# Patient Record
Sex: MALE | Race: BLACK OR AFRICAN AMERICAN | NOT HISPANIC OR LATINO | URBAN - METROPOLITAN AREA
[De-identification: names, ages, dates, MRNs, and addresses within clinical notes are randomized per-mention and may not be internally consistent; named-entity substitution may affect disease eponyms.]

---

## 2017-05-14 ENCOUNTER — EMERGENCY (EMERGENCY)
Facility: HOSPITAL | Age: 62
LOS: 1 days | Discharge: HOME | End: 2017-05-14
Attending: EMERGENCY MEDICINE | Admitting: EMERGENCY MEDICINE
Payer: MEDICARE

## 2017-05-14 VITALS
DIASTOLIC BLOOD PRESSURE: 76 MMHG | HEART RATE: 93 BPM | TEMPERATURE: 98 F | RESPIRATION RATE: 20 BRPM | OXYGEN SATURATION: 98 % | SYSTOLIC BLOOD PRESSURE: 147 MMHG | WEIGHT: 160.06 LBS

## 2017-05-14 VITALS
HEART RATE: 77 BPM | RESPIRATION RATE: 18 BRPM | SYSTOLIC BLOOD PRESSURE: 129 MMHG | DIASTOLIC BLOOD PRESSURE: 83 MMHG | TEMPERATURE: 98 F | OXYGEN SATURATION: 95 %

## 2017-05-14 DIAGNOSIS — F17.210 NICOTINE DEPENDENCE, CIGARETTES, UNCOMPLICATED: ICD-10-CM

## 2017-05-14 DIAGNOSIS — R53.1 WEAKNESS: ICD-10-CM

## 2017-05-14 DIAGNOSIS — J18.9 PNEUMONIA, UNSPECIFIED ORGANISM: ICD-10-CM

## 2017-05-14 DIAGNOSIS — Z87.898 PERSONAL HISTORY OF OTHER SPECIFIED CONDITIONS: Chronic | ICD-10-CM

## 2017-05-14 DIAGNOSIS — Z79.1 LONG TERM (CURRENT) USE OF NON-STEROIDAL ANTI-INFLAMMATORIES (NSAID): ICD-10-CM

## 2017-05-14 DIAGNOSIS — Z79.2 LONG TERM (CURRENT) USE OF ANTIBIOTICS: ICD-10-CM

## 2017-05-14 DIAGNOSIS — Z79.899 OTHER LONG TERM (CURRENT) DRUG THERAPY: ICD-10-CM

## 2017-05-14 LAB
ALBUMIN SERPL ELPH-MCNC: 3.1 G/DL — LOW (ref 3.4–5)
ALP SERPL-CCNC: 88 U/L — SIGNIFICANT CHANGE UP (ref 40–120)
ALT FLD-CCNC: 22 U/L — SIGNIFICANT CHANGE UP (ref 12–42)
ANION GAP SERPL CALC-SCNC: 5 MMOL/L — LOW (ref 9–16)
AST SERPL-CCNC: 16 U/L — SIGNIFICANT CHANGE UP (ref 15–37)
BASOPHILS NFR BLD AUTO: 0.2 % — SIGNIFICANT CHANGE UP (ref 0–2)
BILIRUB SERPL-MCNC: 0.5 MG/DL — SIGNIFICANT CHANGE UP (ref 0.2–1.2)
BUN SERPL-MCNC: 9 MG/DL — SIGNIFICANT CHANGE UP (ref 7–23)
CALCIUM SERPL-MCNC: 9.2 MG/DL — SIGNIFICANT CHANGE UP (ref 8.5–10.5)
CHLORIDE SERPL-SCNC: 104 MMOL/L — SIGNIFICANT CHANGE UP (ref 96–108)
CO2 SERPL-SCNC: 30 MMOL/L — SIGNIFICANT CHANGE UP (ref 22–31)
CREAT SERPL-MCNC: 0.84 MG/DL — SIGNIFICANT CHANGE UP (ref 0.5–1.3)
EOSINOPHIL NFR BLD AUTO: 1.3 % — SIGNIFICANT CHANGE UP (ref 0–6)
GLUCOSE SERPL-MCNC: 115 MG/DL — HIGH (ref 70–99)
HCT VFR BLD CALC: 44.3 % — SIGNIFICANT CHANGE UP (ref 39–50)
HGB BLD-MCNC: 14.9 G/DL — SIGNIFICANT CHANGE UP (ref 13–17)
LYMPHOCYTES # BLD AUTO: 29.6 % — SIGNIFICANT CHANGE UP (ref 13–44)
MCHC RBC-ENTMCNC: 29.7 PG — SIGNIFICANT CHANGE UP (ref 27–34)
MCHC RBC-ENTMCNC: 33.6 G/DL — SIGNIFICANT CHANGE UP (ref 32–36)
MCV RBC AUTO: 88.4 FL — SIGNIFICANT CHANGE UP (ref 80–100)
MONOCYTES NFR BLD AUTO: 7 % — SIGNIFICANT CHANGE UP (ref 2–14)
NEUTROPHILS NFR BLD AUTO: 61.9 % — SIGNIFICANT CHANGE UP (ref 43–77)
PLATELET # BLD AUTO: 185 K/UL — SIGNIFICANT CHANGE UP (ref 150–400)
POTASSIUM SERPL-MCNC: 4.6 MMOL/L — SIGNIFICANT CHANGE UP (ref 3.5–5.3)
POTASSIUM SERPL-SCNC: 4.6 MMOL/L — SIGNIFICANT CHANGE UP (ref 3.5–5.3)
PROT SERPL-MCNC: 7.7 G/DL — SIGNIFICANT CHANGE UP (ref 6.4–8.2)
RBC # BLD: 5.01 M/UL — SIGNIFICANT CHANGE UP (ref 4.2–5.8)
RBC # FLD: 13.6 % — SIGNIFICANT CHANGE UP (ref 10.3–16.9)
SODIUM SERPL-SCNC: 139 MMOL/L — SIGNIFICANT CHANGE UP (ref 135–145)
WBC # BLD: 8.3 K/UL — SIGNIFICANT CHANGE UP (ref 3.8–10.5)
WBC # FLD AUTO: 8.3 K/UL — SIGNIFICANT CHANGE UP (ref 3.8–10.5)

## 2017-05-14 PROCEDURE — 85025 COMPLETE CBC W/AUTO DIFF WBC: CPT

## 2017-05-14 PROCEDURE — 96374 THER/PROPH/DIAG INJ IV PUSH: CPT

## 2017-05-14 PROCEDURE — 99284 EMERGENCY DEPT VISIT MOD MDM: CPT | Mod: 25

## 2017-05-14 PROCEDURE — 93010 ELECTROCARDIOGRAM REPORT: CPT

## 2017-05-14 PROCEDURE — 36415 COLL VENOUS BLD VENIPUNCTURE: CPT

## 2017-05-14 PROCEDURE — 71020: CPT | Mod: 26

## 2017-05-14 PROCEDURE — 71046 X-RAY EXAM CHEST 2 VIEWS: CPT

## 2017-05-14 PROCEDURE — 80053 COMPREHEN METABOLIC PANEL: CPT

## 2017-05-14 PROCEDURE — 93005 ELECTROCARDIOGRAM TRACING: CPT

## 2017-05-14 RX ORDER — SODIUM CHLORIDE 9 MG/ML
1000 INJECTION INTRAMUSCULAR; INTRAVENOUS; SUBCUTANEOUS ONCE
Qty: 0 | Refills: 0 | Status: COMPLETED | OUTPATIENT
Start: 2017-05-14 | End: 2017-05-14

## 2017-05-14 RX ADMIN — SODIUM CHLORIDE 2000 MILLILITER(S): 9 INJECTION INTRAMUSCULAR; INTRAVENOUS; SUBCUTANEOUS at 08:19

## 2017-05-14 NOTE — ED PROVIDER NOTE - ATTENDING CONTRIBUTION TO CARE
62 yo healthy M with cough x 1 week.  No cp, sob.  VSS.  Pt well on exam, nonlabored, faint rare crackle, no LE edema.  SY noted by PA ? interstitial infiltrates.  Possible CAP.  Plan abx and outpt fu.

## 2017-05-14 NOTE — ED PROVIDER NOTE - NS ED ROS FT
CONSTITUTIONAL: chills, fatigue per HPI  NEURO: No headache, no dizziness, no syncope; No weakness/tingling/numbness  EYES: No visual changes  ENT: No  sore throat, mild rhinorrhea  PULM: No dyspnea or hemoptysis  CV: No chest pain or palpitations  GI: No abdominal pain, vomiting, or diarrhea  : No dysuria, hematuria, frequency  MSK: No neck pain or back pain, no joint pain; no LE swelling or calf pain.  SKIN: no rash

## 2017-05-14 NOTE — ED PROVIDER NOTE - PHYSICAL EXAMINATION
CONSTITUTIONAL: WD,WN. NAD.    SKIN: Normal color and turgor. No rash.    EYES: Conjunctiva clear. EOMI. PERRL.    ENT: Airway patent, OP clear. Nasal mucosa clear, no rhinorrhea.   RESPIRATORY:  Breathing non-labored. No retractions, accessory muscle use.  Lungs with coarse breath sounds.  CARDIOVASCULAR:  RRR, S1S2. No M/R/G.      GI:   Bowel sounds present. Abdomen soft, nontender.    MSK: No joint swelling.  No neck or back tenderness. Painless FROM.  No lower extremity edema or calf tenderness.    NEURO: Alert and oriented; PRAKASH without gross abnormalities.  Normal speech and gait. CONSTITUTIONAL: WD,WN. NAD.    SKIN: Normal color and turgor. No rash.    EYES: Conjunctiva clear. EOMI. PERRL.    ENT: Airway patent, OP clear. Nasal mucosa clear, no rhinorrhea.   RESPIRATORY:  Breathing non-labored. No retractions, accessory muscle use.  Lungs with few scattered crackles.  CARDIOVASCULAR:  RRR, S1S2. No M/R/G.      GI:   Bowel sounds present. Abdomen soft, nontender.    MSK: No joint swelling.  No neck or back tenderness. Painless FROM.  No lower extremity edema or calf tenderness.    NEURO: Alert and oriented; PRAKASH without gross abnormalities.  Normal speech and gait.

## 2017-05-14 NOTE — ED PROVIDER NOTE - OBJECTIVE STATEMENT
pt with hx of substance abuse on MMTP c/o cough x 1 week.  producing large amount yellowish sputum. does not feel short of breath, but feels slightly fatigued. feels cold & some chills.  no syncope.  no chest pain. vomited once 2 days ago.  no hematemasis or bile.  no abd pain, diarrhea, BRBPR or melena.

## 2017-05-14 NOTE — ED PROVIDER NOTE - RADIOLOGY FINDINGS
suspect interstitial infiltrates, air bronchograms left side.  no ptx.  no effusion.  normal heart size.

## 2017-05-14 NOTE — ED PROVIDER NOTE - MEDICAL DECISION MAKING DETAILS
61m with cough and fatigue.  Cough fairly severe, but otherwise nontoxic appearing.  Check labs and CXR. 61m with cough.  Cough fairly severe with yellow sputum, but otherwise nontoxic appearing.  AVSS, good oxygen saturation.  No signs of CHF.  Check labs and CXR.

## 2017-06-14 ENCOUNTER — EMERGENCY (EMERGENCY)
Facility: HOSPITAL | Age: 62
LOS: 1 days | Discharge: LEFT BEFORE DISCHARGE | End: 2017-06-14
Attending: EMERGENCY MEDICINE | Admitting: EMERGENCY MEDICINE

## 2017-06-14 VITALS
SYSTOLIC BLOOD PRESSURE: 95 MMHG | WEIGHT: 160.06 LBS | HEART RATE: 81 BPM | DIASTOLIC BLOOD PRESSURE: 70 MMHG | TEMPERATURE: 97 F

## 2017-06-14 DIAGNOSIS — M79.672 PAIN IN LEFT FOOT: ICD-10-CM

## 2017-06-14 DIAGNOSIS — Z87.898 PERSONAL HISTORY OF OTHER SPECIFIED CONDITIONS: Chronic | ICD-10-CM

## 2017-06-14 DIAGNOSIS — M79.671 PAIN IN RIGHT FOOT: ICD-10-CM

## 2017-06-14 NOTE — ED ADULT TRIAGE NOTE - CHIEF COMPLAINT QUOTE
bilateral foot pain x 4 years.  "I have bad bunions and calluses."  Denies recent falls / injury.  No deformity / wounds noted.  Pulses palpable.

## 2017-06-14 NOTE — ED ADULT NURSE NOTE - OBJECTIVE STATEMENT
61 yr old male patient with c/o of bilateral foot pain [id number].  A+Ox3, ambulatory w steady gait, denies recent trauma.

## 2017-06-15 NOTE — ED PROVIDER NOTE - OBJECTIVE STATEMENT
I was not able to locate pt in the treatment area and had not seen him personally. pt was not seen by midlevel provider , chaim ferrara

## 2018-01-25 ENCOUNTER — EMERGENCY (EMERGENCY)
Facility: HOSPITAL | Age: 63
LOS: 1 days | Discharge: HOME | End: 2018-01-25
Attending: EMERGENCY MEDICINE | Admitting: EMERGENCY MEDICINE
Payer: MEDICARE

## 2018-01-25 VITALS
SYSTOLIC BLOOD PRESSURE: 145 MMHG | HEART RATE: 55 BPM | WEIGHT: 163.58 LBS | TEMPERATURE: 97 F | DIASTOLIC BLOOD PRESSURE: 78 MMHG | OXYGEN SATURATION: 96 % | RESPIRATION RATE: 18 BRPM | HEIGHT: 71 IN

## 2018-01-25 DIAGNOSIS — Z79.1 LONG TERM (CURRENT) USE OF NON-STEROIDAL ANTI-INFLAMMATORIES (NSAID): ICD-10-CM

## 2018-01-25 DIAGNOSIS — Z79.891 LONG TERM (CURRENT) USE OF OPIATE ANALGESIC: ICD-10-CM

## 2018-01-25 DIAGNOSIS — B34.9 VIRAL INFECTION, UNSPECIFIED: ICD-10-CM

## 2018-01-25 DIAGNOSIS — Z72.0 TOBACCO USE: ICD-10-CM

## 2018-01-25 DIAGNOSIS — Z87.898 PERSONAL HISTORY OF OTHER SPECIFIED CONDITIONS: Chronic | ICD-10-CM

## 2018-01-25 DIAGNOSIS — Z79.899 OTHER LONG TERM (CURRENT) DRUG THERAPY: ICD-10-CM

## 2018-01-25 LAB — RAPID RVP RESULT: SIGNIFICANT CHANGE UP

## 2018-01-25 PROCEDURE — 71046 X-RAY EXAM CHEST 2 VIEWS: CPT

## 2018-01-25 PROCEDURE — 87581 M.PNEUMON DNA AMP PROBE: CPT

## 2018-01-25 PROCEDURE — 87486 CHLMYD PNEUM DNA AMP PROBE: CPT

## 2018-01-25 PROCEDURE — 99284 EMERGENCY DEPT VISIT MOD MDM: CPT

## 2018-01-25 PROCEDURE — 87798 DETECT AGENT NOS DNA AMP: CPT

## 2018-01-25 PROCEDURE — 71046 X-RAY EXAM CHEST 2 VIEWS: CPT | Mod: 26

## 2018-01-25 PROCEDURE — 99283 EMERGENCY DEPT VISIT LOW MDM: CPT | Mod: 25

## 2018-01-25 PROCEDURE — 87633 RESP VIRUS 12-25 TARGETS: CPT

## 2018-01-25 RX ORDER — CETIRIZINE HYDROCHLORIDE 10 MG/1
1 TABLET ORAL
Qty: 7 | Refills: 0 | OUTPATIENT
Start: 2018-01-25 | End: 2018-01-31

## 2018-01-25 NOTE — ED PROVIDER NOTE - MEDICAL DECISION MAKING DETAILS
Patient with viral symptoms, afebrile with not significant PE. CXr appear neg for infiltrate. Recommend supportive care and f/u with PMD.

## 2018-01-25 NOTE — ED ADULT NURSE NOTE - OBJECTIVE STATEMENT
pt presents to ED c/o cough. Pt states. " I have not been feeling well for the past couple of days and now since I missed my methadone program I feel worse" Upon assessment, pt has dishevel appearance and overall poor hygiene. Pt AAO x 3 speech is clear and coherent breathing even and unlabored.

## 2018-01-25 NOTE — ED PROVIDER NOTE - OBJECTIVE STATEMENT
63 y/o m with c/o one week dry cough, nasal congestion,sore throat, fatigue. Denies fever, sob, chest pain, n, v, abd pain, ear pain. No flu vaccine. He denies h/o HTN, DM ,asthma, + smoker.

## 2018-05-14 ENCOUNTER — EMERGENCY (EMERGENCY)
Facility: HOSPITAL | Age: 63
LOS: 1 days | Discharge: HOME | End: 2018-05-14
Attending: EMERGENCY MEDICINE | Admitting: EMERGENCY MEDICINE
Payer: MEDICARE

## 2018-05-14 VITALS
HEART RATE: 93 BPM | OXYGEN SATURATION: 100 % | RESPIRATION RATE: 18 BRPM | TEMPERATURE: 98 F | DIASTOLIC BLOOD PRESSURE: 71 MMHG | SYSTOLIC BLOOD PRESSURE: 122 MMHG

## 2018-05-14 VITALS
RESPIRATION RATE: 16 BRPM | DIASTOLIC BLOOD PRESSURE: 72 MMHG | TEMPERATURE: 98 F | HEART RATE: 64 BPM | OXYGEN SATURATION: 99 % | SYSTOLIC BLOOD PRESSURE: 134 MMHG

## 2018-05-14 DIAGNOSIS — Z79.891 LONG TERM (CURRENT) USE OF OPIATE ANALGESIC: ICD-10-CM

## 2018-05-14 DIAGNOSIS — Z79.899 OTHER LONG TERM (CURRENT) DRUG THERAPY: ICD-10-CM

## 2018-05-14 DIAGNOSIS — Z87.898 PERSONAL HISTORY OF OTHER SPECIFIED CONDITIONS: Chronic | ICD-10-CM

## 2018-05-14 DIAGNOSIS — Z79.1 LONG TERM (CURRENT) USE OF NON-STEROIDAL ANTI-INFLAMMATORIES (NSAID): ICD-10-CM

## 2018-05-14 DIAGNOSIS — F11.23 OPIOID DEPENDENCE WITH WITHDRAWAL: ICD-10-CM

## 2018-05-14 DIAGNOSIS — R53.1 WEAKNESS: ICD-10-CM

## 2018-05-14 DIAGNOSIS — Z79.2 LONG TERM (CURRENT) USE OF ANTIBIOTICS: ICD-10-CM

## 2018-05-14 PROCEDURE — 99284 EMERGENCY DEPT VISIT MOD MDM: CPT | Mod: 25

## 2018-05-14 PROCEDURE — 96375 TX/PRO/DX INJ NEW DRUG ADDON: CPT

## 2018-05-14 PROCEDURE — 82962 GLUCOSE BLOOD TEST: CPT

## 2018-05-14 PROCEDURE — 96374 THER/PROPH/DIAG INJ IV PUSH: CPT

## 2018-05-14 PROCEDURE — 93010 ELECTROCARDIOGRAM REPORT: CPT

## 2018-05-14 PROCEDURE — 93005 ELECTROCARDIOGRAM TRACING: CPT

## 2018-05-14 RX ORDER — SODIUM CHLORIDE 9 MG/ML
1000 INJECTION INTRAMUSCULAR; INTRAVENOUS; SUBCUTANEOUS ONCE
Qty: 0 | Refills: 0 | Status: COMPLETED | OUTPATIENT
Start: 2018-05-14 | End: 2018-05-14

## 2018-05-14 RX ORDER — ONDANSETRON 8 MG/1
4 TABLET, FILM COATED ORAL ONCE
Qty: 0 | Refills: 0 | Status: COMPLETED | OUTPATIENT
Start: 2018-05-14 | End: 2018-05-14

## 2018-05-14 RX ORDER — METOCLOPRAMIDE HCL 10 MG
10 TABLET ORAL ONCE
Qty: 0 | Refills: 0 | Status: COMPLETED | OUTPATIENT
Start: 2018-05-14 | End: 2018-05-14

## 2018-05-14 RX ADMIN — ONDANSETRON 4 MILLIGRAM(S): 8 TABLET, FILM COATED ORAL at 11:16

## 2018-05-14 RX ADMIN — Medication 10 MILLIGRAM(S): at 12:37

## 2018-05-14 RX ADMIN — SODIUM CHLORIDE 2000 MILLILITER(S): 9 INJECTION INTRAMUSCULAR; INTRAVENOUS; SUBCUTANEOUS at 11:16

## 2018-05-14 RX ADMIN — Medication 0.2 MILLIGRAM(S): at 10:21

## 2018-05-14 RX ADMIN — SODIUM CHLORIDE 2000 MILLILITER(S): 9 INJECTION INTRAMUSCULAR; INTRAVENOUS; SUBCUTANEOUS at 13:37

## 2018-05-14 RX ADMIN — ONDANSETRON 4 MILLIGRAM(S): 8 TABLET, FILM COATED ORAL at 10:21

## 2018-05-14 NOTE — ED ADULT NURSE NOTE - CHPI ED SYMPTOMS NEG
no chills/no disorientation/no pain/no fever/no abdominal distension/no vomiting/no abdominal pain/no confusion

## 2018-05-14 NOTE — ED PROVIDER NOTE - OBJECTIVE STATEMENT
63yo M hx of polysubstance abuse, here w/ complaint of nausea and feeling tremulous in setting of recently going off methadone and using heroin for a week until 2 days ago. +vomiting. No abdominal pain. Feels shaky. Denies etoh abuse or other drugs, states just heroin.

## 2018-05-14 NOTE — ED PROVIDER NOTE - MEDICAL DECISION MAKING DETAILS
here w/ acute opiate withdrawal and n/v and dehdyration. felt much better after fluids and anti emetics. DC home in NAD with strict return precautions given. to follow up with his methadone program and stop doing heroin

## 2018-05-14 NOTE — ED ADULT NURSE NOTE - OBJECTIVE STATEMENT
63 y/o male w/ hx of IV drug use currently in methadone program c/o weakness, diarrhea and constipation since yesterday, headache after missing methadone program x1 week and taking heroine 3 days ago. Patient A+Ox3, ambulating with steady gait.

## 2018-09-19 ENCOUNTER — EMERGENCY (EMERGENCY)
Facility: HOSPITAL | Age: 63
LOS: 1 days | Discharge: HOME | End: 2018-09-19
Admitting: EMERGENCY MEDICINE
Payer: MEDICARE

## 2018-09-19 VITALS
DIASTOLIC BLOOD PRESSURE: 77 MMHG | TEMPERATURE: 98 F | HEIGHT: 71 IN | SYSTOLIC BLOOD PRESSURE: 111 MMHG | WEIGHT: 158.07 LBS | OXYGEN SATURATION: 97 % | HEART RATE: 62 BPM | RESPIRATION RATE: 18 BRPM

## 2018-09-19 DIAGNOSIS — M79.671 PAIN IN RIGHT FOOT: ICD-10-CM

## 2018-09-19 DIAGNOSIS — Z87.898 PERSONAL HISTORY OF OTHER SPECIFIED CONDITIONS: Chronic | ICD-10-CM

## 2018-09-19 DIAGNOSIS — L84 CORNS AND CALLOSITIES: ICD-10-CM

## 2018-09-19 DIAGNOSIS — Z79.899 OTHER LONG TERM (CURRENT) DRUG THERAPY: ICD-10-CM

## 2018-09-19 PROCEDURE — 99282 EMERGENCY DEPT VISIT SF MDM: CPT

## 2018-09-19 NOTE — ED PROVIDER NOTE - OBJECTIVE STATEMENT
63 y/o male with right foot pain x1 week. Pt reports he has dead skin on the bottom of his foot and it keeps flaking. Pt reports pain is located under his toe and he feels a rough dead skin that feels like he is walking on a rock. He denies the following: fever, chills, injury, numbness/tingling, swelling, redness. Pt denies seeing podiatry.

## 2018-09-19 NOTE — ED ADULT NURSE NOTE - CHPI ED NUR SYMPTOMS NEG
no tingling/no decreased eating/drinking/no nausea/no weakness/no chills/no dizziness/no vomiting/no fever

## 2018-09-19 NOTE — ED ADULT NURSE NOTE - OBJECTIVE STATEMENT
62 year old male patient with c/o of R foot pain x1week, c/o of "the skin is peeling on the bottom of my foot, it's all flaky".  No distress noted.  Denies n/v/d/f, denies trauma to area.

## 2018-09-19 NOTE — ED PROVIDER NOTE - MEDICAL DECISION MAKING DETAILS
61 y/o male with right toe pain due to callus located on bottom of foot. Pt asked for blade to cut it off. Pt was advised risk of infection may occur and can use a scrubbing pad to slowly remove the callus or fu with podiatry.

## 2018-09-19 NOTE — ED ADULT NURSE NOTE - NSIMPLEMENTINTERV_GEN_ALL_ED
Implemented All Universal Safety Interventions:  [place] to call system. Call bell, personal items and telephone within reach. Instruct patient to call for assistance. Room bathroom lighting operational. Non-slip footwear when patient is off stretcher. Physically safe environment: no spills, clutter or unnecessary equipment. Stretcher in lowest position, wheels locked, appropriate side rails in place.

## 2018-11-27 NOTE — ED ADULT NURSE NOTE - CAS TRG GENERAL NORM CIRC DET
5D: Impaired Motor Function and Sensory Integrity Associated with Nonprogressive Disorders of the Central Nervous System—Acquired in Adolescence or Adulthood
Strong peripheral pulses

## 2018-12-11 ENCOUNTER — EMERGENCY (EMERGENCY)
Facility: HOSPITAL | Age: 63
LOS: 1 days | Discharge: HOME | End: 2018-12-11
Attending: EMERGENCY MEDICINE | Admitting: EMERGENCY MEDICINE
Payer: MEDICARE

## 2018-12-11 VITALS
HEART RATE: 84 BPM | OXYGEN SATURATION: 98 % | SYSTOLIC BLOOD PRESSURE: 138 MMHG | TEMPERATURE: 98 F | RESPIRATION RATE: 18 BRPM | WEIGHT: 172.18 LBS | DIASTOLIC BLOOD PRESSURE: 81 MMHG

## 2018-12-11 VITALS
RESPIRATION RATE: 16 BRPM | HEART RATE: 72 BPM | TEMPERATURE: 98 F | SYSTOLIC BLOOD PRESSURE: 132 MMHG | DIASTOLIC BLOOD PRESSURE: 69 MMHG | OXYGEN SATURATION: 99 %

## 2018-12-11 DIAGNOSIS — Z87.898 PERSONAL HISTORY OF OTHER SPECIFIED CONDITIONS: Chronic | ICD-10-CM

## 2018-12-11 LAB
ALBUMIN SERPL ELPH-MCNC: 3.8 G/DL — SIGNIFICANT CHANGE UP (ref 3.3–5)
ALP SERPL-CCNC: 91 U/L — SIGNIFICANT CHANGE UP (ref 40–120)
ALT FLD-CCNC: 17 U/L — SIGNIFICANT CHANGE UP (ref 10–45)
ANION GAP SERPL CALC-SCNC: 8 MMOL/L — SIGNIFICANT CHANGE UP (ref 5–17)
APPEARANCE UR: CLEAR — SIGNIFICANT CHANGE UP
APTT BLD: 31.6 SEC — SIGNIFICANT CHANGE UP (ref 27.5–36.3)
AST SERPL-CCNC: 21 U/L — SIGNIFICANT CHANGE UP (ref 10–40)
BASOPHILS NFR BLD AUTO: 0.2 % — SIGNIFICANT CHANGE UP (ref 0–2)
BILIRUB SERPL-MCNC: 0.5 MG/DL — SIGNIFICANT CHANGE UP (ref 0.2–1.2)
BILIRUB UR-MCNC: NEGATIVE — SIGNIFICANT CHANGE UP
BUN SERPL-MCNC: 16 MG/DL — SIGNIFICANT CHANGE UP (ref 7–23)
CALCIUM SERPL-MCNC: 9.8 MG/DL — SIGNIFICANT CHANGE UP (ref 8.4–10.5)
CHLORIDE SERPL-SCNC: 106 MMOL/L — SIGNIFICANT CHANGE UP (ref 96–108)
CO2 SERPL-SCNC: 27 MMOL/L — SIGNIFICANT CHANGE UP (ref 22–31)
COLOR SPEC: YELLOW — SIGNIFICANT CHANGE UP
CREAT SERPL-MCNC: 0.97 MG/DL — SIGNIFICANT CHANGE UP (ref 0.5–1.3)
DIFF PNL FLD: NEGATIVE — SIGNIFICANT CHANGE UP
EOSINOPHIL NFR BLD AUTO: 1.1 % — SIGNIFICANT CHANGE UP (ref 0–6)
GLUCOSE SERPL-MCNC: 116 MG/DL — HIGH (ref 70–99)
GLUCOSE UR QL: NEGATIVE — SIGNIFICANT CHANGE UP
HCT VFR BLD CALC: 46.3 % — SIGNIFICANT CHANGE UP (ref 39–50)
HGB BLD-MCNC: 15.2 G/DL — SIGNIFICANT CHANGE UP (ref 13–17)
INR BLD: 1.06 — SIGNIFICANT CHANGE UP (ref 0.88–1.16)
KETONES UR-MCNC: NEGATIVE — SIGNIFICANT CHANGE UP
LACTATE SERPL-SCNC: 1.3 MMOL/L — SIGNIFICANT CHANGE UP (ref 0.5–2)
LEUKOCYTE ESTERASE UR-ACNC: NEGATIVE — SIGNIFICANT CHANGE UP
LIDOCAIN IGE QN: 26 U/L — SIGNIFICANT CHANGE UP (ref 7–60)
LYMPHOCYTES # BLD AUTO: 29.9 % — SIGNIFICANT CHANGE UP (ref 13–44)
MAGNESIUM SERPL-MCNC: 2 MG/DL — SIGNIFICANT CHANGE UP (ref 1.6–2.6)
MCHC RBC-ENTMCNC: 30.2 PG — SIGNIFICANT CHANGE UP (ref 27–34)
MCHC RBC-ENTMCNC: 32.8 G/DL — SIGNIFICANT CHANGE UP (ref 32–36)
MCV RBC AUTO: 92 FL — SIGNIFICANT CHANGE UP (ref 80–100)
MONOCYTES NFR BLD AUTO: 9.5 % — SIGNIFICANT CHANGE UP (ref 2–14)
NEUTROPHILS NFR BLD AUTO: 59.3 % — SIGNIFICANT CHANGE UP (ref 43–77)
NITRITE UR-MCNC: NEGATIVE — SIGNIFICANT CHANGE UP
PH UR: 6.5 — SIGNIFICANT CHANGE UP (ref 5–8)
PLATELET # BLD AUTO: 156 K/UL — SIGNIFICANT CHANGE UP (ref 150–400)
POTASSIUM SERPL-MCNC: 4.4 MMOL/L — SIGNIFICANT CHANGE UP (ref 3.5–5.3)
POTASSIUM SERPL-SCNC: 4.4 MMOL/L — SIGNIFICANT CHANGE UP (ref 3.5–5.3)
PROT SERPL-MCNC: 6.7 G/DL — SIGNIFICANT CHANGE UP (ref 6–8.3)
PROT UR-MCNC: NEGATIVE MG/DL — SIGNIFICANT CHANGE UP
PROTHROM AB SERPL-ACNC: 12 SEC — SIGNIFICANT CHANGE UP (ref 10–12.9)
RBC # BLD: 5.03 M/UL — SIGNIFICANT CHANGE UP (ref 4.2–5.8)
RBC # FLD: 14.5 % — SIGNIFICANT CHANGE UP (ref 10.3–16.9)
SODIUM SERPL-SCNC: 141 MMOL/L — SIGNIFICANT CHANGE UP (ref 135–145)
SP GR SPEC: 1.02 — SIGNIFICANT CHANGE UP (ref 1–1.03)
UROBILINOGEN FLD QL: 2 E.U./DL
WBC # BLD: 6.2 K/UL — SIGNIFICANT CHANGE UP (ref 3.8–10.5)
WBC # FLD AUTO: 6.2 K/UL — SIGNIFICANT CHANGE UP (ref 3.8–10.5)

## 2018-12-11 PROCEDURE — 85610 PROTHROMBIN TIME: CPT

## 2018-12-11 PROCEDURE — 83605 ASSAY OF LACTIC ACID: CPT

## 2018-12-11 PROCEDURE — 80053 COMPREHEN METABOLIC PANEL: CPT

## 2018-12-11 PROCEDURE — 83735 ASSAY OF MAGNESIUM: CPT

## 2018-12-11 PROCEDURE — 85025 COMPLETE CBC W/AUTO DIFF WBC: CPT

## 2018-12-11 PROCEDURE — 99285 EMERGENCY DEPT VISIT HI MDM: CPT | Mod: 25

## 2018-12-11 PROCEDURE — 36415 COLL VENOUS BLD VENIPUNCTURE: CPT

## 2018-12-11 PROCEDURE — 83690 ASSAY OF LIPASE: CPT

## 2018-12-11 PROCEDURE — 74177 CT ABD & PELVIS W/CONTRAST: CPT | Mod: 26

## 2018-12-11 PROCEDURE — 85730 THROMBOPLASTIN TIME PARTIAL: CPT

## 2018-12-11 PROCEDURE — 96375 TX/PRO/DX INJ NEW DRUG ADDON: CPT

## 2018-12-11 PROCEDURE — 74177 CT ABD & PELVIS W/CONTRAST: CPT

## 2018-12-11 PROCEDURE — 96374 THER/PROPH/DIAG INJ IV PUSH: CPT | Mod: XU

## 2018-12-11 PROCEDURE — 81003 URINALYSIS AUTO W/O SCOPE: CPT

## 2018-12-11 PROCEDURE — 96361 HYDRATE IV INFUSION ADD-ON: CPT

## 2018-12-11 PROCEDURE — 99284 EMERGENCY DEPT VISIT MOD MDM: CPT | Mod: 25

## 2018-12-11 RX ORDER — SODIUM CHLORIDE 9 MG/ML
1000 INJECTION INTRAMUSCULAR; INTRAVENOUS; SUBCUTANEOUS ONCE
Qty: 0 | Refills: 0 | Status: COMPLETED | OUTPATIENT
Start: 2018-12-11 | End: 2018-12-11

## 2018-12-11 RX ORDER — MORPHINE SULFATE 50 MG/1
2 CAPSULE, EXTENDED RELEASE ORAL ONCE
Qty: 0 | Refills: 0 | Status: DISCONTINUED | OUTPATIENT
Start: 2018-12-11 | End: 2018-12-11

## 2018-12-11 RX ORDER — IOHEXOL 300 MG/ML
30 INJECTION, SOLUTION INTRAVENOUS ONCE
Qty: 0 | Refills: 0 | Status: COMPLETED | OUTPATIENT
Start: 2018-12-11 | End: 2018-12-11

## 2018-12-11 RX ORDER — ONDANSETRON 8 MG/1
4 TABLET, FILM COATED ORAL ONCE
Qty: 0 | Refills: 0 | Status: COMPLETED | OUTPATIENT
Start: 2018-12-11 | End: 2018-12-11

## 2018-12-11 RX ORDER — KETOROLAC TROMETHAMINE 30 MG/ML
15 SYRINGE (ML) INJECTION ONCE
Qty: 0 | Refills: 0 | Status: DISCONTINUED | OUTPATIENT
Start: 2018-12-11 | End: 2018-12-11

## 2018-12-11 RX ADMIN — SODIUM CHLORIDE 1000 MILLILITER(S): 9 INJECTION INTRAMUSCULAR; INTRAVENOUS; SUBCUTANEOUS at 02:26

## 2018-12-11 RX ADMIN — Medication 15 MILLIGRAM(S): at 02:31

## 2018-12-11 RX ADMIN — MORPHINE SULFATE 2 MILLIGRAM(S): 50 CAPSULE, EXTENDED RELEASE ORAL at 02:31

## 2018-12-11 RX ADMIN — SODIUM CHLORIDE 1000 MILLILITER(S): 9 INJECTION INTRAMUSCULAR; INTRAVENOUS; SUBCUTANEOUS at 03:21

## 2018-12-11 RX ADMIN — ONDANSETRON 4 MILLIGRAM(S): 8 TABLET, FILM COATED ORAL at 02:31

## 2018-12-11 RX ADMIN — MORPHINE SULFATE 2 MILLIGRAM(S): 50 CAPSULE, EXTENDED RELEASE ORAL at 03:21

## 2018-12-11 RX ADMIN — IOHEXOL 30 MILLILITER(S): 300 INJECTION, SOLUTION INTRAVENOUS at 02:35

## 2018-12-11 RX ADMIN — Medication 15 MILLIGRAM(S): at 03:21

## 2018-12-11 NOTE — ED ADULT NURSE REASSESSMENT NOTE - NS ED NURSE REASSESS COMMENT FT1
Patient brought back from CT scan, patient reports that he is hungry. Instructed patient to wait for CT results before oral consumption. Patient verbalized understanding.

## 2018-12-11 NOTE — ED PROVIDER NOTE - PHYSICAL EXAMINATION
VITAL SIGNS: I have reviewed nursing notes and confirm.  CONSTITUTIONAL: Well-developed, disheveled man lying in stretcher speaking clearly in complete sentences, following commands appropriately; in no acute distress.  SKIN: Agree with RN documentation regarding decubitus evaluation. Remainder of skin exam is warm and dry, no acute rash.  HEAD: Normocephalic; atraumatic.  EYES: PERRL, EOM intact; conjunctiva and sclera clear.  ENT: No nasal discharge; airway clear.  NECK: Supple; non tender.  CARD: S1, S2 normal; no murmurs, gallops, or rubs. RRR  RESP: No wheezes, rales or rhonchi. CTA w/good excursion, no inc wob  ABD: Normal bowel sounds; soft; ND, + large L inguinal hernia TTP and hard, non-reducible  : No testicular TTP, on penile discharge or lesions  EXT: Normal ROM. No clubbing, cyanosis or edema.  LYMPH: No acute cervical adenopathy.  NEURO: Alert, oriented. Grossly unremarkable.  PSYCH: Cooperative, appropriate.

## 2018-12-11 NOTE — ED ADULT NURSE REASSESSMENT NOTE - NS ED NURSE REASSESS COMMENT FT1
Patient reports that he is hungry. Patient evaluated by NELLIE Mazariegos at bedside. Patient cleared by PA for oral consumption. Patient given a sandwich.

## 2018-12-11 NOTE — ED ADULT NURSE NOTE - NSIMPLEMENTINTERV_GEN_ALL_ED
Implemented All Universal Safety Interventions:  [place] to call system. Call bell, personal items and telephone within reach. Instruct patient to call for assistance. Room bathroom lighting operational. Non-slip footwear when patient is off stretcher. Physically safe environment: no spills, clutter or unnecessary equipment. Stretcher in lowest position, wheels locked, appropriate side rails in place.

## 2018-12-11 NOTE — ED PROVIDER NOTE - PROGRESS NOTE DETAILS
Slightly confusing wording in CT report, I called radiology and they confirmed there is no bowel involvement in the hernia.  Pt examined at bedside says he feels better now, "the hernia went down."  Eating a sandwich.  Abdomen soft, no palpable left inguinal mass at this time.

## 2018-12-11 NOTE — ED PROVIDER NOTE - MEDICAL DECISION MAKING DETAILS
+ tender L inguinal hernia, possible incarceration/obstruction, pending labs/imaging studies. HDS and comfortable in stretcher.

## 2018-12-11 NOTE — ED PROVIDER NOTE - OBJECTIVE STATEMENT
64 y/o M, poor historian, maral PMhx, stating that his only history is a brain tumor removal when he was a child, doesn't follow with physicians, states that for an unknown amount of time has had a L sided inguinal hernia, but that it normally comes and goes. For the past 3 days it has been present, hard, and painful. He also reports constipation, stating that he hasn't had a BM in 2 days. "might be" passing gas. Denies n/v, reporting a normal appetite. No fever/chills. Denies urinary sx.

## 2018-12-11 NOTE — ED ADULT NURSE NOTE - OBJECTIVE STATEMENT
Patient reports abdominal pain onset last night. No guarding or tenderness noted. Patient denies fevers, chills, dysuria. Patient reports abdominal pain onset last night. No guarding or  general tenderness noted. Patient reports tenderness to the left lower quadrant upon palpation. Patient denies fevers, chills, dysuria.

## 2018-12-11 NOTE — ED PROVIDER NOTE - CARE PROVIDER_API CALL
Shanelle Stoner), Surgery  [address], [place] [address]  Phone: [phone]  Fax: [phone]

## 2018-12-15 DIAGNOSIS — Z79.2 LONG TERM (CURRENT) USE OF ANTIBIOTICS: ICD-10-CM

## 2018-12-15 DIAGNOSIS — R10.9 UNSPECIFIED ABDOMINAL PAIN: ICD-10-CM

## 2018-12-15 DIAGNOSIS — Z79.1 LONG TERM (CURRENT) USE OF NON-STEROIDAL ANTI-INFLAMMATORIES (NSAID): ICD-10-CM

## 2018-12-15 DIAGNOSIS — K40.90 UNILATERAL INGUINAL HERNIA, WITHOUT OBSTRUCTION OR GANGRENE, NOT SPECIFIED AS RECURRENT: ICD-10-CM

## 2018-12-15 DIAGNOSIS — Z79.899 OTHER LONG TERM (CURRENT) DRUG THERAPY: ICD-10-CM

## 2019-01-27 ENCOUNTER — EMERGENCY (EMERGENCY)
Facility: HOSPITAL | Age: 64
LOS: 1 days | Discharge: HOME | End: 2019-01-27
Attending: EMERGENCY MEDICINE | Admitting: EMERGENCY MEDICINE
Payer: COMMERCIAL

## 2019-01-27 VITALS
WEIGHT: 173.94 LBS | TEMPERATURE: 98 F | OXYGEN SATURATION: 96 % | DIASTOLIC BLOOD PRESSURE: 53 MMHG | SYSTOLIC BLOOD PRESSURE: 126 MMHG | RESPIRATION RATE: 16 BRPM | HEART RATE: 105 BPM

## 2019-01-27 VITALS
OXYGEN SATURATION: 96 % | TEMPERATURE: 98 F | HEART RATE: 86 BPM | RESPIRATION RATE: 16 BRPM | DIASTOLIC BLOOD PRESSURE: 82 MMHG | SYSTOLIC BLOOD PRESSURE: 134 MMHG

## 2019-01-27 DIAGNOSIS — Z87.898 PERSONAL HISTORY OF OTHER SPECIFIED CONDITIONS: Chronic | ICD-10-CM

## 2019-01-27 LAB
ANION GAP SERPL CALC-SCNC: 12 MMOL/L — SIGNIFICANT CHANGE UP (ref 5–17)
BASOPHILS NFR BLD AUTO: 0.5 % — SIGNIFICANT CHANGE UP (ref 0–2)
BUN SERPL-MCNC: 15 MG/DL — SIGNIFICANT CHANGE UP (ref 7–23)
CALCIUM SERPL-MCNC: 9 MG/DL — SIGNIFICANT CHANGE UP (ref 8.4–10.5)
CHLORIDE SERPL-SCNC: 104 MMOL/L — SIGNIFICANT CHANGE UP (ref 96–108)
CO2 SERPL-SCNC: 23 MMOL/L — SIGNIFICANT CHANGE UP (ref 22–31)
CREAT SERPL-MCNC: 0.84 MG/DL — SIGNIFICANT CHANGE UP (ref 0.5–1.3)
EOSINOPHIL NFR BLD AUTO: 1.6 % — SIGNIFICANT CHANGE UP (ref 0–6)
GLUCOSE SERPL-MCNC: 107 MG/DL — HIGH (ref 70–99)
HCT VFR BLD CALC: 46.4 % — SIGNIFICANT CHANGE UP (ref 39–50)
HGB BLD-MCNC: 15.4 G/DL — SIGNIFICANT CHANGE UP (ref 13–17)
LYMPHOCYTES # BLD AUTO: 34.1 % — SIGNIFICANT CHANGE UP (ref 13–44)
MCHC RBC-ENTMCNC: 29.8 PG — SIGNIFICANT CHANGE UP (ref 27–34)
MCHC RBC-ENTMCNC: 33.2 G/DL — SIGNIFICANT CHANGE UP (ref 32–36)
MCV RBC AUTO: 89.9 FL — SIGNIFICANT CHANGE UP (ref 80–100)
MONOCYTES NFR BLD AUTO: 7.5 % — SIGNIFICANT CHANGE UP (ref 2–14)
NEUTROPHILS NFR BLD AUTO: 56.3 % — SIGNIFICANT CHANGE UP (ref 43–77)
PLATELET # BLD AUTO: 216 K/UL — SIGNIFICANT CHANGE UP (ref 150–400)
POTASSIUM SERPL-MCNC: 4.3 MMOL/L — SIGNIFICANT CHANGE UP (ref 3.5–5.3)
POTASSIUM SERPL-SCNC: 4.3 MMOL/L — SIGNIFICANT CHANGE UP (ref 3.5–5.3)
RBC # BLD: 5.16 M/UL — SIGNIFICANT CHANGE UP (ref 4.2–5.8)
RBC # FLD: 13.1 % — SIGNIFICANT CHANGE UP (ref 10.3–16.9)
SODIUM SERPL-SCNC: 139 MMOL/L — SIGNIFICANT CHANGE UP (ref 135–145)
WBC # BLD: 6.4 K/UL — SIGNIFICANT CHANGE UP (ref 3.8–10.5)
WBC # FLD AUTO: 6.4 K/UL — SIGNIFICANT CHANGE UP (ref 3.8–10.5)

## 2019-01-27 PROCEDURE — 85025 COMPLETE CBC W/AUTO DIFF WBC: CPT

## 2019-01-27 PROCEDURE — 80048 BASIC METABOLIC PNL TOTAL CA: CPT

## 2019-01-27 PROCEDURE — 99284 EMERGENCY DEPT VISIT MOD MDM: CPT

## 2019-01-27 PROCEDURE — 99283 EMERGENCY DEPT VISIT LOW MDM: CPT

## 2019-01-27 PROCEDURE — 36415 COLL VENOUS BLD VENIPUNCTURE: CPT

## 2019-01-27 NOTE — ED PROVIDER NOTE - MEDICAL DECISION MAKING DETAILS
Hernia reduced w/out evidence of incarceration or obstruction. Normal BM today, passing gas. Reassuring exam. Hernia reduced w/out evidence of incarceration or obstruction. Normal BM today, passing gas. Reassuring exam. Labs wnl. Tolerating PO. will d/c home with surgical f/u. Given return precautions.

## 2019-01-27 NOTE — ED PROVIDER NOTE - NSFOLLOWUPINSTRUCTIONS_ED_ALL_ED_FT
INGUINAL HERNIA - AfterCare(R) Instructions(ER/ED)     Inguinal Hernia    WHAT YOU NEED TO KNOW:    An inguinal hernia happens when organs or abdominal tissue push through a weak spot in the abdominal wall. The abdominal wall is made of fat and muscle. It holds the intestines in place. The hernia may contain fluid, tissue from the abdomen, or part of an organ (such as an intestine).Inguinal Hernia         DISCHARGE INSTRUCTIONS:    Return to the emergency department if:     You have severe abdominal pain with nausea and vomiting.       Your abdomen is larger than usual.       Your hernia gets bigger or is purple or blue.       You see blood in your bowel movements.      You feel weak, dizzy, or faint.     Contact your healthcare provider if:     You have a fever.      You have questions or concerns about your condition or care.    Medicine: You may need the following:     NSAIDs, such as ibuprofen, help decrease swelling, pain, and fever. NSAIDs can cause stomach bleeding or kidney problems in certain people. If you take blood thinner medicine, always ask your healthcare provider if NSAIDs are safe for you. Always read the medicine label and follow directions.      Take your medicine as directed. Contact your healthcare provider if you think your medicine is not helping or if you have side effects. Tell him or her if you are allergic to any medicine. Keep a list of the medicines, vitamins, and herbs you take. Include the amounts, and when and why you take them. Bring the list or the pill bottles to follow-up visits. Carry your medicine list with you in case of an emergency.    Follow up with your healthcare provider as directed: Write down your questions so you remember to ask them during your visits.    Manage your symptoms and prevent another hernia:     Do not lift anything heavy. Heavy lifting can make your hernia worse or cause another hernia. Ask your healthcare provider how much is safe for you to lift.       Drink liquids as directed. Liquids may prevent constipation and straining during a bowel movement. Ask how much liquid to drink each day and which liquids are best for you.       Eat foods high in fiber. Fiber may prevent constipation and straining during a bowel movement. Foods that contain fiber include fruits, vegetables, beans, lentils, and whole grains.       Maintain a healthy weight. If you are overweight, weight loss may prevent your hernia from getting worse. It may also prevent another hernia. Talk to your healthcare provider about exercise and how to lose weight safely if you are overweight.       Do not smoke. Nicotine and other chemicals in cigarettes and cigars can weaken the abdominal wall. This may increase your risk for another hernia. Ask your healthcare provider for information if you currently smoke and need help to quit. E-cigarettes or smokeless tobacco still contain nicotine. Talk to your healthcare provider before you use these products.

## 2019-01-27 NOTE — ED PROVIDER NOTE - PHYSICAL EXAMINATION
VITAL SIGNS: I have reviewed nursing notes and confirm.  CONSTITUTIONAL: Well-developed; well-nourished; in no acute distress.  SKIN: Agree with RN documentation regarding decubitus evaluation. Remainder of skin exam is warm and dry, no acute rash.  HEAD: Normocephalic; atraumatic.  EYES: PERRL, EOM intact; conjunctiva and sclera clear.  ENT: No nasal discharge; airway clear, + poor dentition/dental caries  NECK: Supple; non tender.  CARD: S1, S2 normal; no murmurs, gallops, or rubs. RRR  RESP: No wheezes, rales or rhonchi. CTA w/good excursion, no inc wob  ABD: Normal bowel sounds; soft; NTND, no evidence of hernia  : circumcised, no lesions, testicles normal lie, non-ttp, no erythema or scrotal edema  EXT: Normal ROM. No clubbing, cyanosis or edema.  LYMPH: No acute cervical adenopathy.  NEURO: Alert, oriented. Grossly unremarkable.  PSYCH: Cooperative, appropriate.

## 2019-01-27 NOTE — ED PROVIDER NOTE - OBJECTIVE STATEMENT
64 y/o M w/no known medical hx (poor historian), undomiciled living in shelter system, p/w L inguinal groin pain, stating that he has had a L sided inguinal hernia for several years that is present on standing and reduces when he lies down that wouldn't reduce today after straining very hard on the toilet to have a BM. At time of interview, pt's hernia was reduced, and he states that after arriving in the ED awaiting evaluation he was able to reduce it. No urinary sx, testicular pain, n/v/d. Denies pain of any kind at time of interview.

## 2019-01-31 DIAGNOSIS — R10.32 LEFT LOWER QUADRANT PAIN: ICD-10-CM

## 2019-01-31 DIAGNOSIS — K02.9 DENTAL CARIES, UNSPECIFIED: ICD-10-CM

## 2019-01-31 DIAGNOSIS — K40.90 UNILATERAL INGUINAL HERNIA, WITHOUT OBSTRUCTION OR GANGRENE, NOT SPECIFIED AS RECURRENT: ICD-10-CM

## 2019-02-12 ENCOUNTER — EMERGENCY (EMERGENCY)
Facility: HOSPITAL | Age: 64
LOS: 1 days | Discharge: HOME | End: 2019-02-12
Admitting: EMERGENCY MEDICINE
Payer: COMMERCIAL

## 2019-02-12 VITALS
HEART RATE: 68 BPM | TEMPERATURE: 98 F | DIASTOLIC BLOOD PRESSURE: 79 MMHG | RESPIRATION RATE: 17 BRPM | SYSTOLIC BLOOD PRESSURE: 136 MMHG | OXYGEN SATURATION: 99 %

## 2019-02-12 DIAGNOSIS — Z87.898 PERSONAL HISTORY OF OTHER SPECIFIED CONDITIONS: Chronic | ICD-10-CM

## 2019-02-12 PROCEDURE — 99282 EMERGENCY DEPT VISIT SF MDM: CPT

## 2019-02-12 PROCEDURE — 99283 EMERGENCY DEPT VISIT LOW MDM: CPT | Mod: 25

## 2019-02-12 RX ORDER — ACETAMINOPHEN 500 MG
650 TABLET ORAL ONCE
Qty: 0 | Refills: 0 | Status: COMPLETED | OUTPATIENT
Start: 2019-02-12 | End: 2019-02-12

## 2019-02-12 RX ADMIN — Medication 650 MILLIGRAM(S): at 04:48

## 2019-02-12 NOTE — ED PROVIDER NOTE - NSFOLLOWUPCLINICS_GEN_ALL_ED_FT
[facility] [address] [place] [address]  Family Medicine  [address] [place] [address]  Phone: [phone]  Fax: [phone]  Follow Up Time:

## 2019-02-12 NOTE — ED PROVIDER NOTE - MEDICAL DECISION MAKING DETAILS
62 yo M undomicilied, lives in shelter c/o rhinorrhea and HA since yesterday. Denies fever, chills, cough, sore throat, cp, sob, dizziness, n/v. VSS. Well appearing. Pharynx clear. Lungs cta b/l. +URI. Supportive give.

## 2019-02-12 NOTE — ED PROVIDER NOTE - OBJECTIVE STATEMENT
62 yo M undomicilied, lives in shelter c/o rhinorrhea and HA since yesterday. Denies fever, chills, cough, sore throat, cp, sob, dizziness, n/v. Pt requesting tylenol and a sandwich.

## 2019-02-12 NOTE — ED ADULT NURSE NOTE - OBJECTIVE STATEMENT
Patient presents with a chief complaint of generalized pain. No acute cardio-pulmonary distress noted.

## 2019-02-12 NOTE — ED ADULT NURSE NOTE - NSIMPLEMENTINTERV_GEN_ALL_ED
Implemented All Universal Safety Interventions:  [place] to call system. Call bell, personal items and telephone within reach. Instruct patient to call for assistance. Room bathroom lighting operational. Non-slip footwear when patient is off stretcher. Physically safe environment: no spills, clutter or unnecessary equipment. Stretcher in lowest position, wheels locked, appropriate side rails in place.

## 2019-02-16 ENCOUNTER — EMERGENCY (EMERGENCY)
Facility: HOSPITAL | Age: 64
LOS: 1 days | Discharge: HOME | End: 2019-02-16
Attending: EMERGENCY MEDICINE | Admitting: EMERGENCY MEDICINE
Payer: COMMERCIAL

## 2019-02-16 VITALS
HEART RATE: 66 BPM | RESPIRATION RATE: 18 BRPM | DIASTOLIC BLOOD PRESSURE: 79 MMHG | OXYGEN SATURATION: 98 % | SYSTOLIC BLOOD PRESSURE: 131 MMHG

## 2019-02-16 VITALS
OXYGEN SATURATION: 98 % | HEART RATE: 65 BPM | RESPIRATION RATE: 19 BRPM | DIASTOLIC BLOOD PRESSURE: 75 MMHG | TEMPERATURE: 98 F | WEIGHT: 175.93 LBS | SYSTOLIC BLOOD PRESSURE: 134 MMHG

## 2019-02-16 DIAGNOSIS — R51 HEADACHE: ICD-10-CM

## 2019-02-16 DIAGNOSIS — Z87.898 PERSONAL HISTORY OF OTHER SPECIFIED CONDITIONS: Chronic | ICD-10-CM

## 2019-02-16 DIAGNOSIS — J06.9 ACUTE UPPER RESPIRATORY INFECTION, UNSPECIFIED: ICD-10-CM

## 2019-02-16 PROCEDURE — 99283 EMERGENCY DEPT VISIT LOW MDM: CPT | Mod: 25

## 2019-02-16 PROCEDURE — 99283 EMERGENCY DEPT VISIT LOW MDM: CPT

## 2019-02-16 RX ORDER — MULTIVIT WITH MIN/MFOLATE/K2 340-15/3 G
1 POWDER (GRAM) ORAL ONCE
Qty: 0 | Refills: 0 | Status: COMPLETED | OUTPATIENT
Start: 2019-02-16 | End: 2019-02-16

## 2019-02-16 RX ADMIN — Medication 1 BOTTLE: at 07:59

## 2019-02-16 NOTE — ED PROVIDER NOTE - ATTENDING CONTRIBUTION TO CARE
63M with chronic constipation, heroin abuse, concern for constipation. last BM 1.5 days ago No nausea, no vomiting, no diarrhea, no fever/chills. Abd bloating, receives magnesium citrate. Nontender abd no nausea, no vomiting, bowel sounds present. Plan for magnesium citrate, and dc home. + flatus.

## 2019-02-16 NOTE — ED ADULT NURSE NOTE - CHPI ED NUR SYMPTOMS NEG
no burning urination/no chills/no diarrhea/no fever/no dysuria/no hematuria/no nausea/no abdominal distension/no blood in stool/no vomiting

## 2019-02-16 NOTE — ED PROVIDER NOTE - CLINICAL SUMMARY MEDICAL DECISION MAKING FREE TEXT BOX
63 year old male, pmhx chronic constipation and heroin abuse, and reducible left  inguinal hernia, presents to the ed for constipation. states last bm 1.5 days ago- no associated nausea, vomitting diarrhea, fevers or chills. continues to pass flatus and tolerate PO. adomen is soft, no presence of inguinal hernia. patient is given mag citrate. I have a low suspicion for SBO given history of constipation, HPI, physical exam. At this time, the evidence for any other entities in the differential is insufficient to justify any further testing. This was discussed and explained to the patient. It was advised that persistent or worsening symptoms would require further evaluation. This was discussed with the patient and family using shared decision making. ED evaluation and management discussed with the patient and family (if available) in detail.  Close PMD follow up encouraged.  Strict ED return instructions discussed in detail and patient given the opportunity to ask any questions about their discharge diagnosis and instructions. Patient verbalized understanding. Patient is agreeable to plan. 63 year old male, pmhx chronic constipation and heroin abuse, and reducible left  inguinal hernia, presents to the ed for constipation. states last bm 1.5 days ago- no associated nausea, vomitting diarrhea, fevers or chills. continues to pass flatus and tolerate PO. abdomen is soft, no presence of inguinal hernia. patient is given mag citrate. I have a low suspicion for SBO given history of constipation, HPI, physical exam. At this time, the evidence for any other entities in the differential is insufficient to justify any further testing. This was discussed and explained to the patient. It was advised that persistent or worsening symptoms would require further evaluation. This was discussed with the patient and family using shared decision making. ED evaluation and management discussed with the patient and family (if available) in detail.  Close PMD follow up encouraged.  Strict ED return instructions discussed in detail and patient given the opportunity to ask any questions about their discharge diagnosis and instructions. Patient verbalized understanding. Patient is agreeable to plan.

## 2019-02-16 NOTE — ED PROVIDER NOTE - CHIEF COMPLAINT
The patient is a 63y Male complaining of abdominal pain. The patient is a 63y Male complaining pmhx constipation and heroin abuse presenting to ed with concern for constipation.

## 2019-02-16 NOTE — ED PROVIDER NOTE - PHYSICAL EXAMINATION
General: Patient is well developed and well nourised. Patient is alert and oriented to person, place and date. Patient is laying comfortably in stretcher and appears in no acute distress.  HEENT: Head is normocephalic and atraumatic. Pupils are equal, round and reactive. Extraocular movements intact. No evidence of nystagmus, conjunctival injection, or scleral icterus. External ears symmetric without evidence of discharge.  Nose is symmetric, non-tender, patent without evidence of discharge. Teeth in good repair. Uvula midline.   Neck: Supple with no evidence of lymphadenopathy.  Full range of motion.  Heart: Regular rate and rhythm. No murmurs, rubs or gallops.   Lungs: Clear to auscultation bilaterally with equal chest expansion. No note of wheezes, rhonchi, rales. Equal chest expansion. No note of retractions.  Abdomen: protuberant abdomen. Bowel sounds present in all four quadrants. Soft, non-tender, non-distended without signs of masses, rebound or guarding. No note of hepatosplenomegaly. No CVA tenderness bilaterally. Negative Martínez sign. No pain present over McBurney's point.  Skin: Warm, dry and intact without evidence of rashes, bruising, pallor, jaundice or cyanosis.   Psych: Mood and affect appropriate.

## 2019-02-16 NOTE — ED ADULT TRIAGE NOTE - OTHER COMPLAINTS
CC abd pain, hypochondriac area  radiates to the L side of the abdomen x 2 days denies nausea/vomiting/diarrhea. last bowel movement today AM but admits he is constipated

## 2019-02-16 NOTE — ED ADULT NURSE NOTE - NSIMPLEMENTINTERV_GEN_ALL_ED
Implemented All Universal Safety Interventions:  [place] to call system. Call bell, personal items and telephone within reach. Instruct patient to call for assistance. Room bathroom lighting operational. Non-slip footwear when patient is off stretcher. Physically safe environment: no spills, clutter or unnecessary equipment. Stretcher in lowest position, wheels locked, appropriate side rails in place.

## 2019-02-16 NOTE — ED PROVIDER NOTE - OBJECTIVE STATEMENT
states that he has a history of constipation and this feels similar. states that his belly is "larger then usual". states that he is not experiencing nausea, vomitting diarrhea, fevers or chills. states "I usually come here and get the drink, that helps". states that he has a left inguinal hernia which is reducible and not causing pain at this time. states he continues to pass flatus and eating well.

## 2019-02-16 NOTE — ED PROVIDER NOTE - NSFOLLOWUPINSTRUCTIONS_ED_ALL_ED_FT
please drink plenty of fluids and eat foods with fiber    please avoid using heroin- this will make you more constipated!     Constipation, Adult  Constipation is when a person has fewer bowel movements in a week than normal, has difficulty having a bowel movement, or has stools that are dry, hard, or larger than normal. Constipation may be caused by an underlying condition. It may become worse with age if a person takes certain medicines and does not take in enough fluids.    Follow these instructions at home:  Eating and drinking     Image   Eat foods that have a lot of fiber, such as fresh fruits and vegetables, whole grains, and beans.  Limit foods that are high in fat, low in fiber, or overly processed, such as french fries, hamburgers, cookies, candies, and soda.  Drink enough fluid to keep your urine clear or pale yellow.  General instructions     Exercise regularly or as told by your health care provider.  Go to the restroom when you have the urge to go. Do not hold it in.  Take over-the-counter and prescription medicines only as told by your health care provider. These include any fiber supplements.  Practice pelvic floor retraining exercises, such as deep breathing while relaxing the lower abdomen and pelvic floor relaxation during bowel movements.  Watch your condition for any changes.  Keep all follow-up visits as told by your health care provider. This is important.  Contact a health care provider if:  You have pain that gets worse.  You have a fever.  You do not have a bowel movement after 4 days.  You vomit.  You are not hungry.  You lose weight.  You are bleeding from the anus.  You have thin, pencil-like stools.  Get help right away if:  You have a fever and your symptoms suddenly get worse.  You leak stool or have blood in your stool.  Your abdomen is bloated.  You have severe pain in your abdomen.  You feel dizzy or you faint.  This information is not intended to replace advice given to you by your health care provider. Make sure you discuss any questions you have with your health care provider.    Document Released: 09/15/2005 Document Revised: 07/07/2017 Document Reviewed: 06/07/2017  [employer] Interactive Patient Education © 2019 [employer]

## 2019-02-20 DIAGNOSIS — R19.4 CHANGE IN BOWEL HABIT: ICD-10-CM

## 2019-02-20 DIAGNOSIS — K59.00 CONSTIPATION, UNSPECIFIED: ICD-10-CM

## 2019-04-05 ENCOUNTER — EMERGENCY (EMERGENCY)
Facility: HOSPITAL | Age: 64
LOS: 1 days | Discharge: HOME | End: 2019-04-05
Attending: EMERGENCY MEDICINE | Admitting: EMERGENCY MEDICINE
Payer: COMMERCIAL

## 2019-04-05 VITALS
TEMPERATURE: 97 F | RESPIRATION RATE: 18 BRPM | DIASTOLIC BLOOD PRESSURE: 73 MMHG | HEART RATE: 65 BPM | OXYGEN SATURATION: 96 % | SYSTOLIC BLOOD PRESSURE: 150 MMHG

## 2019-04-05 VITALS
OXYGEN SATURATION: 97 % | TEMPERATURE: 98 F | DIASTOLIC BLOOD PRESSURE: 86 MMHG | HEIGHT: 71 IN | HEART RATE: 89 BPM | WEIGHT: 182.76 LBS | SYSTOLIC BLOOD PRESSURE: 144 MMHG | RESPIRATION RATE: 97 BRPM

## 2019-04-05 DIAGNOSIS — Z87.898 PERSONAL HISTORY OF OTHER SPECIFIED CONDITIONS: Chronic | ICD-10-CM

## 2019-04-05 DIAGNOSIS — K40.91 UNILATERAL INGUINAL HERNIA, WITHOUT OBSTRUCTION OR GANGRENE, RECURRENT: ICD-10-CM

## 2019-04-05 DIAGNOSIS — F17.200 NICOTINE DEPENDENCE, UNSPECIFIED, UNCOMPLICATED: ICD-10-CM

## 2019-04-05 LAB
ALBUMIN SERPL ELPH-MCNC: 4 G/DL — SIGNIFICANT CHANGE UP (ref 3.3–5)
ALP SERPL-CCNC: 101 U/L — SIGNIFICANT CHANGE UP (ref 40–120)
ALT FLD-CCNC: 18 U/L — SIGNIFICANT CHANGE UP (ref 10–45)
ANION GAP SERPL CALC-SCNC: 12 MMOL/L — SIGNIFICANT CHANGE UP (ref 5–17)
AST SERPL-CCNC: 22 U/L — SIGNIFICANT CHANGE UP (ref 10–40)
BASOPHILS # BLD AUTO: 0.03 K/UL — SIGNIFICANT CHANGE UP (ref 0–0.2)
BASOPHILS NFR BLD AUTO: 0.4 % — SIGNIFICANT CHANGE UP (ref 0–2)
BILIRUB SERPL-MCNC: 0.9 MG/DL — SIGNIFICANT CHANGE UP (ref 0.2–1.2)
BUN SERPL-MCNC: 15 MG/DL — SIGNIFICANT CHANGE UP (ref 7–23)
CALCIUM SERPL-MCNC: 9 MG/DL — SIGNIFICANT CHANGE UP (ref 8.4–10.5)
CHLORIDE SERPL-SCNC: 102 MMOL/L — SIGNIFICANT CHANGE UP (ref 96–108)
CO2 SERPL-SCNC: 24 MMOL/L — SIGNIFICANT CHANGE UP (ref 22–31)
CREAT SERPL-MCNC: 1.16 MG/DL — SIGNIFICANT CHANGE UP (ref 0.5–1.3)
EOSINOPHIL # BLD AUTO: 0.11 K/UL — SIGNIFICANT CHANGE UP (ref 0–0.5)
EOSINOPHIL NFR BLD AUTO: 1.3 % — SIGNIFICANT CHANGE UP (ref 0–6)
GLUCOSE SERPL-MCNC: 100 MG/DL — HIGH (ref 70–99)
HCT VFR BLD CALC: 47.9 % — SIGNIFICANT CHANGE UP (ref 39–50)
HGB BLD-MCNC: 15.1 G/DL — SIGNIFICANT CHANGE UP (ref 13–17)
IMM GRANULOCYTES NFR BLD AUTO: 0.7 % — SIGNIFICANT CHANGE UP (ref 0–1.5)
LIDOCAIN IGE QN: 16 U/L — SIGNIFICANT CHANGE UP (ref 7–60)
LYMPHOCYTES # BLD AUTO: 2.45 K/UL — SIGNIFICANT CHANGE UP (ref 1–3.3)
LYMPHOCYTES # BLD AUTO: 28.7 % — SIGNIFICANT CHANGE UP (ref 13–44)
MCHC RBC-ENTMCNC: 29.2 PG — SIGNIFICANT CHANGE UP (ref 27–34)
MCHC RBC-ENTMCNC: 31.5 GM/DL — LOW (ref 32–36)
MCV RBC AUTO: 92.6 FL — SIGNIFICANT CHANGE UP (ref 80–100)
MONOCYTES # BLD AUTO: 0.75 K/UL — SIGNIFICANT CHANGE UP (ref 0–0.9)
MONOCYTES NFR BLD AUTO: 8.8 % — SIGNIFICANT CHANGE UP (ref 2–14)
NEUTROPHILS # BLD AUTO: 5.15 K/UL — SIGNIFICANT CHANGE UP (ref 1.8–7.4)
NEUTROPHILS NFR BLD AUTO: 60.1 % — SIGNIFICANT CHANGE UP (ref 43–77)
NRBC # BLD: 0 /100 WBCS — SIGNIFICANT CHANGE UP (ref 0–0)
PLATELET # BLD AUTO: 132 K/UL — LOW (ref 150–400)
POTASSIUM SERPL-MCNC: 4.5 MMOL/L — SIGNIFICANT CHANGE UP (ref 3.5–5.3)
POTASSIUM SERPL-SCNC: 4.5 MMOL/L — SIGNIFICANT CHANGE UP (ref 3.5–5.3)
PROT SERPL-MCNC: 7.3 G/DL — SIGNIFICANT CHANGE UP (ref 6–8.3)
RBC # BLD: 5.17 M/UL — SIGNIFICANT CHANGE UP (ref 4.2–5.8)
RBC # FLD: 13.7 % — SIGNIFICANT CHANGE UP (ref 10.3–14.5)
SODIUM SERPL-SCNC: 138 MMOL/L — SIGNIFICANT CHANGE UP (ref 135–145)
WBC # BLD: 8.55 K/UL — SIGNIFICANT CHANGE UP (ref 3.8–10.5)
WBC # FLD AUTO: 8.55 K/UL — SIGNIFICANT CHANGE UP (ref 3.8–10.5)

## 2019-04-05 PROCEDURE — 83690 ASSAY OF LIPASE: CPT

## 2019-04-05 PROCEDURE — 74177 CT ABD & PELVIS W/CONTRAST: CPT | Mod: 26

## 2019-04-05 PROCEDURE — 80053 COMPREHEN METABOLIC PANEL: CPT

## 2019-04-05 PROCEDURE — 74177 CT ABD & PELVIS W/CONTRAST: CPT

## 2019-04-05 PROCEDURE — 85025 COMPLETE CBC W/AUTO DIFF WBC: CPT

## 2019-04-05 PROCEDURE — 99284 EMERGENCY DEPT VISIT MOD MDM: CPT | Mod: 25

## 2019-04-05 PROCEDURE — 36415 COLL VENOUS BLD VENIPUNCTURE: CPT

## 2019-04-05 RX ORDER — IOHEXOL 300 MG/ML
30 INJECTION, SOLUTION INTRAVENOUS ONCE
Qty: 0 | Refills: 0 | Status: COMPLETED | OUTPATIENT
Start: 2019-04-05 | End: 2019-04-05

## 2019-04-05 RX ADMIN — IOHEXOL 30 MILLILITER(S): 300 INJECTION, SOLUTION INTRAVENOUS at 04:41

## 2019-04-05 NOTE — ED PROVIDER NOTE - PROVIDER TOKENS
PROVIDER:[TOKEN:[[id number]]],PROVIDER:[TOKEN:[[id number]]],PROVIDER:[TOKEN:[[id number]]]

## 2019-04-05 NOTE — ED PROVIDER NOTE - CROS ED GI ALL NEG
______________________________________________________________________________



Name: NIKOLE URRUTIA                                Exam:Adult Echocardiogram

MRN: [id number]         Study Date: 2019 11:44 AM

Age: 81 yrs

______________________________________________________________________________



Reason For Study: trop leak

Height: 75 in        Weight: 308 lb        BSA: 2.6 m2



______________________________________________________________________________



MMode/2D Measurements & Calculations

IVSd: 1.1 cm                                       Ao root diam: 3.7 cm

LVIDd: 5.2 cm                                      LA dimension: 4.9 cm

LVIDs: 3.5 cm                                      ACS: 1.4 cm

LVPWd: 1.1 cm

IVSs: 1.8 cm



____________________________________________________

LVPWs: 1.7 cm                                      EDV(Teich): 128.1 ml

                                                   ESV(Teich): 50.2 ml



____________________________________________________

LVOT diam: 2.0 cm



Doppler Measurements & Calculations

Ao V2 max: 248.9 cm/sec                                   LV V1 max P.4 mmHg

Ao max P.8 mmHg                                      LV V1 mean P.7 mmHg

Ao V2 mean: 184.3 cm/sec                                  LV V1 max: 104.9 cm/sec

Ao mean PG: 15.0 mmHg                                     LV V1 mean: 76.0 cm/sec

Ao V2 VTI: 48.9 cm                                        LV V1 VTI: 19.2 cm



SHAWNA(I,D): 1.2 cm2

SHAWNA(V,D): 1.3 cm2

___________________________________________________________



SV(LVOT): 60.3 ml                                         TR max christopher: 270.7 cm/sec

                                                          TR max P.4 mmHg

___________________________________________________________



Med Peak E' Christopher: 9.0 cm/sec

Lat Peak E' Christopher: 7.1 cm/sec



______________________________________________________________________________



Procedure

A two-dimensional transthoracic echocardiogram with color flow and Doppler was performed. The study w
as

technically difficult with many images being suboptimal in quality.

Left Ventricle

The left ventricular size, thickness and function are normal. The left ventricle is not well visualiz
ed. The

left ventricular ejection fraction is normal. Regional wall motion abnormalities cannot be excluded d
ue to

limited visualization.

Right Ventricle

The right ventricle is not well visualized.

Atria

The left atrium is moderately dilated. The right atrium is moderately dilated.

Mitral Valve

There is mild mitral valve thickening. There is no mitral valve stenosis. There is trace to mild mitr
al

regurgitation.

Tricuspid Valve

The tricuspid valve is not well visualized. There is no tricuspid stenosis. There is mild tricuspid

regurgitation. Right ventricular systolic pressure is elevated at 40-50mmHg.

Aortic Valve

The aortic valve is not well visualized. Hemodynamically significant valvular aortic stenosis cannot 
be

excluded. No aortic regurgitation is present.

Pulmonic Valve

The pulmonic valve is not well visualized.

Great Vessels

The aortic root is normal size.

Pericardium/Pleura

There is no pericardial effusion.

______________________________________________________________________________





Interpretation Summary

The study was technically difficult with many images being suboptimal in quality.

The left ventricular size, thickness and function are normal

The left ventricular ejection fraction is normal.

Regional wall motion abnormalities cannot be excluded due to limited visualization.

The left ventricle is not well visualized.

The left atrium is moderately dilated.

The right atrium is moderately dilated.

There is trace to mild mitral regurgitation.

Hemodynamically significant valvular aortic stenosis cannot be excluded.

There is mild tricuspid regurgitation.

Right ventricular systolic pressure is elevated at 40-50mmHg.

The right ventricle is not well visualized.





MD Mj Huddleston 2019 01:18 PM negative...

## 2019-04-05 NOTE — ED ADULT NURSE NOTE - OTHER COMPLAINTS
CC of suprabic intermittent pain L x 2 days, no urinary sx. denies fevers nor chills. not painful at the moment

## 2019-04-05 NOTE — ED PROVIDER NOTE - PROGRESS NOTE DETAILS
reducible hernia, given jock strap, recommend surgery f/u for repair  I have discussed the discharge plan with the patient. The patient agrees with the plan, as discussed.  The patient understands Emergency Department diagnosis is a preliminary diagnosis often based on limited information and that the patient must adhere to the follow-up plan as discussed.  The patient understands that if the symptoms worsen the patient may return to the Emergency Department at any time for further evaluation and treatment.

## 2019-04-05 NOTE — ED PROVIDER NOTE - OBJECTIVE STATEMENT
63M no PMH c/o hernia to L scrotum.  pt states has had hernia on and off over past week. states seems to reduce when he relaxes. no vomiting, no diarrhea. no fevers.

## 2019-04-05 NOTE — ED ADULT NURSE NOTE - CHPI ED NUR SYMPTOMS NEG
no tingling/no chills/no decreased eating/drinking/no fever/no nausea/no vomiting/no weakness/no dizziness

## 2019-04-05 NOTE — ED PROVIDER NOTE - NSFOLLOWUPINSTRUCTIONS_ED_ALL_ED_FT
Inguinal Hernia, Adult  An inguinal hernia develops when fat or the intestines push through a weak spot in a muscle where your leg meets your lower abdomen (groin). This creates a bulge. This kind of hernia could also be:    In your scrotum, if you are male.  In folds of skin around your vagina, if you are female.    There are three types of inguinal hernias:    Hernias that can be pushed back into the abdomen (are reducible). This type rarely causes pain.  Hernias that are not reducible (are incarcerated).  Hernias that are not reducible and lose their blood supply (are strangulated). This type of hernia requires emergency surgery.    What are the causes?  This condition is caused by having a weak spot in the muscles or tissues in the groin. This weak spot develops over time. The hernia may poke through the weak spot when you suddenly strain your lower abdominal muscles, such as when you:    Lift a heavy object.  Strain to have a bowel movement. Constipation can lead to straining.  Cough.    What increases the risk?  This condition is more likely to develop in:    Men.  Pregnant women.  People who:    Are overweight.  Work in jobs that require long periods of standing or heavy lifting.  Have had an inguinal hernia before.  Smoke or have lung disease. These factors can lead to long-lasting (chronic) coughing.      What are the signs or symptoms?  Symptoms may depend on the size of the hernia. Often, a small inguinal hernia has no symptoms. Symptoms of a larger hernia may include:    A lump in the groin area. This is easier to see when standing. It might not be visible when lying down.  Pain or burning in the groin. This may get worse when lifting, straining, or coughing.  A dull ache or a feeling of pressure in the groin.  In men, an unusual lump in the scrotum.    Symptoms of a strangulated inguinal hernia may include:    A bulge in your groin that is very painful and tender to the touch.  A bulge that turns red or purple.  Fever, nausea, and vomiting.  Inability to have a bowel movement or to pass gas.    How is this diagnosed?  This condition is diagnosed based on your symptoms, your medical history, and a physical exam. Your health care provider may feel your groin area and ask you to cough.    How is this treated?  Treatment depends on the size of your hernia and whether you have symptoms. If you do not have symptoms, your health care provider may have you watch your hernia carefully and have you come in for follow-up visits. If your hernia is large or if you have symptoms, you may need surgery to repair the hernia.    Follow these instructions at home:  Lifestyle     Avoid lifting heavy objects.  Avoid standing for long periods of time.  Do not use any products that contain nicotine or tobacco, such as cigarettes and e-cigarettes. If you need help quitting, ask your health care provider.  Maintain a healthy weight.  Preventing constipation     Take actions to prevent constipation. Constipation leads to straining with bowel movements, which can make a hernia worse or cause a hernia repair to break down. Your health care provider may recommend that you:    Drink enough fluid to keep your urine pale yellow.  Eat foods that are high in fiber, such as fresh fruits and vegetables, whole grains, and beans.  Limit foods that are high in fat and processed sugars, such as fried or sweet foods.  Take an over-the-counter or prescription medicine for constipation.    General instructions     You may try to push the hernia back in place by very gently pressing on it while lying down. Do not try to force the bulge back in if it will not push in easily.  Watch your hernia for any changes in shape, size, or color. Get help right away if you notice any changes.  Take over-the-counter and prescription medicines only as told by your health care provider.  Keep all follow-up visits as told by your health care provider. This is important.  Contact a health care provider if:  You have a fever.  You develop new symptoms.  Your symptoms get worse.  Get help right away if:  You have pain in your groin that suddenly gets worse.  You have a bulge in your groin that:    Suddenly gets bigger and does not get smaller.  Becomes red or purple or painful to the touch.    You are a man and you have a sudden pain in your scrotum, or the size of your scrotum suddenly changes.  You cannot push the hernia back in place by very gently pressing on it when you are lying down. Do not try to force the bulge back in if it will not push in easily.  You have nausea or vomiting that does not go away.  You have a fast heartbeat.  You cannot have a bowel movement or pass gas.  These symptoms may represent a serious problem that is an emergency. Do not wait to see if the symptoms will go away. Get medical help right away. Call your local emergency services (911 in the [place]     Summary  An inguinal hernia develops when fat or the intestines push through a weak spot in a muscle where your leg meets your lower abdomen (groin).  This condition is caused by having a weak spot in muscles or tissue in your groin.  Symptoms may depend on the size of the hernia, and they may include pain or swelling in your groin. A small inguinal hernia often has no symptoms.  Treatment may not be needed if you do not have symptoms. If you have symptoms or a large hernia, you may need surgery to repair the hernia.  Avoid lifting heavy objects. Also avoid standing for long amounts of time.  This information is not intended to replace advice given to you by your health care provider. Make sure you discuss any questions you have with your health care provider.

## 2019-04-05 NOTE — ED PROVIDER NOTE - CARE PROVIDERS DIRECT ADDRESSES
,DirectAddress_Unknown,[email or web address],DirectAddress_Unknown

## 2019-04-21 ENCOUNTER — EMERGENCY (EMERGENCY)
Facility: HOSPITAL | Age: 64
LOS: 1 days | Discharge: HOME | End: 2019-04-21
Attending: EMERGENCY MEDICINE | Admitting: EMERGENCY MEDICINE
Payer: COMMERCIAL

## 2019-04-21 VITALS
WEIGHT: 183.2 LBS | OXYGEN SATURATION: 100 % | TEMPERATURE: 98 F | HEART RATE: 79 BPM | SYSTOLIC BLOOD PRESSURE: 138 MMHG | RESPIRATION RATE: 16 BRPM | DIASTOLIC BLOOD PRESSURE: 86 MMHG

## 2019-04-21 DIAGNOSIS — K59.00 CONSTIPATION, UNSPECIFIED: ICD-10-CM

## 2019-04-21 DIAGNOSIS — Z87.898 PERSONAL HISTORY OF OTHER SPECIFIED CONDITIONS: Chronic | ICD-10-CM

## 2019-04-21 DIAGNOSIS — R11.2 NAUSEA WITH VOMITING, UNSPECIFIED: ICD-10-CM

## 2019-04-21 DIAGNOSIS — R10.9 UNSPECIFIED ABDOMINAL PAIN: ICD-10-CM

## 2019-04-21 DIAGNOSIS — R10.84 GENERALIZED ABDOMINAL PAIN: ICD-10-CM

## 2019-04-21 LAB
ALBUMIN SERPL ELPH-MCNC: 4.3 G/DL — SIGNIFICANT CHANGE UP (ref 3.3–5)
ALP SERPL-CCNC: 103 U/L — SIGNIFICANT CHANGE UP (ref 40–120)
ALT FLD-CCNC: 18 U/L — SIGNIFICANT CHANGE UP (ref 10–45)
ANION GAP SERPL CALC-SCNC: 9 MMOL/L — SIGNIFICANT CHANGE UP (ref 5–17)
APPEARANCE UR: CLEAR — SIGNIFICANT CHANGE UP
AST SERPL-CCNC: 25 U/L — SIGNIFICANT CHANGE UP (ref 10–40)
BASOPHILS # BLD AUTO: 0.03 K/UL — SIGNIFICANT CHANGE UP (ref 0–0.2)
BASOPHILS NFR BLD AUTO: 0.4 % — SIGNIFICANT CHANGE UP (ref 0–2)
BILIRUB SERPL-MCNC: 0.5 MG/DL — SIGNIFICANT CHANGE UP (ref 0.2–1.2)
BILIRUB UR-MCNC: NEGATIVE — SIGNIFICANT CHANGE UP
BUN SERPL-MCNC: 15 MG/DL — SIGNIFICANT CHANGE UP (ref 7–23)
CALCIUM SERPL-MCNC: 9.9 MG/DL — SIGNIFICANT CHANGE UP (ref 8.4–10.5)
CHLORIDE SERPL-SCNC: 104 MMOL/L — SIGNIFICANT CHANGE UP (ref 96–108)
CO2 SERPL-SCNC: 28 MMOL/L — SIGNIFICANT CHANGE UP (ref 22–31)
COLOR SPEC: YELLOW — SIGNIFICANT CHANGE UP
CREAT SERPL-MCNC: 1.03 MG/DL — SIGNIFICANT CHANGE UP (ref 0.5–1.3)
DIFF PNL FLD: NEGATIVE — SIGNIFICANT CHANGE UP
EOSINOPHIL # BLD AUTO: 0.08 K/UL — SIGNIFICANT CHANGE UP (ref 0–0.5)
EOSINOPHIL NFR BLD AUTO: 1 % — SIGNIFICANT CHANGE UP (ref 0–6)
GLUCOSE SERPL-MCNC: 97 MG/DL — SIGNIFICANT CHANGE UP (ref 70–99)
GLUCOSE UR QL: NEGATIVE — SIGNIFICANT CHANGE UP
HCT VFR BLD CALC: 53.1 % — HIGH (ref 39–50)
HGB BLD-MCNC: 16.9 G/DL — SIGNIFICANT CHANGE UP (ref 13–17)
IMM GRANULOCYTES NFR BLD AUTO: 0.5 % — SIGNIFICANT CHANGE UP (ref 0–1.5)
KETONES UR-MCNC: NEGATIVE — SIGNIFICANT CHANGE UP
LEUKOCYTE ESTERASE UR-ACNC: NEGATIVE — SIGNIFICANT CHANGE UP
LIDOCAIN IGE QN: 32 U/L — SIGNIFICANT CHANGE UP (ref 7–60)
LYMPHOCYTES # BLD AUTO: 2.04 K/UL — SIGNIFICANT CHANGE UP (ref 1–3.3)
LYMPHOCYTES # BLD AUTO: 26.1 % — SIGNIFICANT CHANGE UP (ref 13–44)
MCHC RBC-ENTMCNC: 29.1 PG — SIGNIFICANT CHANGE UP (ref 27–34)
MCHC RBC-ENTMCNC: 31.8 GM/DL — LOW (ref 32–36)
MCV RBC AUTO: 91.4 FL — SIGNIFICANT CHANGE UP (ref 80–100)
MONOCYTES # BLD AUTO: 0.54 K/UL — SIGNIFICANT CHANGE UP (ref 0–0.9)
MONOCYTES NFR BLD AUTO: 6.9 % — SIGNIFICANT CHANGE UP (ref 2–14)
NEUTROPHILS # BLD AUTO: 5.1 K/UL — SIGNIFICANT CHANGE UP (ref 1.8–7.4)
NEUTROPHILS NFR BLD AUTO: 65.1 % — SIGNIFICANT CHANGE UP (ref 43–77)
NITRITE UR-MCNC: NEGATIVE — SIGNIFICANT CHANGE UP
NRBC # BLD: 0 /100 WBCS — SIGNIFICANT CHANGE UP (ref 0–0)
PH UR: 6.5 — SIGNIFICANT CHANGE UP (ref 5–8)
PLATELET # BLD AUTO: 256 K/UL — SIGNIFICANT CHANGE UP (ref 150–400)
POTASSIUM SERPL-MCNC: SIGNIFICANT CHANGE UP MMOL/L (ref 3.5–5.3)
POTASSIUM SERPL-SCNC: SIGNIFICANT CHANGE UP MMOL/L (ref 3.5–5.3)
PROT SERPL-MCNC: 8.1 G/DL — SIGNIFICANT CHANGE UP (ref 6–8.3)
PROT UR-MCNC: NEGATIVE MG/DL — SIGNIFICANT CHANGE UP
RBC # BLD: 5.81 M/UL — HIGH (ref 4.2–5.8)
RBC # FLD: 13.9 % — SIGNIFICANT CHANGE UP (ref 10.3–14.5)
SODIUM SERPL-SCNC: 141 MMOL/L — SIGNIFICANT CHANGE UP (ref 135–145)
SP GR SPEC: 1.02 — SIGNIFICANT CHANGE UP (ref 1–1.03)
UROBILINOGEN FLD QL: 0.2 E.U./DL — SIGNIFICANT CHANGE UP
WBC # BLD: 7.83 K/UL — SIGNIFICANT CHANGE UP (ref 3.8–10.5)
WBC # FLD AUTO: 7.83 K/UL — SIGNIFICANT CHANGE UP (ref 3.8–10.5)

## 2019-04-21 PROCEDURE — 99284 EMERGENCY DEPT VISIT MOD MDM: CPT | Mod: 25

## 2019-04-21 PROCEDURE — 74021 RADEX ABDOMEN 3+ VIEWS: CPT | Mod: 26

## 2019-04-21 RX ORDER — MULTIVIT WITH MIN/MFOLATE/K2 340-15/3 G
1 POWDER (GRAM) ORAL ONCE
Qty: 0 | Refills: 0 | Status: COMPLETED | OUTPATIENT
Start: 2019-04-21 | End: 2019-04-21

## 2019-04-21 RX ORDER — DOCUSATE SODIUM 100 MG
100 CAPSULE ORAL ONCE
Qty: 0 | Refills: 0 | Status: COMPLETED | OUTPATIENT
Start: 2019-04-21 | End: 2019-04-21

## 2019-04-21 RX ORDER — ONDANSETRON 8 MG/1
4 TABLET, FILM COATED ORAL ONCE
Qty: 0 | Refills: 0 | Status: COMPLETED | OUTPATIENT
Start: 2019-04-21 | End: 2019-04-21

## 2019-04-21 RX ADMIN — ONDANSETRON 4 MILLIGRAM(S): 8 TABLET, FILM COATED ORAL at 23:48

## 2019-04-21 NOTE — ED PROVIDER NOTE - CLINICAL SUMMARY MEDICAL DECISION MAKING FREE TEXT BOX
62 y/o male hx of constipation here c/o reoccurring constipation. No fever. Abdominal exam NT/ND. Xray abdomen negative. Stool noted. Given GI regimen with improvement. Labs and VS nml. Pt requesting for sandwich reports relief of symptoms. Advised to increase diet in fiber. Dc and follow-up with GI.

## 2019-04-21 NOTE — ED PROVIDER NOTE - CARE PROVIDER_API CALL
Mauro Hawkins)  [facility]  [address], [address]  [place] [address]  Phone: [phone]  Fax: [phone]  Follow Up Time:

## 2019-04-21 NOTE — ED PROVIDER NOTE - OBJECTIVE STATEMENT
62 y/o male here with midabdominal pain x1 day after eating associated with x1 episode of nausea and vomiting. Pt describes the abdominal pain as if he is "constipated". Pt states he wants to go, but is unable to do so. Pt states last bm was 3 days. Pt reports he suffers from constipation. He denies the following: fever, chills, chest pain, sob, urinary symptoms.

## 2019-04-21 NOTE — ED ADULT NURSE NOTE - NSIMPLEMENTINTERV_GEN_ALL_ED
Implemented All Universal Safety Interventions:  [place] to call system. Call bell, personal items and telephone within reach. Instruct patient to call for assistance. Room bathroom lighting operational. Non-slip footwear when patient is off stretcher. Physically safe environment: no spills, clutter or unnecessary equipment. Stretcher in lowest position, wheels locked, appropriate side rails in place.

## 2019-04-21 NOTE — ED ADULT NURSE NOTE - OBJECTIVE STATEMENT
Pt to ER w/ report of L-sided abd pain.  Pt reports some nausea and 1 episode of vomiting today.  Pt reports constipation.  Pt denies cp/sob/f/c.  Breathing unlabored, skin warm and dry. Abd nontender/nondistended.  Pt asking for food.  Will continue to monitor.

## 2019-04-21 NOTE — ED PROVIDER NOTE - ATTENDING CONTRIBUTION TO CARE
I have seen the pt, reviewed all pertinent clinical data, and I agree with the documentation/care/plan executed by NELLIE Miranda.

## 2019-04-21 NOTE — ED ADULT TRIAGE NOTE - CHIEF COMPLAINT QUOTE
Pt presents c/o LLQ abdo pain w/ x1 episode of vomiting x2 days.  Pt also c/o constipation.  Pt denies fever/chills or hx of abdominal pathology or surgery.

## 2019-04-22 VITALS
HEART RATE: 77 BPM | TEMPERATURE: 98 F | OXYGEN SATURATION: 99 % | DIASTOLIC BLOOD PRESSURE: 75 MMHG | RESPIRATION RATE: 18 BRPM | SYSTOLIC BLOOD PRESSURE: 149 MMHG

## 2019-04-22 PROBLEM — K40.90 UNILATERAL INGUINAL HERNIA, WITHOUT OBSTRUCTION OR GANGRENE, NOT SPECIFIED AS RECURRENT: Chronic | Status: ACTIVE | Noted: 2019-04-05

## 2019-04-22 PROCEDURE — 87086 URINE CULTURE/COLONY COUNT: CPT

## 2019-04-22 PROCEDURE — 83690 ASSAY OF LIPASE: CPT

## 2019-04-22 PROCEDURE — 36415 COLL VENOUS BLD VENIPUNCTURE: CPT

## 2019-04-22 PROCEDURE — 99283 EMERGENCY DEPT VISIT LOW MDM: CPT

## 2019-04-22 PROCEDURE — 74021 RADEX ABDOMEN 3+ VIEWS: CPT

## 2019-04-22 PROCEDURE — 85025 COMPLETE CBC W/AUTO DIFF WBC: CPT

## 2019-04-22 PROCEDURE — 80053 COMPREHEN METABOLIC PANEL: CPT

## 2019-04-22 PROCEDURE — 81003 URINALYSIS AUTO W/O SCOPE: CPT

## 2019-04-22 RX ORDER — POLYETHYLENE GLYCOL 3350 17 G/17G
17 POWDER, FOR SOLUTION ORAL
Qty: 85 | Refills: 0
Start: 2019-04-22 | End: 2019-04-26

## 2019-04-22 RX ORDER — DOCUSATE SODIUM 100 MG
1 CAPSULE ORAL
Qty: 15 | Refills: 0
Start: 2019-04-22 | End: 2019-04-26

## 2019-04-22 RX ADMIN — Medication 100 MILLIGRAM(S): at 01:12

## 2019-04-22 RX ADMIN — Medication 1 BOTTLE: at 00:12

## 2019-04-23 LAB
CULTURE RESULTS: NO GROWTH — SIGNIFICANT CHANGE UP
SPECIMEN SOURCE: SIGNIFICANT CHANGE UP

## 2019-04-24 ENCOUNTER — EMERGENCY (EMERGENCY)
Facility: HOSPITAL | Age: 64
LOS: 1 days | Discharge: HOME | End: 2019-04-24
Admitting: EMERGENCY MEDICINE
Payer: COMMERCIAL

## 2019-04-24 VITALS
TEMPERATURE: 98 F | WEIGHT: 182.98 LBS | RESPIRATION RATE: 18 BRPM | OXYGEN SATURATION: 98 % | SYSTOLIC BLOOD PRESSURE: 141 MMHG | HEART RATE: 80 BPM | DIASTOLIC BLOOD PRESSURE: 85 MMHG

## 2019-04-24 DIAGNOSIS — Z87.898 PERSONAL HISTORY OF OTHER SPECIFIED CONDITIONS: Chronic | ICD-10-CM

## 2019-04-24 PROCEDURE — 82962 GLUCOSE BLOOD TEST: CPT

## 2019-04-24 PROCEDURE — 99283 EMERGENCY DEPT VISIT LOW MDM: CPT

## 2019-04-24 RX ORDER — MULTIVIT WITH MIN/MFOLATE/K2 340-15/3 G
1 POWDER (GRAM) ORAL ONCE
Qty: 0 | Refills: 0 | Status: COMPLETED | OUTPATIENT
Start: 2019-04-24 | End: 2019-04-24

## 2019-04-24 RX ADMIN — Medication 1 BOTTLE: at 15:03

## 2019-04-24 NOTE — ED ADULT NURSE NOTE - CHPI ED NUR SYMPTOMS NEG
no chills/no vomiting/no tingling/no dizziness/no fever/no nausea/no pain/no decreased eating/drinking/no weakness

## 2019-04-24 NOTE — ED ADULT TRIAGE NOTE - CHIEF COMPLAINT QUOTE
Patient states, "I am tired, I was just here two days ago."  Patient denies any CP, SOB, dizziness, fevers, chills, N/V/D or any other complaints at this time.  .

## 2019-04-24 NOTE — ED ADULT NURSE NOTE - CHIEF COMPLAINT
19119 Exp Problem Focused - Mod. Complex The patient is a 63y Male complaining of medical evaluation.

## 2019-04-24 NOTE — ED PROVIDER NOTE - OBJECTIVE STATEMENT
64 yo male with h/o substance abuse, homeless, in the ER c/o generalized fatigue, tiredness. Pt admits that he lost his bed at the shelter and couldn't sleep there last night. Pt also mentioned that he has frequent constipation. Denies n/v, f/c, denies any pain at present. Pt didn't  medications that he was recommended in the ER recently.

## 2019-04-24 NOTE — ED PROVIDER NOTE - NSFOLLOWUPINSTRUCTIONS_ED_ALL_ED_FT
I have discussed the discharge plan with the patient. The patient agrees with the plan, as discussed.  The patient understands Emergency Department diagnosis is a preliminary diagnosis often based on limited information and that the patient must adhere to the follow-up plan as discussed.  The patient understands that if the symptoms worsen or if prescribed medications do not have the desired/planned effect that the patient may return to the Emergency Department at any time for further evaluation and treatment.      Weakness  Weakness is a lack of strength. You may feel weak all over your body (generalized), or you may feel weak in one specific part of your body (focal). There are many potential causes of weakness. Sometimes, the cause of your weakness may not be known. Some causes of weakness can be serious, so it is important to see your doctor.    Follow these instructions at home:  Rest as needed.  Try to get enough sleep. Talk to your doctor about how much sleep you need each night.  Take over-the-counter and prescription medicines only as told by your doctor.  Eat a healthy, well-balanced diet. This includes:    Proteins to build muscles, such as lean meats and fish.  Fresh fruits and vegetables.  Carbohydrates to boost energy, such as whole grains.    Drink enough fluid to keep your pee (urine) clear or pale yellow.  Do strength exercises, such as arm curls and leg raises, for 30 minutes at least 2 days a week or as told by your doctor.  Think about working with a physical therapist or  to help you get stronger.  Keep all follow-up visits as told by your doctor. This is important.  Contact a doctor if:  Your weakness does not get better or it gets worse.  Your weakness affects your ability to:    Think clearly.  Do your normal daily activities.    Get help right away if:  You have sudden weakness.  You have trouble breathing or shortness of breath.  You have problems with your vision.  You have trouble talking or swallowing.  You have trouble standing or walking.  You have chest pain.  You are light-headed.  You pass out (lose consciousness).  This information is not intended to replace advice given to you by your health care provider. Make sure you discuss any questions you have with your health care provider.    Constipation, Adult  Constipation is when a person:    Poops (has a bowel movement) fewer times in a week than normal.  Has a hard time pooping.  Has poop that is dry, hard, or bigger than normal.    Follow these instructions at home:  Eating and drinking     Image   Eat foods that have a lot of fiber, such as:    Fresh fruits and vegetables.  Whole grains.  Beans.    Eat less of foods that are high in fat, low in fiber, or overly processed, such as:    French fries.  Hamburgers.  Cookies.  Candy.  Soda.    Drink enough fluid to keep your pee (urine) clear or pale yellow.  General instructions     Exercise regularly or as told by your doctor.  Go to the restroom when you feel like you need to poop. Do not hold it in.  Take over-the-counter and prescription medicines only as told by your doctor. These include any fiber supplements.  Do pelvic floor retraining exercises, such as:    Doing deep breathing while relaxing your lower belly (abdomen).  Relaxing your pelvic floor while pooping.    Watch your condition for any changes.  Keep all follow-up visits as told by your doctor. This is important.  Contact a doctor if:  You have pain that gets worse.  You have a fever.  You have not pooped for 4 days.  You throw up (vomit).  You are not hungry.  You lose weight.  You are bleeding from the anus.  You have thin, pencil-like poop (stool).  Get help right away if:  You have a fever, and your symptoms suddenly get worse.  You leak poop or have blood in your poop.  Your belly feels hard or bigger than normal (is bloated).  You have very bad belly pain.  You feel dizzy or you faint.  This information is not intended to replace advice given to you by your health care provider. Make sure you discuss any questions you have with your health care provider.

## 2019-04-24 NOTE — ED PROVIDER NOTE - CLINICAL SUMMARY MEDICAL DECISION MAKING FREE TEXT BOX
64 y/o male hx of constipation here c/o generalized fatigue, tiredness, reoccurring constipation. reports he had no bed last night at the shelter  No fever. Abdominal exam NT/ND.  VSS,  Pt requesting for sandwich reports relief of symptoms shortly after he was given a chir in the treatment area, requesting to get a bottle of magnesium citrate as its the only medications that usually works for his constipation. Pt advised to increase diet in fiber. Dc  home stable, and follow-up with GI.

## 2019-04-28 DIAGNOSIS — R53.83 OTHER FATIGUE: ICD-10-CM

## 2019-04-28 DIAGNOSIS — Z87.891 PERSONAL HISTORY OF NICOTINE DEPENDENCE: ICD-10-CM

## 2019-04-28 DIAGNOSIS — K59.00 CONSTIPATION, UNSPECIFIED: ICD-10-CM

## 2019-04-28 DIAGNOSIS — Z79.899 OTHER LONG TERM (CURRENT) DRUG THERAPY: ICD-10-CM

## 2019-05-13 ENCOUNTER — EMERGENCY (EMERGENCY)
Facility: HOSPITAL | Age: 64
LOS: 1 days | Discharge: LEFT BEFORE DISCHARGE | End: 2019-05-13
Admitting: EMERGENCY MEDICINE

## 2019-05-13 VITALS
WEIGHT: 169.98 LBS | HEART RATE: 91 BPM | SYSTOLIC BLOOD PRESSURE: 141 MMHG | DIASTOLIC BLOOD PRESSURE: 68 MMHG | RESPIRATION RATE: 16 BRPM | HEIGHT: 71 IN | TEMPERATURE: 98 F | OXYGEN SATURATION: 96 %

## 2019-05-13 DIAGNOSIS — M54.5 LOW BACK PAIN: ICD-10-CM

## 2019-05-13 DIAGNOSIS — Z79.899 OTHER LONG TERM (CURRENT) DRUG THERAPY: ICD-10-CM

## 2019-05-13 DIAGNOSIS — Z87.898 PERSONAL HISTORY OF OTHER SPECIFIED CONDITIONS: Chronic | ICD-10-CM

## 2019-05-13 NOTE — ED ADULT NURSE NOTE - OBJECTIVE STATEMENT
Patient sent in for Urgent Care for elevated BP. Per patient, he went to Urgent Care for sore throat and right neck tenderness. Patient denies chest pain, sob, dizziness, headache, blurry vision. requesting new walking cane as well

## 2019-05-13 NOTE — ED ADULT NURSE NOTE - CHPI ED NUR SYMPTOMS NEG
no difficulty bearing weight/no bowel dysfunction/no constipation/no bladder dysfunction/no anorexia/no neck tenderness/no tingling/no fatigue/no motor function loss/no numbness

## 2019-05-13 NOTE — ED ADULT NURSE NOTE - MUSCULOSKELETAL WDL
Please let mother know that this is quite common - falling into sleep and coming out of sleep. If occurring on a consistent basis, please let me know.   Full range of motion of upper and lower extremities, no joint tenderness/swelling.

## 2019-05-29 ENCOUNTER — EMERGENCY (EMERGENCY)
Facility: HOSPITAL | Age: 64
LOS: 1 days | Discharge: HOME | End: 2019-05-29
Admitting: EMERGENCY MEDICINE
Payer: COMMERCIAL

## 2019-05-29 ENCOUNTER — EMERGENCY (EMERGENCY)
Facility: HOSPITAL | Age: 64
LOS: 1 days | Discharge: HOME | End: 2019-05-29
Attending: EMERGENCY MEDICINE | Admitting: EMERGENCY MEDICINE
Payer: COMMERCIAL

## 2019-05-29 VITALS
TEMPERATURE: 98 F | SYSTOLIC BLOOD PRESSURE: 115 MMHG | HEART RATE: 95 BPM | RESPIRATION RATE: 18 BRPM | OXYGEN SATURATION: 98 % | HEIGHT: 71 IN | WEIGHT: 177.91 LBS | DIASTOLIC BLOOD PRESSURE: 66 MMHG

## 2019-05-29 VITALS
OXYGEN SATURATION: 98 % | RESPIRATION RATE: 18 BRPM | TEMPERATURE: 98 F | DIASTOLIC BLOOD PRESSURE: 74 MMHG | HEART RATE: 65 BPM | WEIGHT: 177.91 LBS | SYSTOLIC BLOOD PRESSURE: 122 MMHG | HEIGHT: 71 IN

## 2019-05-29 DIAGNOSIS — Z79.899 OTHER LONG TERM (CURRENT) DRUG THERAPY: ICD-10-CM

## 2019-05-29 DIAGNOSIS — Z87.898 PERSONAL HISTORY OF OTHER SPECIFIED CONDITIONS: Chronic | ICD-10-CM

## 2019-05-29 DIAGNOSIS — Z03.89 ENCOUNTER FOR OBSERVATION FOR OTHER SUSPECTED DISEASES AND CONDITIONS RULED OUT: ICD-10-CM

## 2019-05-29 DIAGNOSIS — K59.09 OTHER CONSTIPATION: ICD-10-CM

## 2019-05-29 PROCEDURE — 99283 EMERGENCY DEPT VISIT LOW MDM: CPT

## 2019-05-29 PROCEDURE — 99282 EMERGENCY DEPT VISIT SF MDM: CPT

## 2019-05-29 RX ORDER — SODIUM CHLORIDE 9 MG/ML
1000 INJECTION INTRAMUSCULAR; INTRAVENOUS; SUBCUTANEOUS ONCE
Refills: 0 | Status: DISCONTINUED | OUTPATIENT
Start: 2019-05-29 | End: 2019-06-02

## 2019-05-29 RX ORDER — IOHEXOL 300 MG/ML
30 INJECTION, SOLUTION INTRAVENOUS ONCE
Refills: 0 | Status: DISCONTINUED | OUTPATIENT
Start: 2019-05-29 | End: 2019-06-02

## 2019-05-29 NOTE — ED PROVIDER NOTE - OBJECTIVE STATEMENT
63yoM hx of L groin hernia, prior methadone use, here with 3 days of constipation, last BM was 3 days ago, was small and hard. Still passing flatus. C/o intermittent lower abd pain. No n/v, no distension. No f/c, no urinary sx. No back pain. No other complaints.

## 2019-05-29 NOTE — ED PROVIDER NOTE - NS ED ROS FT
GEN: no f/c, no malaise  HEENT: no nasal congestion, no sore throat   CV: no chest pain, no palpitations  RESP: no cough, no SOB  GI: +abd pain, no nausea, no vomiting, no diarrhea, +constipation  : no dysuria or frequency  MSK: no back pain  NEURO: no headache

## 2019-05-29 NOTE — ED PROVIDER NOTE - CLINICAL SUMMARY MEDICAL DECISION MAKING FREE TEXT BOX
This is a pleasant 63 year old male presenting to the ed with reported constipation. history of nasal heroin use and states that typically comes to the er to receive magnesium citrate. states history of left inguinal hernia, no current pain and states that it is reducible. on exa, patient appears well, non=-toxic. abdomen is soft, no apparent hernia. pt politely declines labs and imaging requesting medication and d/c. encouraged to abstain from heroin use and follow up with surgeon for elective surgical repair of hernia. At this time, the evidence for any other entities in the differential is insufficient to justify any further testing. This was discussed and explained to the patient. It was advised that persistent or worsening symptoms would require further evaluation. This was discussed with the patient and family using shared decision making. ED evaluation and management discussed with the patient and family (if available) in detail.  Close PMD follow up encouraged.  Strict ED return instructions discussed in detail and patient given the opportunity to ask any questions about their discharge diagnosis and instructions. Patient verbalized understanding. Patient is agreeable to plan. This is a pleasant 63 year old male presenting to the ed with reported constipation. history of nasal heroin use and states that typically comes to the er to receive magnesium citrate. states history of left inguinal hernia, no current pain and states that it is reducible. on exam, patient appears well, non-toxic. abdomen is soft, no apparent inguinal hernia. pt politely declines labs and imaging, requesting medication and d/c. encouraged to abstain from heroin use and follow up with surgeon for elective surgical repair of hernia. At this time, the evidence for any other entities in the differential is insufficient to justify any further testing. This was discussed and explained to the patient. It was advised that persistent or worsening symptoms would require further evaluation. This was discussed with the patient and family using shared decision making. ED evaluation and management discussed with the patient and family (if available) in detail.  Close PMD follow up encouraged.  Strict ED return instructions discussed in detail and patient given the opportunity to ask any questions about their discharge diagnosis and instructions. Patient verbalized understanding. Patient is agreeable to plan.

## 2019-05-29 NOTE — ED ADULT NURSE NOTE - OBJECTIVE STATEMENT
Pt alert and oriented X3. pt coming from home due "constipation" since Saturday. pt denies n/v/d, fever, chest pain, SOB or blood in the stools/ black stools.  Pt complains of lower abdomen pain 2/10 worst with movement

## 2019-05-29 NOTE — ED PROVIDER NOTE - PHYSICAL EXAMINATION
General: Patient is well developed and well nourised. Patient is alert and oriented to person, place and date. Patient is laying comfortably in stretcher and appears in no acute distress.  HEENT: Head is normocephalic and atraumatic. Pupils are equal, round and reactive. Extraocular movements intact. No evidence of nystagmus, conjunctival injection, or scleral icterus. External ears symmetric without evidence of discharge.  Nose is symmetric, non-tender, patent without evidence of discharge. Teeth in good repair. Uvula midline.   Neck: Supple with no evidence of lymphadenopathy.  Full range of motion.  Heart: Regular rate and rhythm. No murmurs, rubs or gallops.   Lungs: Clear to auscultation bilaterally with equal chest expansion. No note of wheezes, rhonchi, rales. Equal chest expansion. No note of retractions.  Abdomen: Bowel sounds present in all four quadrants. Soft, non-tender, non-distended without signs of masses, rebound or guarding. No note of hepatosplenomegaly. No CVA tenderness bilaterally. Negative Martínez sign. No pain present over McBurney's point.  Neuro: GCS 15. Moving all extremities without discomfort. Strength is 5/5 arms and legs bilaterally. Sensation intact in all four extremities. gait steady   Skin: Warm, dry and intact without evidence of rashes, bruising, pallor, jaundice or cyanosis.   Psych: Mood and affect appropriate.

## 2019-05-29 NOTE — ED ADULT NURSE NOTE - NSIMPLEMENTINTERV_GEN_ALL_ED
Implemented All Universal Safety Interventions:  [place] to call system. Call bell, personal items and telephone within reach. Instruct patient to call for assistance. Room bathroom lighting operational. Non-slip footwear when patient is off stretcher. Physically safe environment: no spills, clutter or unnecessary equipment. Stretcher in lowest position, wheels locked, appropriate side rails in place.

## 2019-05-29 NOTE — ED PROVIDER NOTE - CHIEF COMPLAINT
The patient is a 63y Male left inguinal hernia complaining of constipation. The patient is a 63y Male left inguinal hernia history of heroin abuse complaining of constipation.

## 2019-05-29 NOTE — ED ADULT NURSE NOTE - CHPI ED NUR SYMPTOMS NEG
no abdominal distension/no blood in stool/no vomiting/no dysuria/no diarrhea/no fever/no nausea/no burning urination/no hematuria/no chills

## 2019-05-29 NOTE — ED ADULT TRIAGE NOTE - CHIEF COMPLAINT QUOTE
"I was just here but Im scared of needles so I left but I am still constipated" patient states that he had a BM when he left the ER but still feels constipated

## 2019-05-29 NOTE — ED PROVIDER NOTE - OBJECTIVE STATEMENT
states that he has a history of constipation 2/2 heroin use. states that he often becomes constipated and "I need magnesium citrate. this happens". states that is left inguinal hernia is unchanged. states that it "pops out but I am able to push it back in". no fevers or chills nausea vomiting diarrhea abdominal pain. states that he was in the er earlier today but left "because I am scared of needles". states that when diana left the er he had a bowel movement and passed gas. states that is is not in pain and would not like a medical evaluation. states that he has a history of constipation 2/2 heroin use. states that he often becomes constipated and "I need magnesium citrate. this happens". states that is left inguinal hernia is unchanged. states that it "pops out but I am able to push it back in". states that he was in the er earlier today but left "because I am scared of needles". states that when diana left the er he had a bowel movement and passed gas. states that is is not in pain and would not like a medical evaluation.  no fevers or chills nausea vomiting diarrhea abdominal pain.

## 2019-05-29 NOTE — ED PROVIDER NOTE - NSFOLLOWUPINSTRUCTIONS_ED_ALL_ED_FT
please come back to the er for any worsening of symptoms    please stop using heroin which will help improve your constipation    please follow up with general surgeron for further evaluation of your hernia    Constipation, Adult  Constipation is when a person:  Poops (has a bowel movement) fewer times in a week than normal.  Has a hard time pooping.  Has poop that is dry, hard, or bigger than normal.  Follow these instructions at home:  Eating and drinking     Image   Eat foods that have a lot of fiber, such as:  Fresh fruits and vegetables.  Whole grains.  Beans.  Eat less of foods that are high in fat, low in fiber, or overly processed, such as:  French fries.  Hamburgers.  Cookies.  Candy.  Soda.  Drink enough fluid to keep your pee (urine) clear or pale yellow.  General instructions     Exercise regularly or as told by your doctor.  Go to the restroom when you feel like you need to poop. Do not hold it in.  Take over-the-counter and prescription medicines only as told by your doctor. These include any fiber supplements.  Do pelvic floor retraining exercises, such as:  Doing deep breathing while relaxing your lower belly (abdomen).  Relaxing your pelvic floor while pooping.  Watch your condition for any changes.  Keep all follow-up visits as told by your doctor. This is important.  Contact a doctor if:  You have pain that gets worse.  You have a fever.  You have not pooped for 4 days.  You throw up (vomit).  You are not hungry.  You lose weight.  You are bleeding from the anus.  You have thin, pencil-like poop (stool).  Get help right away if:  You have a fever, and your symptoms suddenly get worse.  You leak poop or have blood in your poop.  Your belly feels hard or bigger than normal (is bloated).  You have very bad belly pain.  You feel dizzy or you faint.  This information is not intended to replace advice given to you by your health care provider. Make sure you discuss any questions you have with your health care provider.    Document Released: 06/05/2009 Document Revised: 07/07/2017 Document Reviewed: 06/07/2017  [employer] Interactive Patient Education © 2019 [employer]

## 2019-05-29 NOTE — ED ADULT TRIAGE NOTE - CHIEF COMPLAINT QUOTE
pt c/o constipation for 3 days, states last BM yesterday but it was small. also c/o diffuse abdominal pain, hx of hernia.

## 2019-05-29 NOTE — ED ADULT NURSE NOTE - CHPI ED NUR SYMPTOMS NEG
no blood in stool/no burning urination/no fever/no vomiting/no dysuria/no nausea/no chills/no hematuria/no diarrhea

## 2019-05-29 NOTE — ED PROVIDER NOTE - PROGRESS NOTE DETAILS
notified by RN that pt walked out prior to getting workup started. Pt was placed into a stretcher for me to evaluate his hernia b/c initial exam was done with pt sitting in a chair, however, he walked out before I was able to re-examine him. He was AOX4, had decision making capacity, was not clinically intoxicated, had steady gait.

## 2019-05-29 NOTE — ED ADULT NURSE NOTE - OBJECTIVE STATEMENT
Pt walked into ED with c/o of constipation for 3 days. He states he had a small BM this morning but felt like he had to strain. he took maalox on Saturday with no relief. Denies blood in stool, N+V+D. HX hernia,

## 2019-05-29 NOTE — ED PROVIDER NOTE - CLINICAL SUMMARY MEDICAL DECISION MAKING FREE TEXT BOX
63yoM here with constipation, reports hx of L inguinal hernia. Vitals stable, exam with soft, nontender, nondistended abdomen, pt o/w well appearing. Plan was for labs/IVF, CT A/P to r/o obstruction, but pt eloped prior to initiation of treatment but after evaluation by me.

## 2019-05-29 NOTE — ED PROVIDER NOTE - PHYSICAL EXAMINATION
GEN: Well appearing, well nourished, awake, alert, oriented to person, place, time/situation and in no apparent distress.  HENMT: Airway patent, neck supple. No stridor.  EYES: No pallor or scleral icterus.  CARDIAC: Normal rate, regular rhythm.  Heart sounds S1, S2.  No murmurs, rubs or gallops.   RESP: Breath sounds clear and equal bilaterally. Normal work of breathing. No respiratory distress.   GI: Abdomen soft, non-tender, no rebound or guarding. No distension. Bowel sounds present. No CVAT.   BACK: No midline spinal ttp  MSK: FROM, no obvious deformities, no pedal edema  NEURO: Alert and oriented x3, MAEx4 purposefully, follows commands appropriately, normal gait   PSYCH: Calm and cooperative

## 2019-08-31 ENCOUNTER — EMERGENCY (EMERGENCY)
Facility: HOSPITAL | Age: 64
LOS: 1 days | Discharge: HOME | End: 2019-08-31
Attending: EMERGENCY MEDICINE | Admitting: EMERGENCY MEDICINE
Payer: MEDICARE

## 2019-08-31 VITALS
DIASTOLIC BLOOD PRESSURE: 77 MMHG | TEMPERATURE: 98 F | OXYGEN SATURATION: 99 % | SYSTOLIC BLOOD PRESSURE: 134 MMHG | RESPIRATION RATE: 18 BRPM

## 2019-08-31 VITALS
DIASTOLIC BLOOD PRESSURE: 76 MMHG | SYSTOLIC BLOOD PRESSURE: 148 MMHG | TEMPERATURE: 98 F | RESPIRATION RATE: 16 BRPM | HEIGHT: 71 IN | HEART RATE: 84 BPM | WEIGHT: 158.95 LBS

## 2019-08-31 DIAGNOSIS — Z87.898 PERSONAL HISTORY OF OTHER SPECIFIED CONDITIONS: Chronic | ICD-10-CM

## 2019-08-31 LAB
ALBUMIN SERPL ELPH-MCNC: 3.9 G/DL — SIGNIFICANT CHANGE UP (ref 3.3–5)
ALP SERPL-CCNC: 90 U/L — SIGNIFICANT CHANGE UP (ref 40–120)
ALT FLD-CCNC: 17 U/L — SIGNIFICANT CHANGE UP (ref 10–45)
ANION GAP SERPL CALC-SCNC: 8 MMOL/L — SIGNIFICANT CHANGE UP (ref 5–17)
APPEARANCE UR: CLEAR — SIGNIFICANT CHANGE UP
APTT BLD: 33.6 SEC — SIGNIFICANT CHANGE UP (ref 27.5–36.3)
AST SERPL-CCNC: 21 U/L — SIGNIFICANT CHANGE UP (ref 10–40)
BASOPHILS # BLD AUTO: 0.04 K/UL — SIGNIFICANT CHANGE UP (ref 0–0.2)
BASOPHILS NFR BLD AUTO: 0.5 % — SIGNIFICANT CHANGE UP (ref 0–2)
BILIRUB SERPL-MCNC: 0.5 MG/DL — SIGNIFICANT CHANGE UP (ref 0.2–1.2)
BILIRUB UR-MCNC: NEGATIVE — SIGNIFICANT CHANGE UP
BUN SERPL-MCNC: 20 MG/DL — SIGNIFICANT CHANGE UP (ref 7–23)
CALCIUM SERPL-MCNC: 9.2 MG/DL — SIGNIFICANT CHANGE UP (ref 8.4–10.5)
CHLORIDE SERPL-SCNC: 104 MMOL/L — SIGNIFICANT CHANGE UP (ref 96–108)
CO2 SERPL-SCNC: 26 MMOL/L — SIGNIFICANT CHANGE UP (ref 22–31)
COLOR SPEC: YELLOW — SIGNIFICANT CHANGE UP
CREAT SERPL-MCNC: 1.21 MG/DL — SIGNIFICANT CHANGE UP (ref 0.5–1.3)
DIFF PNL FLD: NEGATIVE — SIGNIFICANT CHANGE UP
EOSINOPHIL # BLD AUTO: 0.26 K/UL — SIGNIFICANT CHANGE UP (ref 0–0.5)
EOSINOPHIL NFR BLD AUTO: 3.5 % — SIGNIFICANT CHANGE UP (ref 0–6)
GLUCOSE SERPL-MCNC: 84 MG/DL — SIGNIFICANT CHANGE UP (ref 70–99)
GLUCOSE UR QL: NEGATIVE — SIGNIFICANT CHANGE UP
HCT VFR BLD CALC: 47.1 % — SIGNIFICANT CHANGE UP (ref 39–50)
HGB BLD-MCNC: 14.8 G/DL — SIGNIFICANT CHANGE UP (ref 13–17)
IMM GRANULOCYTES NFR BLD AUTO: 0.8 % — SIGNIFICANT CHANGE UP (ref 0–1.5)
INR BLD: 1.15 — SIGNIFICANT CHANGE UP (ref 0.88–1.16)
KETONES UR-MCNC: NEGATIVE — SIGNIFICANT CHANGE UP
LEUKOCYTE ESTERASE UR-ACNC: ABNORMAL
LYMPHOCYTES # BLD AUTO: 2.82 K/UL — SIGNIFICANT CHANGE UP (ref 1–3.3)
LYMPHOCYTES # BLD AUTO: 37.5 % — SIGNIFICANT CHANGE UP (ref 13–44)
MCHC RBC-ENTMCNC: 28.7 PG — SIGNIFICANT CHANGE UP (ref 27–34)
MCHC RBC-ENTMCNC: 31.4 GM/DL — LOW (ref 32–36)
MCV RBC AUTO: 91.3 FL — SIGNIFICANT CHANGE UP (ref 80–100)
MONOCYTES # BLD AUTO: 0.48 K/UL — SIGNIFICANT CHANGE UP (ref 0–0.9)
MONOCYTES NFR BLD AUTO: 6.4 % — SIGNIFICANT CHANGE UP (ref 2–14)
NEUTROPHILS # BLD AUTO: 3.85 K/UL — SIGNIFICANT CHANGE UP (ref 1.8–7.4)
NEUTROPHILS NFR BLD AUTO: 51.3 % — SIGNIFICANT CHANGE UP (ref 43–77)
NITRITE UR-MCNC: NEGATIVE — SIGNIFICANT CHANGE UP
NRBC # BLD: 0 /100 WBCS — SIGNIFICANT CHANGE UP (ref 0–0)
PH UR: 6 — SIGNIFICANT CHANGE UP (ref 5–8)
PLATELET # BLD AUTO: 153 K/UL — SIGNIFICANT CHANGE UP (ref 150–400)
POTASSIUM SERPL-MCNC: 4.5 MMOL/L — SIGNIFICANT CHANGE UP (ref 3.5–5.3)
POTASSIUM SERPL-SCNC: 4.5 MMOL/L — SIGNIFICANT CHANGE UP (ref 3.5–5.3)
PROT SERPL-MCNC: 7.2 G/DL — SIGNIFICANT CHANGE UP (ref 6–8.3)
PROT UR-MCNC: NEGATIVE MG/DL — SIGNIFICANT CHANGE UP
PROTHROM AB SERPL-ACNC: 13.1 SEC — HIGH (ref 10–12.9)
RBC # BLD: 5.16 M/UL — SIGNIFICANT CHANGE UP (ref 4.2–5.8)
RBC # FLD: 14.2 % — SIGNIFICANT CHANGE UP (ref 10.3–14.5)
SODIUM SERPL-SCNC: 138 MMOL/L — SIGNIFICANT CHANGE UP (ref 135–145)
SP GR SPEC: >=1.03 — SIGNIFICANT CHANGE UP (ref 1–1.03)
UROBILINOGEN FLD QL: 0.2 E.U./DL — SIGNIFICANT CHANGE UP
WBC # BLD: 7.51 K/UL — SIGNIFICANT CHANGE UP (ref 3.8–10.5)
WBC # FLD AUTO: 7.51 K/UL — SIGNIFICANT CHANGE UP (ref 3.8–10.5)

## 2019-08-31 PROCEDURE — 85610 PROTHROMBIN TIME: CPT

## 2019-08-31 PROCEDURE — 85025 COMPLETE CBC W/AUTO DIFF WBC: CPT

## 2019-08-31 PROCEDURE — 99284 EMERGENCY DEPT VISIT MOD MDM: CPT

## 2019-08-31 PROCEDURE — 80053 COMPREHEN METABOLIC PANEL: CPT

## 2019-08-31 PROCEDURE — 99284 EMERGENCY DEPT VISIT MOD MDM: CPT | Mod: 25

## 2019-08-31 PROCEDURE — 85730 THROMBOPLASTIN TIME PARTIAL: CPT

## 2019-08-31 PROCEDURE — 36415 COLL VENOUS BLD VENIPUNCTURE: CPT

## 2019-08-31 PROCEDURE — 87086 URINE CULTURE/COLONY COUNT: CPT

## 2019-08-31 PROCEDURE — 81001 URINALYSIS AUTO W/SCOPE: CPT

## 2019-08-31 RX ORDER — IOHEXOL 300 MG/ML
30 INJECTION, SOLUTION INTRAVENOUS ONCE
Refills: 0 | Status: COMPLETED | OUTPATIENT
Start: 2019-08-31 | End: 2019-08-31

## 2019-08-31 RX ADMIN — IOHEXOL 30 MILLILITER(S): 300 INJECTION, SOLUTION INTRAVENOUS at 07:43

## 2019-08-31 NOTE — ED PROVIDER NOTE - NSFOLLOWUPINSTRUCTIONS_ED_ALL_ED_FT
Follow up in the surgery clinic this week.  Please call [phone] to schedule an appointment in the acute care surgery clinic.           Inguinal Hernia, Adult  ImageAn inguinal hernia develops when fat or the intestines push through a weak spot in a muscle where your leg meets your lower abdomen (groin). This creates a bulge. This kind of hernia could also be:  In your scrotum, if you are male.  In folds of skin around your vagina, if you are female.  There are three types of inguinal hernias:  Hernias that can be pushed back into the abdomen (are reducible). This type rarely causes pain.  Hernias that are not reducible (are incarcerated).  Hernias that are not reducible and lose their blood supply (are strangulated). This type of hernia requires emergency surgery.  What are the causes?  This condition is caused by having a weak spot in the muscles or tissues in the groin. This weak spot develops over time. The hernia may poke through the weak spot when you suddenly strain your lower abdominal muscles, such as when you:  Lift a heavy object.  Strain to have a bowel movement. Constipation can lead to straining.  Cough.  What increases the risk?  This condition is more likely to develop in:  Men.  Pregnant women.  People who:  Are overweight.  Work in jobs that require long periods of standing or heavy lifting.  Have had an inguinal hernia before.  Smoke or have lung disease. These factors can lead to long-lasting (chronic) coughing.  What are the signs or symptoms?  Symptoms may depend on the size of the hernia. Often, a small inguinal hernia has no symptoms. Symptoms of a larger hernia may include:  A lump in the groin area. This is easier to see when standing. It might not be visible when lying down.  Pain or burning in the groin. This may get worse when lifting, straining, or coughing.  A dull ache or a feeling of pressure in the groin.  In men, an unusual lump in the scrotum.  Symptoms of a strangulated inguinal hernia may include:  A bulge in your groin that is very painful and tender to the touch.  A bulge that turns red or purple.  Fever, nausea, and vomiting.  Inability to have a bowel movement or to pass gas.  How is this diagnosed?  This condition is diagnosed based on your symptoms, your medical history, and a physical exam. Your health care provider may feel your groin area and ask you to cough.    How is this treated?  Treatment depends on the size of your hernia and whether you have symptoms. If you do not have symptoms, your health care provider may have you watch your hernia carefully and have you come in for follow-up visits. If your hernia is large or if you have symptoms, you may need surgery to repair the hernia.    Follow these instructions at home:  Lifestyle     Avoid lifting heavy objects.  Avoid standing for long periods of time.  Do not use any products that contain nicotine or tobacco, such as cigarettes and e-cigarettes. If you need help quitting, ask your health care provider.  Maintain a healthy weight.  Preventing constipation     Take actions to prevent constipation. Constipation leads to straining with bowel movements, which can make a hernia worse or cause a hernia repair to break down. Your health care provider may recommend that you:  Drink enough fluid to keep your urine pale yellow.  Eat foods that are high in fiber, such as fresh fruits and vegetables, whole grains, and beans.  Limit foods that are high in fat and processed sugars, such as fried or sweet foods.  Take an over-the-counter or prescription medicine for constipation.  General instructions     You may try to push the hernia back in place by very gently pressing on it while lying down. Do not try to force the bulge back in if it will not push in easily.  Watch your hernia for any changes in shape, size, or color. Get help right away if you notice any changes.  Take over-the-counter and prescription medicines only as told by your health care provider.  Keep all follow-up visits as told by your health care provider. This is important.  Contact a health care provider if:  You have a fever.  You develop new symptoms.  Your symptoms get worse.  Get help right away if:  You have pain in your groin that suddenly gets worse.  You have a bulge in your groin that:  Suddenly gets bigger and does not get smaller.  Becomes red or purple or painful to the touch.  You are a man and you have a sudden pain in your scrotum, or the size of your scrotum suddenly changes.  You cannot push the hernia back in place by very gently pressing on it when you are lying down. Do not try to force the bulge back in if it will not push in easily.  You have nausea or vomiting that does not go away.  You have a fast heartbeat.  You cannot have a bowel movement or pass gas.  These symptoms may represent a serious problem that is an emergency. Do not wait to see if the symptoms will go away. Get medical help right away. Call your local emergency services (911 in the [place]     Summary  An inguinal hernia develops when fat or the intestines push through a weak spot in a muscle where your leg meets your lower abdomen (groin).  This condition is caused by having a weak spot in muscles or tissue in your groin.  Symptoms may depend on the size of the hernia, and they may include pain or swelling in your groin. A small inguinal hernia often has no symptoms.  Treatment may not be needed if you do not have symptoms. If you have symptoms or a large hernia, you may need surgery to repair the hernia.  Avoid lifting heavy objects. Also avoid standing for long amounts of time.  This information is not intended to replace advice given to you by your health care provider. Make sure you discuss any questions you have with your health care provider.    Document Released: 05/06/2010 Document Revised: 01/19/2019 Document Reviewed: 09/19/2018  [employer] Interactive Patient Education © 2019 [employer]

## 2019-08-31 NOTE — ED ADULT NURSE NOTE - OBJECTIVE STATEMENT
Received a 63 year old male with a chief complaint of left lower abdominal pain. Patient reports that he has a history of left inguinal hernia which exacerbated in the past 48 hours. Patient reported to have attempted to place it back in and reports no success. Reported medical history of Left Inguinal hernia and substance abuse.

## 2019-08-31 NOTE — ED PROVIDER NOTE - CLINICAL SUMMARY MEDICAL DECISION MAKING FREE TEXT BOX
62 yo male with h/o substance abuse, h/o frequent constipation, in the ER due to abdominal pain, and palpable , painful left inguinal hernia since yesterday. Pt  states he is  not able to reduce it. Denies nausea, vomiting, blood in his stool. Plan : labs, CT, surgey consult.

## 2019-08-31 NOTE — ED ADULT NURSE NOTE - CHPI ED NUR SYMPTOMS NEG
no fever/no nausea/no dysuria/no hematuria/no chills/no diarrhea/no blood in stool/no burning urination/no vomiting/no abdominal distension

## 2019-08-31 NOTE — ED PROVIDER NOTE - PROGRESS NOTE DETAILS
case discussed with surgery on call. imaging recommended. pending CT Pt reports hernia spontaneously reduce. pt evaluated by surgery in ED and hernia is reduced. Surgery recommends canceling ct scan and f/u with surgery as outpt. abd non tender. no palpable hernia at this time.

## 2019-08-31 NOTE — CONSULT NOTE ADULT - SUBJECTIVE AND OBJECTIVE BOX
HPI:  This is a 64 y/o male with PMH significant with long term heroin abuse and brain tumor removed 52 years ago for which the patient does not know much details.   He has a left inguinal hernia, for which he comes to the ER often, he says it pops out every time he uses heroin.   He said this time the heroin must have been bad, the hernia was more swollen and harder to reduce than usual.   He denies nausea or vomiting, his last BM was yesterday, he is not sure if he is passing gas.   He does not have medical follow up.     PMH: Heroin abuse, not IV   PSH: brain tumor resection at age 11 at [facility], patient does not know the nature of it.   FH: no family history of malignant hyperthermia   SH: currently living in [place] [facility]. rescue [place], current smoker, current heroin abuse   Allergies: No Known Allergies    REVIEW OF SYSTEMS:     Neuro: no headache   Eye: no vision disturbances  ENT: no dysphagia   Pulm: no shortness of breath  Cardio-vascular: no chest pain   GI: no nausea / vomitting   : no dysuria   ID: no fever   Skin: no skin changes over hernia   Endocrine: no polyuria / polydipsia   Heme: no bleeding tendency         Vital Signs Last 24 Hrs  T(C): 36.6 (31 Aug 2019 05:49), Max: 36.6 (31 Aug 2019 05:49)  T(F): 97.8 (31 Aug 2019 05:49), Max: 97.8 (31 Aug 2019 05:49)  HR: 84 (31 Aug 2019 05:49) (84 - 84)  BP: 148/76 (31 Aug 2019 05:49) (148/76 - 148/76)  BP(mean): --  RR: 16 (31 Aug 2019 05:49) (16 - 16)  SpO2: --    PHYSICAL EXAM  Neuro: awake and alert, no focal deficit   HEENT: normocephalic, no scleral ictera   Pulm: non labored breathing on room air, lungs are clear to auscultation   CV: regular rate and rhythm, no murmur to ausculation, no carotid bruit   GI: abdomen is soft non tender to palaption, no hepatomegaly   : Testicle non swollen, left inguinal hernia reduced    MSK: no swelling, no deformity  Skin: no rash, no wound   Psych: cooperative, appropriate mood and affect     LABS:                        14.8   7.51  )-----------( 153      ( 31 Aug 2019 06:36 )             47.1     08-31    138  |  104  |  20  ----------------------------<  84  4.5   |  26  |  1.21    Ca    9.2      31 Aug 2019 06:36    TPro  7.2  /  Alb  3.9  /  TBili  0.5  /  DBili  x   /  AST  21  /  ALT  17  /  AlkPhos  90  08-31    PT/INR - ( 31 Aug 2019 06:36 )   PT: 13.1 sec;   INR: 1.15          PTT - ( 31 Aug 2019 06:36 )  PTT:33.6 sec  Urinalysis Basic - ( 31 Aug 2019 07:39 )    Color: Yellow / Appearance: Clear / SG: >=1.030 / pH: x  Gluc: x / Ketone: NEGATIVE  / Bili: Negative / Urobili: 0.2 E.U./dL   Blood: x / Protein: NEGATIVE mg/dL / Nitrite: NEGATIVE   Leuk Esterase: Trace / RBC: < 5 /HPF / WBC > 10 /HPF   Sq Epi: x / Non Sq Epi: 0-5 /HPF / Bacteria: Present /HPF        RADIOLOGY & ADDITIONAL STUDIES:

## 2019-08-31 NOTE — ED PROVIDER NOTE - ATTENDING CONTRIBUTION TO CARE
63M c/o abd pain.  +L sided hernia.  no vomiting, no fevers.  +constipation.  no recent travel. no sick contacts.  gen- nad  heent- ncat, clear conj  cv -rrr  lungs -ctab  abd - soft, +L inguinal hernia, nonreducible  ext -wwp  neuro -aox3, steady gait, webb  +inguinal hernia - abd pain, pending labs, CT, surgery consulted

## 2019-08-31 NOTE — CONSULT NOTE ADULT - ASSESSMENT
This is a 64 y/o M with PMH of brain tumor removed at age 11 unknown path and chronic heroin abuse.   Presented to the ED for incarcerated left inguinal hernia.   No symptoms of bowel obstruction, no skin changes    Hernia reduced spontaneously with ice packs     Plan:  - Patient can be discharged from ER   - No need for imaging   - Follow up in [facility] clinic for hernia treatment   - Stopping use of heroin was strongly encouraged

## 2019-08-31 NOTE — ED PROVIDER NOTE - PATIENT PORTAL LINK FT
You can access the FollowMyHealth Patient Portal offered by [facility] by registering at the following website: [email or web address]. By joining [facility]’s FollowMyHealth portal, you will also be able to view your health information using other applications (apps) compatible with our system.

## 2019-08-31 NOTE — ED ADULT NURSE NOTE - NSIMPLEMENTINTERV_GEN_ALL_ED
Implemented All Universal Safety Interventions:  [place] to call system. Call bell, personal items and telephone within reach. Instruct patient to call for assistance. Room bathroom lighting operational. Non-slip footwear when patient is off stretcher. Physically safe environment: no spills, clutter or unnecessary equipment. Stretcher in lowest position, wheels locked, appropriate side rails in place.

## 2019-09-01 LAB
CULTURE RESULTS: NO GROWTH — SIGNIFICANT CHANGE UP
SPECIMEN SOURCE: SIGNIFICANT CHANGE UP

## 2019-09-05 DIAGNOSIS — K40.90 UNILATERAL INGUINAL HERNIA, WITHOUT OBSTRUCTION OR GANGRENE, NOT SPECIFIED AS RECURRENT: ICD-10-CM

## 2019-09-05 DIAGNOSIS — R10.9 UNSPECIFIED ABDOMINAL PAIN: ICD-10-CM

## 2019-12-29 ENCOUNTER — EMERGENCY (EMERGENCY)
Facility: HOSPITAL | Age: 64
LOS: 1 days | Discharge: HOME | End: 2019-12-29
Attending: EMERGENCY MEDICINE | Admitting: EMERGENCY MEDICINE
Payer: MEDICARE

## 2019-12-29 VITALS
SYSTOLIC BLOOD PRESSURE: 149 MMHG | TEMPERATURE: 97 F | RESPIRATION RATE: 16 BRPM | DIASTOLIC BLOOD PRESSURE: 97 MMHG | WEIGHT: 174.17 LBS | OXYGEN SATURATION: 97 % | HEART RATE: 78 BPM

## 2019-12-29 DIAGNOSIS — M79.661 PAIN IN RIGHT LOWER LEG: ICD-10-CM

## 2019-12-29 DIAGNOSIS — Z87.898 PERSONAL HISTORY OF OTHER SPECIFIED CONDITIONS: Chronic | ICD-10-CM

## 2019-12-29 DIAGNOSIS — M62.81 MUSCLE WEAKNESS (GENERALIZED): ICD-10-CM

## 2019-12-29 DIAGNOSIS — M25.551 PAIN IN RIGHT HIP: ICD-10-CM

## 2019-12-29 PROCEDURE — 96372 THER/PROPH/DIAG INJ SC/IM: CPT

## 2019-12-29 PROCEDURE — 73502 X-RAY EXAM HIP UNI 2-3 VIEWS: CPT | Mod: 26,RT

## 2019-12-29 PROCEDURE — 73502 X-RAY EXAM HIP UNI 2-3 VIEWS: CPT

## 2019-12-29 PROCEDURE — 99283 EMERGENCY DEPT VISIT LOW MDM: CPT | Mod: 25

## 2019-12-29 PROCEDURE — 99283 EMERGENCY DEPT VISIT LOW MDM: CPT

## 2019-12-29 RX ORDER — SODIUM CHLORIDE 9 MG/ML
1000 INJECTION INTRAMUSCULAR; INTRAVENOUS; SUBCUTANEOUS ONCE
Refills: 0 | Status: DISCONTINUED | OUTPATIENT
Start: 2019-12-29 | End: 2019-12-29

## 2019-12-29 RX ORDER — KETOROLAC TROMETHAMINE 30 MG/ML
30 SYRINGE (ML) INJECTION ONCE
Refills: 0 | Status: DISCONTINUED | OUTPATIENT
Start: 2019-12-29 | End: 2019-12-29

## 2019-12-29 RX ADMIN — Medication 30 MILLIGRAM(S): at 13:35

## 2019-12-29 NOTE — ED PROVIDER NOTE - MUSCULOSKELETAL, MLM
Spine appears normal, there is no midline cervical, thoracic or lumbar spine pain/tenderness/stepoff/deformity.  + TTP over right lateral aspect of hip/thigh.  No calf pain/tenderness on right, no reproducible knee, ankle or foot pain to affected RUE.  No pain on palpation over ischial tuberosity on right.  + Ambulating with limp (patient states this is baseline for him x many years).  range of motion is not limited, no muscle or joint tenderness.  No deformities.

## 2019-12-29 NOTE — ED PROVIDER NOTE - CLINICAL SUMMARY MEDICAL DECISION MAKING FREE TEXT BOX
Patient in ED with concern for right leg pain.  Patient non toxic with benign physical exam.  Bearing weight.  US and x rays ordered however patient eloped from ED prior to completion of studies.

## 2019-12-29 NOTE — ED PROVIDER NOTE - OBJECTIVE STATEMENT
64 year old male with history of right sided weakness following childhood injury presents to ED with concern for right hip/leg pain and swelling over the past 2 days.  Patient states symptoms are constant and worse with ambulation.  He denies trauma to extremity, inability to ambulate, change in baseline weakness to extremity, back pain, loss of bowel/bladder function, fever, chills, chest pain, shortness of breath, calf pain/tenderness, knee/ankle pain, skin breakdown/lesions or any additional acute complaints or concerns at this time.

## 2019-12-29 NOTE — ED PROVIDER NOTE - NEUROLOGICAL, MLM
Alert and oriented, no focal deficits, no motor or sensory deficits.  5/5 strength x 4 extremities against gravity and external force.  Sensation intact and symmetric x 4 extremities.

## 2020-01-05 NOTE — ED ADULT NURSE NOTE - NS ED NURSE DC TEACHING
INITIAL ORTHOPAEDIC CONSULTATION    12/11/2019      PMD:   No Pcp  CONSULTING PROVIDER:  Anahi Moralez MD  DOI:   10/29/17  W/C:  No. (Insurance denied, patient declined to appeal.)  EMPLOYER:  [employer]  JOB POSITION:   Manufacturing  CURRENT RESTRICTIONS:  None  :  No    REASON FOR CONSULTATION:   Right lateral epicondylitis, left carpal tunnel syndrome    HISTORY OF PRESENT ILLNESS: Benjy Smart is a 57 year old male seen in consultation for Anahi Moralez MD regarding chronic issues with both upper extremities. He has a history of right lateral elbow pain dating back about 2 years. He also reports numbness and tingling in his left hand. He was originally seen at [facility]. Initially, he was seen under workers comp. However, insurance denied his claim, and patient states that he does not intend to appeal.    He states that despite having chronic pain in his right lateral elbow, it seems to have settled down over time. 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    His biggest complaint today is ongoing numbness and tingling in his left hand. This is primarily in the thumb through ring fingers. He notes occasional \"shocks\". He's tried icing, bracing, occupational therapy. He actually has some of the symptoms in both hands, the left side is worse than the right. Keep him awake at nighttime. He has used a brace, but it's not effective. It does not appear that he has had this worked up significantly in the past. No prior EMG.    He denies any neck pain.    Past Medical History:   Diagnosis Date   • Diabetes Mellitus    • Negative History of Asthma    • Negative History of Cancer    • Negative History of Heart disease    • Negative History of Hypertension      Past Surgical History:   Procedure Laterality Date   • Elbow surgery  2005   • Osteotomy prox phalanx,1st toe  1993    fx 1st toe with surg.   • Past surgical history  02/05    left elbow surgery     Family History   Problem  Relation Age of Onset   • High blood pressure Mother      Social History     Tobacco Use   • Smoking status: Former Smoker     Packs/day: 0.10     Years: 19.00     Pack years: 1.90     Types: Cigarettes     Last attempt to quit: 2016     Years since quittin.0   • Smokeless tobacco: Never Used   • Tobacco comment:    Substance Use Topics   • Alcohol use: Yes     Alcohol/week: 0.0 standard drinks     Comment: Drinks 1-2 beers q wk.    • Drug use: Not on file     Current Outpatient Medications   Medication Sig   • metformin (GLUCOPHAGE) 1000 MG tablet TAKE ONE TABLET BY MOUTH TWICE DAILY WITH MEALS   • metformin (GLUMETZA) 1000 MG 24 hr tablet Take 1 tablet by mouth daily (with breakfast).     No current facility-administered medications for this visit.      ALLERGIES:   Allergen Reactions   • Percocet [Oxycodone-Acetaminophen] RASH   • Neurontin [Gabapentin] GI UPSET, INSOMNIA and SHORTNESS OF BREATH       REVIEW OF SYSTEMS:  Constitutional:  Denies fever or chills   Eyes:  Denies change in visual acuity   HENT: Denies nasal congestion or sore throat   Respiratory:  Denies cough or shortness of breath   Cardiovascular:  Denies chest pain or edema  Gastrointestinal:  Denies abdominal pain, nausea, vomiting, bloody stools or diarrhea   Genitourinary:  Denies dysuria   Musculoskeletal:  Denies back pain or joint pain, except as indicated in HPI  Integument:  Denies rash   Neurologic:  Denies headache, focal weakness or sensory changes, except as indicated in HPI  Endocrine:  Denies polyuria or polydipsia  Lymphatic:  Denies swollen glands  Psychiatric:  Denies depression or anxiety    PHYSICAL EXAM:   Visit Vitals  Ht 5' 9\" (1.753 m)   Wt 83.5 kg   BMI 27.17 kg/m²      General:  Well groomed, in no apparent distress.    HEENT:  Eyes normal, pupils equal, round and reactive to light and accommodation (PERRLA), sinuses nontender, nose normal, pharynx clear.    Neck:  Supple, no lymphadenopathy,  no thyromegaly.    Lungs:  Clear to auscultation (A), Normal inspiratory/expiratory effort.  Cardiac:  Regular rate and rhythm, (RRR), no murmur  Abdomen:  Soft, nontender (NT), no guarding.    Extremities:  No cyanosis, clubbing, edema.   Skin:  No abnormal skin lesions.    Neurologic: Alert and oriented x 4. Alert and cooperative. Cranial nerves II-XII intact.  Reflexes 2+ and symmetrical throughout. Normal strength and sensation.  Musculoskeletal: Right elbow examination today reveals some vague mild tenderness over the lateral elbow. Nontender over the medial aspect or over the biceps or triceps. No instability. Normal range of motion. No joint effusion. Nontender over the ulnar nerve at the medial elbow.    In the left hand, he has subjective tingling in the thumb through ring fingers. Tinel's and Phalen's are both positive. No thenar wasting. APB strength 5/5. Normal intrinsic function. Nontender over the ulnar nerve at the wrist or elbow. Pulses and capillary refill are normal.    X-RAY INTERPRETATION:   10/23/18. X-rays left shoulder. Degenerative change without acute fracture or dislocation.     EMG:   No EMG     IMPRESSION/DIAGNOSIS:   1. Right lateral epicondylitis, currently stable  2. Left carpal tunnel syndrome    TREATMENT AND RECOMMENDATIONS: Options were discussed. Continue conservative treatment for now. We will obtain an EMG to rule out carpal tunnel syndrome on the left side. Reviewed precautions. He is okay to continue his current work capacity. Follow-up after the EMG.        Pillo Wiseman MD             Respiratory

## 2020-01-17 ENCOUNTER — EMERGENCY (EMERGENCY)
Facility: HOSPITAL | Age: 65
LOS: 1 days | Discharge: HOME | End: 2020-01-17
Attending: EMERGENCY MEDICINE | Admitting: EMERGENCY MEDICINE
Payer: SELF-PAY

## 2020-01-17 VITALS
TEMPERATURE: 98 F | OXYGEN SATURATION: 98 % | HEART RATE: 85 BPM | DIASTOLIC BLOOD PRESSURE: 67 MMHG | RESPIRATION RATE: 18 BRPM | SYSTOLIC BLOOD PRESSURE: 130 MMHG | HEIGHT: 67 IN | WEIGHT: 179.9 LBS

## 2020-01-17 DIAGNOSIS — Z87.898 PERSONAL HISTORY OF OTHER SPECIFIED CONDITIONS: Chronic | ICD-10-CM

## 2020-01-17 PROCEDURE — 99282 EMERGENCY DEPT VISIT SF MDM: CPT

## 2020-01-17 PROCEDURE — 99283 EMERGENCY DEPT VISIT LOW MDM: CPT

## 2020-01-17 PROCEDURE — 99053 MED SERV 10PM-8AM 24 HR FAC: CPT

## 2020-01-17 NOTE — ED ADULT NURSE NOTE - CHPI ED NUR SYMPTOMS NEG
no fever/no chills/no nausea/no hematuria/no abdominal distension/no vomiting/no dysuria/no blood in stool/no burning urination

## 2020-01-17 NOTE — ED ADULT NURSE NOTE - OBJECTIVE STATEMENT
pt complains of lower center abdominal pain since yesterday along with brown liquid diarrhea X10 episodes yesterday and 2 episodes today. Pain decreased from 10/10 to 5/10 from yesterday to today, Pt did not take any meds. Pt denies any n/v, or dizziness.

## 2020-01-17 NOTE — ED ADULT NURSE NOTE - NSIMPLEMENTINTERV_GEN_ALL_ED
Implemented All Universal Safety Interventions:  [place] to call system. Call bell, personal items and telephone within reach. Instruct patient to call for assistance. Room bathroom lighting operational. Non-slip footwear when patient is off stretcher. Physically safe environment: no spills, clutter or unnecessary equipment. Stretcher in lowest position, wheels locked, appropriate side rails in place.

## 2020-01-18 RX ORDER — IOHEXOL 300 MG/ML
30 INJECTION, SOLUTION INTRAVENOUS ONCE
Refills: 0 | Status: DISCONTINUED | OUTPATIENT
Start: 2020-01-18 | End: 2020-01-18

## 2020-01-18 RX ORDER — KETOROLAC TROMETHAMINE 30 MG/ML
30 SYRINGE (ML) INJECTION ONCE
Refills: 0 | Status: DISCONTINUED | OUTPATIENT
Start: 2020-01-18 | End: 2020-01-18

## 2020-01-18 RX ORDER — SODIUM CHLORIDE 9 MG/ML
1000 INJECTION INTRAMUSCULAR; INTRAVENOUS; SUBCUTANEOUS ONCE
Refills: 0 | Status: DISCONTINUED | OUTPATIENT
Start: 2020-01-18 | End: 2020-02-03

## 2020-01-18 NOTE — ED PROVIDER NOTE - OBJECTIVE STATEMENT
64M no PMH c/o abd pain. pt states pain around his belly button.  no n/v. no fevers. states ongoing for past 2 days.  states started shortly after snorting cocaine.  some loose nonbloody stool.  no recent travel.

## 2020-01-18 NOTE — ED PROVIDER NOTE - PATIENT PORTAL LINK FT
You can access the FollowMyHealth Patient Portal offered by [facility] by registering at the following website: [email or web address]. By joining [employer]’s FollowMyHealth portal, you will also be able to view your health information using other applications (apps) compatible with our system.

## 2020-01-18 NOTE — ED PROVIDER NOTE - CLINICAL SUMMARY MEDICAL DECISION MAKING FREE TEXT BOX
umbilical abd pain, no guarding, no rebound on exam, doubt acute surgical abd at this time  -check labs  -ivf, toradol  _CT

## 2020-01-18 NOTE — ED PROVIDER NOTE - PROGRESS NOTE DETAILS
pt states feeling much better, did not receive any medication here, labs not sent.  pt states does not wish to stay in the ED any longer, no abd pain.  states does not wish to have CT or labs. requesting discharge, abd soft NT, ND.    I have discussed the discharge plan with the patient. The patient agrees with the plan, as discussed.  The patient understands Emergency Department diagnosis is a preliminary diagnosis often based on limited information and that the patient must adhere to the follow-up plan as discussed.  The patient understands that if the symptoms worsen the patient may return to the Emergency Department at any time for further evaluation and treatment.

## 2020-01-18 NOTE — ED PROVIDER NOTE - ENMT, MLM
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 Patient known it's a possible bowel obstruction. The symptoms are the same as when he was diagnosed w/ bowel obstructions. Patient also stated usually is resolved w/ Morphine and NGT.

## 2020-01-22 DIAGNOSIS — R10.33 PERIUMBILICAL PAIN: ICD-10-CM

## 2020-01-22 DIAGNOSIS — R19.7 DIARRHEA, UNSPECIFIED: ICD-10-CM

## 2020-03-15 ENCOUNTER — EMERGENCY (EMERGENCY)
Facility: HOSPITAL | Age: 65
LOS: 1 days | Discharge: HOME | End: 2020-03-15
Attending: EMERGENCY MEDICINE | Admitting: EMERGENCY MEDICINE
Payer: MEDICARE

## 2020-03-15 VITALS
HEART RATE: 69 BPM | OXYGEN SATURATION: 96 % | SYSTOLIC BLOOD PRESSURE: 93 MMHG | WEIGHT: 160.06 LBS | TEMPERATURE: 98 F | RESPIRATION RATE: 18 BRPM | HEIGHT: 71 IN | DIASTOLIC BLOOD PRESSURE: 58 MMHG

## 2020-03-15 DIAGNOSIS — R09.89 OTHER SPECIFIED SYMPTOMS AND SIGNS INVOLVING THE CIRCULATORY AND RESPIRATORY SYSTEMS: ICD-10-CM

## 2020-03-15 DIAGNOSIS — M54.5 LOW BACK PAIN: ICD-10-CM

## 2020-03-15 DIAGNOSIS — Z87.898 PERSONAL HISTORY OF OTHER SPECIFIED CONDITIONS: Chronic | ICD-10-CM

## 2020-03-15 PROCEDURE — 99284 EMERGENCY DEPT VISIT MOD MDM: CPT

## 2020-03-15 NOTE — ED ADULT TRIAGE NOTE - CHIEF COMPLAINT QUOTE
BIBA for lower back pain x 1.5 days. admits to drinking vodka and whiskey 2 days ago. no obvious signs of injury/trauma noted.

## 2020-03-15 NOTE — ED PROVIDER NOTE - NSFOLLOWUPINSTRUCTIONS_ED_ALL_ED_FT
limit alcohol intake    don't use drugs    stay well hydrated     follow up with your doctor as scheduled    return to ER if symptoms worsen or for any other concern

## 2020-03-15 NOTE — ED PROVIDER NOTE - PATIENT PORTAL LINK FT
You can access the FollowMyHealth Patient Portal offered by [facility] by registering at the following website: [email or web address]. By joining [employer]’s FollowMyHealth portal, you will also be able to view your health information using other applications (apps) compatible with our system.

## 2020-03-15 NOTE — ED PROVIDER NOTE - CLINICAL SUMMARY MEDICAL DECISION MAKING FREE TEXT BOX
Pt was BIBA for lower back pain. No signs of clinical intoxication or acute trauma on exam. Give food and water as requested. Pt ready for dc. No other medical intervention required. Pt was BIBA for lower back pain. No signs of clinical intoxication or acute trauma on exam. Give food and water as requested. Pt ready for dc. No other medical intervention required.    ED evaluation and management discussed with the patient and family (if available) in detail.  Close PMD follow up encouraged.  Strict ED return instructions discussed in detail and patient given the opportunity to ask any questions about their discharge diagnosis and instructions. Patient verbalized understanding.   no red flag back pain signs or sx    ate and drank without difficulty fully ambulatory

## 2020-03-15 NOTE — ED PROVIDER NOTE - OBJECTIVE STATEMENT
64 y o male with PMHx of inguinal hernia and substance abuse was BIBA for lower back pain. Upon speaking to pt he states that he thinks he "passed out". He denies chest pain, SOB, abd pain, N/V/D, fever, chills, or other sx. Pt requesting food and water. He denies recent etoh or illicit drug use. Admits drinking vodka and whiskey 2 days ago and last substance use was on the "first of the month". Pt also endorses runny nose x1 day, otherwise no other medical complaints.

## 2020-03-15 NOTE — ED PROVIDER NOTE - MUSCULOSKELETAL, MLM
Spine appears normal, range of motion is not limited, no muscle or joint tenderness. Pt able to quickly stand, bear full weight, and ambulate without difficulty.

## 2020-03-20 ENCOUNTER — EMERGENCY (EMERGENCY)
Facility: HOSPITAL | Age: 65
LOS: 1 days | Discharge: HOME | End: 2020-03-20
Attending: EMERGENCY MEDICINE | Admitting: EMERGENCY MEDICINE
Payer: SELF-PAY

## 2020-03-20 VITALS
HEART RATE: 65 BPM | OXYGEN SATURATION: 95 % | RESPIRATION RATE: 18 BRPM | SYSTOLIC BLOOD PRESSURE: 109 MMHG | TEMPERATURE: 98 F | DIASTOLIC BLOOD PRESSURE: 74 MMHG

## 2020-03-20 VITALS
HEART RATE: 73 BPM | RESPIRATION RATE: 18 BRPM | DIASTOLIC BLOOD PRESSURE: 145 MMHG | HEIGHT: 71 IN | OXYGEN SATURATION: 98 % | TEMPERATURE: 99 F | SYSTOLIC BLOOD PRESSURE: 167 MMHG

## 2020-03-20 DIAGNOSIS — F10.10 ALCOHOL ABUSE, UNCOMPLICATED: ICD-10-CM

## 2020-03-20 DIAGNOSIS — R41.82 ALTERED MENTAL STATUS, UNSPECIFIED: ICD-10-CM

## 2020-03-20 DIAGNOSIS — Z87.898 PERSONAL HISTORY OF OTHER SPECIFIED CONDITIONS: Chronic | ICD-10-CM

## 2020-03-20 DIAGNOSIS — F11.10 OPIOID ABUSE, UNCOMPLICATED: ICD-10-CM

## 2020-03-20 PROCEDURE — 99283 EMERGENCY DEPT VISIT LOW MDM: CPT

## 2020-03-20 PROCEDURE — 82962 GLUCOSE BLOOD TEST: CPT

## 2020-03-20 PROCEDURE — 99284 EMERGENCY DEPT VISIT MOD MDM: CPT

## 2020-03-20 NOTE — ED PROVIDER NOTE - PATIENT PORTAL LINK FT
You can access the FollowMyHealth Patient Portal offered by [facility] by registering at the following website: [email or web address]. By joining [employer]’s FollowMyHealth portal, you will also be able to view your health information using other applications (apps) compatible with our system.

## 2020-03-20 NOTE — ED ADULT NURSE NOTE - INTERVENTIONS DEFINITIONS
Non-slip footwear when patient is off stretcher/Physically safe environment: no spills, clutter or unnecessary equipment/Monitor gait and stability/Stretcher in lowest position, wheels locked, appropriate side rails in place

## 2020-03-20 NOTE — ED PROVIDER NOTE - OBJECTIVE STATEMENT
Pt w/ PMHx polysubstance abuse, PSHx excision of brain tumor BIBA for ? taking too much methadone. Pt awake, alert in the ED, state he drank alcohol this morning and snorted heroin. Pt denies falls /trauma. Pt has no physical complaints.

## 2020-03-20 NOTE — ED ADULT NURSE NOTE - OBJECTIVE STATEMENT
Pt. BIBA for AMS. Belongings secured, changed into yellow gown and red socks for safety. Pt. is A&Ox3 at this time, talkative, cooperative, states he "passed out" earlier after drinking 2 pints of coconut vodka and snorting opiates. Denies any fall, no visible injuries noted. Denies pain, discomfort, other Sx. Able to ambulate independently in ED, states he used to use a cane. Given sandwich and juice.

## 2020-03-20 NOTE — ED ADULT TRIAGE NOTE - CHIEF COMPLAINT QUOTE
pt BIBEMS from street after friend called 911 for "he took too much methadone." pt uncooperative and yelling out in triage. fs 82 by EMS.

## 2020-03-20 NOTE — ED PROVIDER NOTE - PHYSICAL EXAMINATION
Constitutional: Well appearing, well nourished, awake, alert, oriented to person, place, time/situation and in no apparent distress.   Head atraumatic, normocephalic. No signs of trauma  ENMT: Airway patent. Normal MM  Eyes: Clear bilaterally, PERRL, EOMI  Cardiac: Normal rate, regular rhythm.  Heart sounds S1, S2.  No murmurs, rubs or gallops.  Respiratory: Breaths sounds equal and clear b/l. No increased WOB, tachypnea, hypoxia, or accessory mm use. Pt speaks in full sentences.   Gastrointestinal: Abd soft, NT, ND, NABS. No guarding, rebound, or rigidity. No pulsatile abdominal masses. No organomegaly appreciated. No CVAT   Musculoskeletal: Range of motion is not limited. FROM all joints x 4 ext w/o dec ROM, pain, or signs of trauma  Neuro: Alert and oriented x 3, face symmetric. Strength 5/5 x 4 ext and symmetric, nml gross motor movement, nml gait. No focal deficits noted.   Skin: Skin normal color for race, warm, dry and intact. No evidence of rash.  Psych: Alert and oriented to person, place, time/situation. normal mood and affect.

## 2020-03-20 NOTE — ED PROVIDER NOTE - NSFOLLOWUPINSTRUCTIONS_ED_ALL_ED_FT
Please refrain from drinking alcohol and using drugs    Polysubstance Abuse    WHAT YOU NEED TO KNOW:    Polysubstance abuse is the abuse of 2 or more drugs that cause impairment or distress. Examples include alcohol, nicotine, marijuana, cocaine, heroin, methamphetamine, hallucinogens such as mushrooms, or inhalants such as paint thinner. Prescribed medicines, such as opioids for pain or benzodiazepines for anxiety, are also commonly abused.    DISCHARGE INSTRUCTIONS:    Call your local emergency number (911 in the ) if:     You feel you might harm yourself or others.        Return to the emergency department if:     You have a seizure.      You have chest pain and your heart is beating faster than usual.      You have new shortness of breath.      You are dizzy and lightheaded.    Call your doctor or therapist if:     You are using drugs and think you are pregnant.      You have withdrawal symptoms and want to start using drugs again.      You have questions or concerns about your condition or care.    Risks of polysubstance abuse:     Drug dependence is when you continue to use drugs, even when you know the risks. Polysubstance abuse can damage your heart, brain, lungs, liver, and gastrointestinal tract. You continue even when it causes problems with work, school, or relationships. You may have difficulty finding or keeping a job because of your drug dependence.      Drug tolerance is when you need to use more drugs, or use them more often, to get the effects you want. You may not be able to stop using the drugs. When you try to stop, you may have withdrawal symptoms and strong cravings for the drugs.      Drug overdose can occur when you take more drugs than your body can handle. This may be a small amount or a large amount. You can lose consciousness or have a seizure or stroke. Your heart can stop beating, or you can stop breathing. You may die from a drug overdose.    Medicines:     Withdrawal medicines may be given according to the types of drugs you are abusing. Withdrawal from drugs can cause serious, life-threatening side effects. Certain medicines can help decrease your withdrawal symptoms and your desire for the drug. Ask for more information about the withdrawal medicines you may need.      Mood stabilizers may be given to help prevent or treat depression or anxiety caused by drug abuse and withdrawal.      Take your medicine as directed. Contact your healthcare provider if you think your medicine is not helping or if you have side effects. Tell him or her if you are allergic to any medicine. Keep a list of the medicines, vitamins, and herbs you take. Include the amounts, and when and why you take them. Bring the list or the pill bottles to follow-up visits. Carry your medicine list with you in case of an emergency.    Follow up with your doctor or therapist as directed: You may be referred to a specialist to treat health conditions caused by your drug use. This includes mental health, heart, or lung specialists. Write down your questions so you remember to ask them during your visits.    Therapy: You may need therapy and support to stop using drugs:     Cognitive and behavioral therapy helps you change your thinking and behavior. It can help you develop plans to avoid the situations that make you want to use drugs. It also helps you cope with the feelings of wanting to use drugs. You may have individual or group therapy.      Contingency management helps you set drug-free goals with a therapist. You will decide ways to celebrate your success when you reach a goal.      Family therapy and support groups allow you and your family members to talk to and be encouraged by other people affected by drug abuse. You and your family members may attend together or separately. Ask your healthcare provider for information about programs in your area.    How polysubstance abuse affects unborn or  babies:     If you are pregnant or get pregnant while using drugs, you may have a miscarriage or give birth early. Your baby may be born addicted to the drugs.      Do not breastfeed your baby if you use drugs. Drugs pass from your bloodstream into your breast milk and affect your baby's health. Talk with your healthcare provider if you are using drugs and breastfeeding.    For support and more information:     Alcoholics Anonymous  Web Address: http://www.aa.org      Substance Abuse and Mental Health Services Administration  [address]  [place] [address]  Web Address: http://www.samhsa.gov

## 2020-03-20 NOTE — ED PROVIDER NOTE - CLINICAL SUMMARY MEDICAL DECISION MAKING FREE TEXT BOX
Pt BIBA for PSA. Pt awake, alert, cooperative. FS wnl. Pt fed in the ED. Pt ambulated w/ steady gait. Anticipate d/c

## 2020-11-11 NOTE — ED ADULT NURSE NOTE - NSIMPLEMENTINTERV_GEN_ALL_ED
Subjective:      Alton Bach is a 40 y.o. year old female who presents [facility] on 11/11/2020 for follow up visit status post B breast reconstruction with direct silicone implant placement on 10/12/20 with subsequent return to OR for removal of nonviable skin of left breast and exchange of implant to tissue expander. Today is her first post op visit since surgery. She was seen and evaluated by myself and Dr. Haresh Porter. Denies fever, chills, nausea, vomiting, or other systemic signs of infection.    Vitals:    11/11/20 1045   BP: (!) 138/93   Pulse: 89        Review of patient's allergies indicates:   Allergen Reactions    Coconut Swelling and Rash    Dye Swelling and Blisters     Hair Dye    Strawberry Swelling and Rash       Current Outpatient Medications on File Prior to Visit   Medication Sig Dispense Refill    HYDROcodone-acetaminophen (NORCO) 5-325 mg per tablet Take 1 tablet by mouth every 4 (four) hours as needed for Pain. 16 tablet 0    ibuprofen (ADVIL,MOTRIN) 600 MG tablet Take 1 tablet (600 mg total) by mouth 3 (three) times daily. 30 tablet 0    multivitamin (ONE DAILY MULTIVITAMIN) per tablet Take 1 tablet by mouth once daily.      oxyCODONE-acetaminophen (PERCOCET) 5-325 mg per tablet Take 1 tablet by mouth every 6 (six) hours as needed for Pain. 43 tablet 0     Current Facility-Administered Medications on File Prior to Visit   Medication Dose Route Frequency Provider Last Rate Last Dose    heparin (porcine) injection 5,000 Units  5,000 Units Subcutaneous Once Karen Garcia MD        sodium chloride 0.9% flush 10 mL  10 mL Intravenous PRN Karen Garcia MD           Patient Active Problem List   Diagnosis    Groin strain    Ductal carcinoma in situ (DCIS) of left breast    Breast cancer       Past Surgical History:   Procedure Laterality Date    breast augmentation      BREAST BIOPSY Right     CHOLECYSTECTOMY      MASTECTOMY Bilateral  10/12/2020    Procedure: MASTECTOMY-Bilateral ;  Surgeon: Anahi Huitron MD;  Location: [address];  Service: General;  Laterality: Bilateral;    REPLACEMENT OF IMPLANT OF BREAST Bilateral 10/12/2020    Procedure: REPLACEMENT, IMPLANT, BREAST-Bilateral;  Surgeon: Haresh Porter MD;  Location: [address];  Service: Plastics;  Laterality: Bilateral;    REPLACEMENT OF IMPLANT OF BREAST Left 11/4/2020    Procedure: Left Breast Implant Exchange;  Surgeon: Haresh Porter MD;  Location: [address];  Service: Plastics;  Laterality: Left;    SENTINEL LYMPH NODE BIOPSY Bilateral 10/12/2020    Procedure: BIOPSY, LYMPH NODE, SENTINEL-Bilateral;  Surgeon: Anahi Huitron MD;  Location: [address];  Service: General;  Laterality: Bilateral;       Social History     Socioeconomic History    Marital status: Single     Spouse name: Not on file    Number of children: Not on file    Years of education: Not on file    Highest education level: Not on file   Occupational History    Not on file   Social Needs    Financial resource strain: Not on file    Food insecurity     Worry: Not on file     Inability: Not on file    Transportation needs     Medical: Not on file     Non-medical: Not on file   Tobacco Use    Smoking status: Current Every Day Smoker     Packs/day: 0.50    Smokeless tobacco: Never Used   Substance and Sexual Activity    Alcohol use: No    Drug use: No    Sexual activity: Not on file   Lifestyle    Physical activity     Days per week: Not on file     Minutes per session: Not on file    Stress: Not on file   Relationships    Social connections     Talks on phone: Not on file     Gets together: Not on file     Attends Bahai service: Not on file     Active member of club or organization: Not on file     Attends meetings of clubs or organizations: Not on file     Relationship status: Not on file   Other Topics Concern    Not on file   Social History Narrative    Not  on file     Date of surgery on 12/29   Preoperative diagnosis breast cancer  Postoperative diagnosis is same  Procedure performed: immediate bilateral breast reconstruction using implants  Placement of AlloDerm a left breast  Placement of AlloDerm right breast  Surgeon German  Anesthesia general  Complications none  Drains times for  Blood loss minimal    Date of surgery 11/04/2020  Preoperative diagnosis breast cancer  2.partial mastectomy flap necrosis secondary to patient noncompliance  Procedure  1)Irrigation and debridement of mastectomy flap skin skin and subcutaneous tissue only measuring approximately 2 cm x 6 cm  2)left implant exchange.    3 Extensive superior capsulotomy left breast  4) removal alloderm  Surgeon German  Anesthesia general  Complications none  Drains x1  Blood loss minimal.       Review of Systems: negative    Objective:     Physical Exam:  Vitals:    11/11/20 1045   BP: (!) 138/93   Pulse: 89       WD WN NAD  VSS  Normal resp effort  L breast - incision CDI, no erythema, serous drainage from incision, surgically absent nipple  R breast - incision CDI, no erythema or drainage, surgically absent nipple        Assessment:       1. Surgery follow-up examination        Plan:   40 y.o. female status post B breast reconstruction   - Patient seen and evaluated by myself and Dr. Haresh Porter    - Serous drainage from incision. Advised to apply bacitracin and nonstick dressing 3 times daily. Patient understands that she is a high risk for losing this tissue expander  - Patient denies smoking or the use of any nicotine containing products  - Return to clinic in 1 week, appointment scheduled      All questions were answered. The patient was advised to call the clinic with any questions or concerns prior to their next visit.           Luz Elena Caldwell PA-C  Plastic and Reconstruction Surgery Department  [phone] office           Implemented All Fall Risk Interventions:  [place] to call system. Call bell, personal items and telephone within reach. Instruct patient to call for assistance. Room bathroom lighting operational. Non-slip footwear when patient is off stretcher. Physically safe environment: no spills, clutter or unnecessary equipment. Stretcher in lowest position, wheels locked, appropriate side rails in place. Provide visual cue, wrist band, yellow gown, etc. Monitor gait and stability. Monitor for mental status changes and reorient to person, place, and time. Review medications for side effects contributing to fall risk. Reinforce activity limits and safety measures with patient and family.

## 2020-12-02 ENCOUNTER — EMERGENCY (EMERGENCY)
Facility: HOSPITAL | Age: 65
LOS: 1 days | Discharge: HOME | End: 2020-12-02
Attending: EMERGENCY MEDICINE | Admitting: EMERGENCY MEDICINE
Payer: MEDICARE

## 2020-12-02 VITALS
OXYGEN SATURATION: 98 % | SYSTOLIC BLOOD PRESSURE: 142 MMHG | RESPIRATION RATE: 16 BRPM | TEMPERATURE: 98 F | HEART RATE: 86 BPM | DIASTOLIC BLOOD PRESSURE: 74 MMHG

## 2020-12-02 VITALS
HEART RATE: 66 BPM | HEIGHT: 71 IN | OXYGEN SATURATION: 97 % | TEMPERATURE: 97 F | SYSTOLIC BLOOD PRESSURE: 130 MMHG | WEIGHT: 139.99 LBS | DIASTOLIC BLOOD PRESSURE: 60 MMHG | RESPIRATION RATE: 18 BRPM

## 2020-12-02 DIAGNOSIS — Z87.898 PERSONAL HISTORY OF OTHER SPECIFIED CONDITIONS: Chronic | ICD-10-CM

## 2020-12-02 DIAGNOSIS — R10.30 LOWER ABDOMINAL PAIN, UNSPECIFIED: ICD-10-CM

## 2020-12-02 DIAGNOSIS — Z20.828 CONTACT WITH AND (SUSPECTED) EXPOSURE TO OTHER VIRAL COMMUNICABLE DISEASES: ICD-10-CM

## 2020-12-02 DIAGNOSIS — K40.30 UNILATERAL INGUINAL HERNIA, WITH OBSTRUCTION, WITHOUT GANGRENE, NOT SPECIFIED AS RECURRENT: ICD-10-CM

## 2020-12-02 LAB
ALBUMIN SERPL ELPH-MCNC: 4.2 G/DL — SIGNIFICANT CHANGE UP (ref 3.3–5)
ALP SERPL-CCNC: SIGNIFICANT CHANGE UP (ref 40–120)
ALT FLD-CCNC: SIGNIFICANT CHANGE UP (ref 10–45)
ANION GAP SERPL CALC-SCNC: 12 MMOL/L — SIGNIFICANT CHANGE UP (ref 5–17)
APPEARANCE UR: CLEAR — SIGNIFICANT CHANGE UP
APTT BLD: 32.5 SEC — SIGNIFICANT CHANGE UP (ref 27.5–35.5)
AST SERPL-CCNC: SIGNIFICANT CHANGE UP (ref 10–40)
BASOPHILS # BLD AUTO: 0.03 K/UL — SIGNIFICANT CHANGE UP (ref 0–0.2)
BASOPHILS NFR BLD AUTO: 0.4 % — SIGNIFICANT CHANGE UP (ref 0–2)
BILIRUB SERPL-MCNC: 0.4 MG/DL — SIGNIFICANT CHANGE UP (ref 0.2–1.2)
BILIRUB UR-MCNC: NEGATIVE — SIGNIFICANT CHANGE UP
BLD GP AB SCN SERPL QL: NEGATIVE — SIGNIFICANT CHANGE UP
BUN SERPL-MCNC: 22 MG/DL — SIGNIFICANT CHANGE UP (ref 7–23)
CALCIUM SERPL-MCNC: 9.4 MG/DL — SIGNIFICANT CHANGE UP (ref 8.4–10.5)
CHLORIDE SERPL-SCNC: 99 MMOL/L — SIGNIFICANT CHANGE UP (ref 96–108)
CO2 SERPL-SCNC: 26 MMOL/L — SIGNIFICANT CHANGE UP (ref 22–31)
COLOR SPEC: YELLOW — SIGNIFICANT CHANGE UP
CREAT SERPL-MCNC: 1.01 MG/DL — SIGNIFICANT CHANGE UP (ref 0.5–1.3)
DIFF PNL FLD: NEGATIVE — SIGNIFICANT CHANGE UP
EOSINOPHIL # BLD AUTO: 0.12 K/UL — SIGNIFICANT CHANGE UP (ref 0–0.5)
EOSINOPHIL NFR BLD AUTO: 1.6 % — SIGNIFICANT CHANGE UP (ref 0–6)
GLUCOSE SERPL-MCNC: 82 MG/DL — SIGNIFICANT CHANGE UP (ref 70–99)
GLUCOSE UR QL: NEGATIVE — SIGNIFICANT CHANGE UP
HCT VFR BLD CALC: 47.7 % — SIGNIFICANT CHANGE UP (ref 39–50)
HGB BLD-MCNC: 15.4 G/DL — SIGNIFICANT CHANGE UP (ref 13–17)
IMM GRANULOCYTES NFR BLD AUTO: 0.5 % — SIGNIFICANT CHANGE UP (ref 0–1.5)
INR BLD: 1.02 — SIGNIFICANT CHANGE UP (ref 0.88–1.16)
KETONES UR-MCNC: NEGATIVE — SIGNIFICANT CHANGE UP
LACTATE SERPL-SCNC: 1.4 MMOL/L — SIGNIFICANT CHANGE UP (ref 0.5–2)
LEUKOCYTE ESTERASE UR-ACNC: NEGATIVE — SIGNIFICANT CHANGE UP
LYMPHOCYTES # BLD AUTO: 2.63 K/UL — SIGNIFICANT CHANGE UP (ref 1–3.3)
LYMPHOCYTES # BLD AUTO: 34.3 % — SIGNIFICANT CHANGE UP (ref 13–44)
MCHC RBC-ENTMCNC: 30.5 PG — SIGNIFICANT CHANGE UP (ref 27–34)
MCHC RBC-ENTMCNC: 32.3 GM/DL — SIGNIFICANT CHANGE UP (ref 32–36)
MCV RBC AUTO: 94.5 FL — SIGNIFICANT CHANGE UP (ref 80–100)
MONOCYTES # BLD AUTO: 0.42 K/UL — SIGNIFICANT CHANGE UP (ref 0–0.9)
MONOCYTES NFR BLD AUTO: 5.5 % — SIGNIFICANT CHANGE UP (ref 2–14)
NEUTROPHILS # BLD AUTO: 4.43 K/UL — SIGNIFICANT CHANGE UP (ref 1.8–7.4)
NEUTROPHILS NFR BLD AUTO: 57.7 % — SIGNIFICANT CHANGE UP (ref 43–77)
NITRITE UR-MCNC: NEGATIVE — SIGNIFICANT CHANGE UP
NRBC # BLD: 0 /100 WBCS — SIGNIFICANT CHANGE UP (ref 0–0)
PH UR: 6.5 — SIGNIFICANT CHANGE UP (ref 5–8)
PLATELET # BLD AUTO: 168 K/UL — SIGNIFICANT CHANGE UP (ref 150–400)
POTASSIUM SERPL-MCNC: SIGNIFICANT CHANGE UP (ref 3.5–5.3)
POTASSIUM SERPL-SCNC: SIGNIFICANT CHANGE UP (ref 3.5–5.3)
PROT SERPL-MCNC: 7.4 G/DL — SIGNIFICANT CHANGE UP (ref 6–8.3)
PROT UR-MCNC: NEGATIVE MG/DL — SIGNIFICANT CHANGE UP
PROTHROM AB SERPL-ACNC: 12.2 SEC — SIGNIFICANT CHANGE UP (ref 10.6–13.6)
RBC # BLD: 5.05 M/UL — SIGNIFICANT CHANGE UP (ref 4.2–5.8)
RBC # FLD: 13.4 % — SIGNIFICANT CHANGE UP (ref 10.3–14.5)
RH IG SCN BLD-IMP: POSITIVE — SIGNIFICANT CHANGE UP
SODIUM SERPL-SCNC: 137 MMOL/L — SIGNIFICANT CHANGE UP (ref 135–145)
SP GR SPEC: 1.02 — SIGNIFICANT CHANGE UP (ref 1–1.03)
UROBILINOGEN FLD QL: 1 E.U./DL — SIGNIFICANT CHANGE UP
WBC # BLD: 7.67 K/UL — SIGNIFICANT CHANGE UP (ref 3.8–10.5)
WBC # FLD AUTO: 7.67 K/UL — SIGNIFICANT CHANGE UP (ref 3.8–10.5)

## 2020-12-02 PROCEDURE — 99285 EMERGENCY DEPT VISIT HI MDM: CPT

## 2020-12-02 PROCEDURE — 81003 URINALYSIS AUTO W/O SCOPE: CPT

## 2020-12-02 PROCEDURE — 74176 CT ABD & PELVIS W/O CONTRAST: CPT

## 2020-12-02 PROCEDURE — 80053 COMPREHEN METABOLIC PANEL: CPT

## 2020-12-02 PROCEDURE — 96361 HYDRATE IV INFUSION ADD-ON: CPT

## 2020-12-02 PROCEDURE — 83605 ASSAY OF LACTIC ACID: CPT

## 2020-12-02 PROCEDURE — 74176 CT ABD & PELVIS W/O CONTRAST: CPT | Mod: 26

## 2020-12-02 PROCEDURE — U0003: CPT

## 2020-12-02 PROCEDURE — 85610 PROTHROMBIN TIME: CPT

## 2020-12-02 PROCEDURE — 86901 BLOOD TYPING SEROLOGIC RH(D): CPT

## 2020-12-02 PROCEDURE — 85730 THROMBOPLASTIN TIME PARTIAL: CPT

## 2020-12-02 PROCEDURE — 86850 RBC ANTIBODY SCREEN: CPT

## 2020-12-02 PROCEDURE — 36415 COLL VENOUS BLD VENIPUNCTURE: CPT

## 2020-12-02 PROCEDURE — 99284 EMERGENCY DEPT VISIT MOD MDM: CPT | Mod: 25

## 2020-12-02 PROCEDURE — 85025 COMPLETE CBC W/AUTO DIFF WBC: CPT

## 2020-12-02 PROCEDURE — 86900 BLOOD TYPING SEROLOGIC ABO: CPT

## 2020-12-02 PROCEDURE — 96374 THER/PROPH/DIAG INJ IV PUSH: CPT

## 2020-12-02 PROCEDURE — 96375 TX/PRO/DX INJ NEW DRUG ADDON: CPT

## 2020-12-02 RX ORDER — MORPHINE SULFATE 50 MG/1
2 CAPSULE, EXTENDED RELEASE ORAL ONCE
Refills: 0 | Status: DISCONTINUED | OUTPATIENT
Start: 2020-12-02 | End: 2020-12-02

## 2020-12-02 RX ORDER — SODIUM CHLORIDE 9 MG/ML
1000 INJECTION INTRAMUSCULAR; INTRAVENOUS; SUBCUTANEOUS ONCE
Refills: 0 | Status: COMPLETED | OUTPATIENT
Start: 2020-12-02 | End: 2020-12-02

## 2020-12-02 RX ADMIN — MORPHINE SULFATE 2 MILLIGRAM(S): 50 CAPSULE, EXTENDED RELEASE ORAL at 23:27

## 2020-12-02 RX ADMIN — MORPHINE SULFATE 2 MILLIGRAM(S): 50 CAPSULE, EXTENDED RELEASE ORAL at 22:57

## 2020-12-02 RX ADMIN — Medication 0.5 MILLIGRAM(S): at 21:51

## 2020-12-02 RX ADMIN — MORPHINE SULFATE 2 MILLIGRAM(S): 50 CAPSULE, EXTENDED RELEASE ORAL at 21:57

## 2020-12-02 RX ADMIN — MORPHINE SULFATE 2 MILLIGRAM(S): 50 CAPSULE, EXTENDED RELEASE ORAL at 22:58

## 2020-12-02 RX ADMIN — SODIUM CHLORIDE 1000 MILLILITER(S): 9 INJECTION INTRAMUSCULAR; INTRAVENOUS; SUBCUTANEOUS at 23:27

## 2020-12-02 RX ADMIN — MORPHINE SULFATE 2 MILLIGRAM(S): 50 CAPSULE, EXTENDED RELEASE ORAL at 22:23

## 2020-12-02 RX ADMIN — SODIUM CHLORIDE 1000 MILLILITER(S): 9 INJECTION INTRAMUSCULAR; INTRAVENOUS; SUBCUTANEOUS at 22:23

## 2020-12-02 NOTE — ED ADULT TRIAGE NOTE - CHIEF COMPLAINT QUOTE
Pt presents to ED w/ c/o L groin pain x 1 day. Hx of inguinal hernia, pt states he "popped it back in last night".

## 2020-12-02 NOTE — CONSULT NOTE ADULT - SUBJECTIVE AND OBJECTIVE BOX
This is a 64 y/o M with known history of Heroin abuse, PMH Left inguinal hernia, PSH brain tumor when he was 8 y/o, presents to the ED today saying that his left groin hernia popped out and never came back in after lunch, he is usually able to push it back but this time he could not. Associated with pain especially when he tries to push it, last BM was yesterday and passed flatus today, he endorsed constipation that is usually resolved with "mild of magnesia", denies, nausea and vomiting fever or chills, denies skin changes or discharge.  He has been diagnosed with this hernia for over a year now, was seen by surgery team on 2019 that time the hernia was reduced on its own after applying ICE packs and was told to  with surgery clinic but he did not show up.    In the ED vitals are stable, we tried to reduce the hernia after putting the patient in Trendelenburg position and giving him appropriate pain killers and applying ICE pack over the hernia, however that was not successful and the patient was not fully cooperative.    c-scope: patient is not sure    PMH: Left inguinal hernia, chronic heroin abuse  PSH: brain tumor when during childhood "unsure of the type"  FMH: Denies  SH: 1/2 PPD for 35 years, 6 beers a month, frequent heroin snorting last time was thrusday.   Allergies: NKDA                PAST MEDICAL & SURGICAL HISTORY:  Inguinal hernia  left    Substance abuse    H/O brain tumor  Resection, age 11        Vital Signs Last 24 Hrs  T(C): 36.7 (02 Dec 2020 23:27), Max: 36.7 (02 Dec 2020 23:27)  T(F): 98.1 (02 Dec 2020 23:27), Max: 98.1 (02 Dec 2020 23:27)  HR: 86 (02 Dec 2020 23:27) (66 - 86)  BP: 142/74 (02 Dec 2020 23:27) (130/60 - 142/74)  BP(mean): --  RR: 16 (02 Dec 2020 23:27) (16 - 18)  SpO2: 98% (02 Dec 2020 23:27) (96% - 98%)    I&O's Summary      Physical Exam:  General: NAD, resting comfortably  HEENT: NC/AT, EOMI, normal hearing, no oral lesions, no LAD, neck supple  Pulmonary: normal resp effort, CTA-B  Cardiovascular: NSR, no murmurs  Abdominal: soft, left irreducible inguinal hernia, tender on palpation, no rebound, non peritonitic, non distended  Extremities: WWP, normal strength, no clubbing/cyanosis/edema  Neuro: A/O x 3, CNs II-XII grossly intact, normal sensation, no focal deficits  Pulses: palpable distal pulses    Lines/drains/tubes:    LABS:                        15.4   7.67  )-----------( 168      ( 02 Dec 2020 21:31 )             47.7         139  |  103  |  20  ----------------------------<  117<H>  4.1   |  25  |  0.99    Ca    9.1      02 Dec 2020 23:27    TPro  6.6  /  Alb  3.9  /  TBili  0.3  /  DBili  x   /  AST  22  /  ALT  17  /  AlkPhos  81  12    PT/INR - ( 02 Dec 2020 21:31 )   PT: 12.2 sec;   INR: 1.02          PTT - ( 02 Dec 2020 21:31 )  PTT:32.5 sec  Urinalysis Basic - ( 02 Dec 2020 23:15 )    Color: Yellow / Appearance: Clear / S.020 / pH: x  Gluc: x / Ketone: NEGATIVE  / Bili: Negative / Urobili: 1.0 E.U./dL   Blood: x / Protein: NEGATIVE mg/dL / Nitrite: NEGATIVE   Leuk Esterase: NEGATIVE / RBC: x / WBC x   Sq Epi: x / Non Sq Epi: x / Bacteria: x      CAPILLARY BLOOD GLUCOSE        LIVER FUNCTIONS - ( 02 Dec 2020 23:27 )  Alb: 3.9 g/dL / Pro: 6.6 g/dL / ALK PHOS: 81 U/L / ALT: 17 U/L / AST: 22 U/L / GGT: x             < from: CT Abdomen and Pelvis No Cont (20 @ 23:15) >  Findings:    Suboptimal assessment of intra-abdominal organs due to noncontrast technique.    Lower chest: Mild subpleural reticular opacities.    Liver:  No change 1.2 cm cyst in the right hepatic lobe. Additional subcentimeter hypodensities, too small to characterize.    Gallbladder: No radiopaque stones gallbladder.    Spleen:  Normal.    Pancreas:  Mild pancreatic ductal dilatation, similar to prior. Otherwise unremarkable.    Adrenal glands:  Normal.    Kidneys: Punctate right renal calculus. Otherwise unremarkable.    Adenopathy:  No change to 5 x 3 cm calcified lesion in the left upper quadrant, probably lymph node. Additional smaller adjacent calcified retroperitoneal lymph nodes are also stable.    Ascites: None.    Gastrointestinal tract: Limited evaluation without enteric contrast. Slight interval enlargement the left inguinal hernia which now contains a loop of the distal left colon. There is narrowing of the entering and exiting segments of colon with large abundant colonic stool proximally and distally. There is no evidence of strangulation.    Vessels: Severe calcified plaque in the inferior abdominal aorta and bilateral common iliac arteries.    Pelvic organs: Small bilateral hydroceles. Otherwise unremarkable.    Soft tissues: Left inguinal hernia as above. No right inguinal hernia.    Bones: Stable degenerative changes at L5-S1. Bilateral spondylolysis of L5.    Impression:  1.  Since 2019, slightly enlarged left inguinal hernia which now contains a loop of distal left colon. There is narrowing of the entering and exiting the bowel without evidence of obstruction or strangulation.    2.  Moderate colonic stool burden may represent constipation.      < end of copied text >

## 2020-12-02 NOTE — ED ADULT NURSE REASSESSMENT NOTE - NS ED NURSE REASSESS COMMENT FT1
Patient a/oX 3, states abdominal pain improved s/p Toradol, no nausea/vomitting.  Vital signs stable.  NPO observed.  CT scan done.  Results and disposition pending. Patient a/ox 3, back from CT scan procedure.  Left groin pain improved s/p MOrphine 2mg X 2 doses IVP.  Vital signs stable.  NSS 1 L bolus completed, tolerated well.  NPO observed.   Additional labs sent to lab.  Seen by Surgery.  Results and disposition pending.

## 2020-12-02 NOTE — ED PROVIDER NOTE - OBJECTIVE STATEMENT
64 yo m with Hx of brain tumor excision in childhood, substance abuse  reports left inguinal lump for about a year, sometimes pops out but usually able to push it back in; today the hernia bulge recurred around noontime while riding subway.  has had increasing pain and unable to reduce it.  normally has BM every 2 days, last BM was yesterday.  not passing flatus today.  no nausea or vomiting.

## 2020-12-02 NOTE — ED ADULT NURSE REASSESSMENT NOTE - NS ED NURSE REASSESS COMMENT FT1
Patient signed AMA w/ PA Yair, refuses to stay after explanation by PA of risks of leaving AMA.  Vital signs stable, no pain complaint.  To be discharged AMA.

## 2020-12-02 NOTE — ED ADULT NURSE REASSESSMENT NOTE - NS ED NURSE REASSESS COMMENT FT1
Patient care and endorsement received from previous RN.  Patient c/o of left sided inguinal hernia, noted swelling and pain, attempts by NELLIE Mazariegos to reduce hernia.  Vital signs stable.  Patient w/  PIV #20 left hand , all labs sent .  Lactate and NSS 1 L bolus pending.  Received Ativan 0.5mg IVP, MOrphine 2 mg IVP.  Patient a/ox 3, lives in shelter.  Surgery consult pending.  NPO.

## 2020-12-02 NOTE — ED PROVIDER NOTE - ATTENDING CONTRIBUTION TO CARE
here w/ incarcerated hernia. Both NELLIE Mazariegos and I attempted reduction without improvement. Pt uncomfortable on exam w/ larger inguinal hernia, tender, irreducible. Pt wanted to leave AMA. initially I was able to convince him to stay for surgical consult, labs, and further eval, but shortly after CT done when planning to attempt reduction again (and with additional pain meds) but pt at this piont again said he wished to leave AMA. NELLIE Mazariegos, surgical team, and I all seperately had conversations with him about the risks of leaving AMA, specifically  high risk of needing an ostomy and also possible mortality from sepsis if he doesn't stay to address his hernia. He does have capacity and it was made clear that he could return any time and we would address it right away.

## 2020-12-02 NOTE — ED PROVIDER NOTE - CLINICAL SUMMARY MEDICAL DECISION MAKING FREE TEXT BOX
Incarcerated left inguinal hernia: unable to reduce despite multiple attempts (ice, trendelenberg, digital manipulation) Incarcerated left inguinal hernia: unable to reduce despite multiple attempts (ice, trendelenberg, digital manipulation).  Patient's pain and anxiety was treated.  He was seen by surgery.  He insisted on signing out AMA, fully lucid with capacity to make his own decisions.

## 2020-12-02 NOTE — ED PROVIDER NOTE - PROGRESS NOTE DETAILS
Several attempts to reduce left inguinal hernia (digital manipulation in trendelenberg with ice packs, rec'd ativan) unsuccessful.  Patient getting mildly agitated and wants to leave.  He reluctantly agreed to allow further attempts and surgical eval if we give some pain medication.  Morphine ordered, surgery consult called.  Patient being moved to [facility] ED. Several attempts to reduce left inguinal hernia (digital manipulation in trendelenberg with ice packs, rec'd ativan) unsuccessful.  Patient getting mildly agitated and wants to leave.  I explained risks including potential need for surgery/colostomy, possible sepsis and death.  He reluctantly agreed to allow further attempts and surgical eval if we give some pain medication.  Morphine ordered, surgery consult called.  Patient being moved to [facility] ED. Surgery made several attempts to reduce hernia unsuccessfully, and I again have attempted unsuccessfully.  The patient states he is going to leave against medical advice.  Dr Sanz and I again explained risks of leaving AMA, and he was able to explain the risks back to me, specifically stating that he knows he could die from this.  He says he wants to go home to see family members to discuss; I explained the time sensitive nature of his emergency and that he may not have enough time to speak with them before he becomes critically ill; I offered to call his family but he says he doesn't have any phone numbers, that he would go to the [facility] to see them.  He says he is willing to take that chance.  He is alert and oriented x 3, seems entirely lucid and I do not have a reason to doubt his capacity to make his own medical decisions. Surgery made several attempts to reduce hernia unsuccessfully, and I again have attempted unsuccessfully.  The patient states he is going to leave against medical advice.  Dr Sanz and I again explained risks of leaving AMA, and he was able to explain the risks back to me, specifically stating that he knows he could die from this.  He says he wants to go home to see family members to discuss; I explained the time sensitive nature of his emergency and that he may not have enough time to speak with them before he becomes critically ill; I offered to call his family but he says he doesn't have any phone numbers, that he would go to the [facility] to see them.  He says he is willing to take that chance.  He is alert and oriented x 3, seems entirely lucid and I do not have a reason to doubt his capacity to make his own medical decisions.  He was told that he could return to ER at any time if he changes his mind.  Will also have ED Referral Coordinator reach out to help arrange urgent surgery follow up.

## 2020-12-02 NOTE — ED PROVIDER NOTE - NSFOLLOWUPINSTRUCTIONS_ED_ALL_ED_FT
You are signing out against medical advice.  You may return to the ER or go to any ER at any time if you change your mind.      Return to the Emergency Department if you have any new or worsening symptoms, or if you have any concerns.  ==================================    Inguinal Hernia    WHAT YOU NEED TO KNOW:    An inguinal hernia happens when organs or abdominal tissue push through a weak spot in the abdominal wall. The abdominal wall is made of fat and muscle. It holds the intestines in place. The hernia may contain fluid, tissue from the abdomen, or part of an organ (such as an intestine).    Inguinal Hernia         DISCHARGE INSTRUCTIONS:    Return to the emergency department if:   •You have severe abdominal pain with nausea and vomiting.       •Your abdomen is larger than usual.       •Your hernia gets bigger or is purple or blue.       •You see blood in your bowel movements.      •You feel weak, dizzy, or faint.       Contact your healthcare provider if:   •You have a fever.      •You have questions or concerns about your condition or care.      Medicine: You may need the following:   •NSAIDs, such as ibuprofen, help decrease swelling, pain, and fever. NSAIDs can cause stomach bleeding or kidney problems in certain people. If you take blood thinner medicine, always ask your healthcare provider if NSAIDs are safe for you. Always read the medicine label and follow directions.      •Take your medicine as directed. Contact your healthcare provider if you think your medicine is not helping or if you have side effects. Tell him or her if you are allergic to any medicine. Keep a list of the medicines, vitamins, and herbs you take. Include the amounts, and when and why you take them. Bring the list or the pill bottles to follow-up visits. Carry your medicine list with you in case of an emergency.      Follow up with your healthcare provider as directed: Write down your questions so you remember to ask them during your visits.    Manage your symptoms and prevent another hernia:   •Do not lift anything heavy. Heavy lifting can make your hernia worse or cause another hernia. Ask your healthcare provider how much is safe for you to lift.       •Drink liquids as directed. Liquids may prevent constipation and straining during a bowel movement. Ask how much liquid to drink each day and which liquids are best for you.       •Eat foods high in fiber. Fiber may prevent constipation and straining during a bowel movement. Foods that contain fiber include fruits, vegetables, beans, lentils, and whole grains.       •Maintain a healthy weight. If you are overweight, weight loss may prevent your hernia from getting worse. It may also prevent another hernia. Talk to your healthcare provider about exercise and how to lose weight safely if you are overweight.       •Do not smoke. Nicotine and other chemicals in cigarettes and cigars can weaken the abdominal wall. This may increase your risk for another hernia. Ask your healthcare provider for information if you currently smoke and need help to quit. E-cigarettes or smokeless tobacco still contain nicotine. Talk to your healthcare provider before you use these products.

## 2020-12-02 NOTE — ED PROVIDER NOTE - REFUSAL OF SERVICE, MDM
Patient is alert, oriented, and displays adequate decision making capacity.  I discussed risks of leaving, up to and including death.    Patient understands the risks and is able to reiterate this to me.

## 2020-12-02 NOTE — CONSULT NOTE ADULT - ASSESSMENT
This is a 66 y/o M, heroin abuse history of L inguinal hernia.  Presents with incarcerated LIH today, denies, n/v/f/c    Vitals are stable, irreducible LIh on exam    CT was done and showed incarcerated hernia containing bowel, but no obstruction or strangulation    ED physician, myself and chief resident tried to reduce the hernia after putting the patient in Trendelenburg position, applying ICE packs, morphine and lorazepam, we were not able to reduce the hernia.    Patient was adamant he wanted to sign AMA, although explained at length by ED physician and surgery team, that can carry a high risk of needing an ostomy and also possible mortality from sepsis if he doesn't stay to address his hernia    We also made sure he had the capacity to make his decisions, he insisted on leaving AMA    Discussed with attending and chief resident This is a 66 y/o M, heroin abuse history of L inguinal hernia.  Presents with incarcerated LIH today, denies, n/v/f/c    Vitals are stable, irreducible LIh on exam    CT was done and showed incarcerated hernia containing bowel, but no obstruction or strangulation    ED physician, myself and chief resident tried to reduce the hernia after putting the patient in Trendelenburg position, applying ICE packs, morphine and lorazepam, we were not able to reduce the hernia.    Patient was adamant he wanted to sign AMA, although explained at length by ED physician and surgery team, that can carry a high risk of needing an ostomy and also possible mortality from sepsis if he doesn't stay to address his hernia    We also made sure he had the capacity to make his decisions, he insisted on leaving AMA    Discussed with attending and chief resident            Chief resident note - Dr. Batista  patient seen and examined at bedside with consult resident   multiple attempts made to reduce hernia with Trendelenburg positioning and placement of ice, however patient refused further attempts   patient then insisted to leave AMA   risks of AMA explained at length with patient who understood risks of leaving AMA   instructed the importance of returning if abdominal pain worsens / patient experiences symptoms of obstruction or incarceration

## 2020-12-02 NOTE — ED PROVIDER NOTE - PATIENT PORTAL LINK FT
You can access the FollowMyHealth Patient Portal offered by [facility] by registering at the following website: [email or web address]. By joining [facility]’s FollowMyHealth portal, you will also be able to view your health information using other applications (apps) compatible with our system.

## 2020-12-02 NOTE — ED PROVIDER NOTE - PHYSICAL EXAMINATION
CONSTITUTIONAL: WD,WN. NAD.    SKIN: Normal color and turgor. No rash.    HEAD: NC/AT.  EYES: Conjunctiva clear. EOMI. PERRL.    ENT: Airway patent, OP without erythema, tonsillar swelling or exudate; uvula midline without swelling. Nasal mucosa clear, no rhinorrhea.   RESPIRATORY:  Breathing non-labored. No retractions or accessory muscle use.  Lungs CTA bilat.  CARDIOVASCULAR:  RRR, S1S2. No M/R/G.      GI:  Large tender left inguinal hernia, no overlying erythema.    MSK: Neck supple with painless ROM.  No lower extremity edema or calf tenderness.  No joint swelling or ROM limitation.  NEURO: Alert and oriented; CN II-XII grossly intact. Speech clear. 5/5 strength in all extremities.  Good balance. Steady gait.

## 2020-12-03 LAB
ALBUMIN SERPL ELPH-MCNC: 3.9 G/DL — SIGNIFICANT CHANGE UP (ref 3.3–5)
ALP SERPL-CCNC: 81 U/L — SIGNIFICANT CHANGE UP (ref 40–120)
ALT FLD-CCNC: 17 U/L — SIGNIFICANT CHANGE UP (ref 10–45)
ANION GAP SERPL CALC-SCNC: 11 MMOL/L — SIGNIFICANT CHANGE UP (ref 5–17)
AST SERPL-CCNC: 22 U/L — SIGNIFICANT CHANGE UP (ref 10–40)
BILIRUB SERPL-MCNC: 0.3 MG/DL — SIGNIFICANT CHANGE UP (ref 0.2–1.2)
BUN SERPL-MCNC: 20 MG/DL — SIGNIFICANT CHANGE UP (ref 7–23)
CALCIUM SERPL-MCNC: 9.1 MG/DL — SIGNIFICANT CHANGE UP (ref 8.4–10.5)
CHLORIDE SERPL-SCNC: 103 MMOL/L — SIGNIFICANT CHANGE UP (ref 96–108)
CO2 SERPL-SCNC: 25 MMOL/L — SIGNIFICANT CHANGE UP (ref 22–31)
CREAT SERPL-MCNC: 0.99 MG/DL — SIGNIFICANT CHANGE UP (ref 0.5–1.3)
GLUCOSE SERPL-MCNC: 117 MG/DL — HIGH (ref 70–99)
POTASSIUM SERPL-MCNC: 4.1 MMOL/L — SIGNIFICANT CHANGE UP (ref 3.5–5.3)
POTASSIUM SERPL-SCNC: 4.1 MMOL/L — SIGNIFICANT CHANGE UP (ref 3.5–5.3)
PROT SERPL-MCNC: 6.6 G/DL — SIGNIFICANT CHANGE UP (ref 6–8.3)
SARS-COV-2 RNA SPEC QL NAA+PROBE: SIGNIFICANT CHANGE UP
SODIUM SERPL-SCNC: 139 MMOL/L — SIGNIFICANT CHANGE UP (ref 135–145)

## 2021-02-28 ENCOUNTER — EMERGENCY (EMERGENCY)
Facility: HOSPITAL | Age: 66
LOS: 1 days | Discharge: HOME | End: 2021-02-28
Attending: EMERGENCY MEDICINE | Admitting: EMERGENCY MEDICINE
Payer: MEDICARE

## 2021-02-28 VITALS
RESPIRATION RATE: 16 BRPM | TEMPERATURE: 98 F | OXYGEN SATURATION: 98 % | SYSTOLIC BLOOD PRESSURE: 129 MMHG | HEART RATE: 76 BPM | DIASTOLIC BLOOD PRESSURE: 84 MMHG

## 2021-02-28 DIAGNOSIS — M79.671 PAIN IN RIGHT FOOT: ICD-10-CM

## 2021-02-28 RX ORDER — ACETAMINOPHEN 500 MG
650 TABLET ORAL ONCE
Refills: 0 | Status: COMPLETED | OUTPATIENT
Start: 2021-02-28 | End: 2021-02-28

## 2021-02-28 RX ADMIN — Medication 650 MILLIGRAM(S): at 06:13

## 2021-02-28 NOTE — ED PROVIDER NOTE - PATIENT PORTAL LINK FT
You can access the FollowMyHealth Patient Portal offered by [facility] by registering at the following website: [email or web address]. By joining [employer]’s FollowMyHealth portal, you will also be able to view your health information using other applications (apps) compatible with our system.

## 2021-02-28 NOTE — ED PROVIDER NOTE - INTERNATIONAL TRAVEL
Labs drawn via port. Pt tolerated well. Discharged stable to lobby - await appt with onc. Gait steady.
No

## 2021-02-28 NOTE — ED PROVIDER NOTE - OBJECTIVE STATEMENT
Pt is a 64yo M who reports injury to R foot 2 days ago.  Walking on it since.  Reports pain to midfoot with mild edema.  No skin changes.  No other complaints.

## 2021-02-28 NOTE — ED ADULT TRIAGE NOTE - CHIEF COMPLAINT QUOTE
Pt brought in by EMS for complaint of right foot pain X 4 days stating he fell a few days ago. States the whole bottom of his foot hurts and his big toe is swollen.

## 2021-02-28 NOTE — ED PROVIDER NOTE - CONSTITUTIONAL NEGATIVE STATEMENT, MLM
Progress Note - Sleep Center   [place] HIZ:[id number]/87/[birth year]20 MRN: [id number]      Reason for Visit:  59 y  o female here for annual follow-up    Assessment:  Doing well on current therapy of APAP 9 to 11 cm for severe STU (AHI = 30 9)  Plan:  Changed from modafinil to Sun[place] 75 mg up to 150 mg in the morning  Modafinil is wearing off too quickly and she feels drowsy in the afternoon  Follow up:  Four months    History of Present Illness:  History of STU on PAP therapy  Fully compliant and deriving benefit  She is frequently taking off her APAP mask at night    I encouraged her to continue in her efforts    Review of Systems      Genitourinary need to urinate more than twice a night   Cardiology ankle/leg swelling   Gastrointestinal frequent heartburn/acid reflux and abdominal pain or cramping that disturb sleep    Neurology frequent headaches, need to move extremities, muscle weakness, forgetfulness, poor concentration or confusion, , difficulty with memory and balance problems   Constitutional claustrophobia and fatigue   Integumentary rash or dry skin and itching   Psychiatry anxiety, depression and mood change   Musculoskeletal joint pain, muscle aches, back pain, legs twitching/jerking and leg cramps   Pulmonary wheezing and snoring   ENT none   Endocrine excessive thirst and frequent urination   Hematological none         I have reviewed and updated the review of systems as necessary      Historical Information    Past Medical History:   Past Medical History:   Diagnosis Date    Anxiety     Asthma     controlled    Back complaints     [facility]     nose    Cellulitis of left lower leg     Chronic bilateral thoracic back pain     Chronic myofascial pain     Chronic pain of both lower extremities     Chronic pain of left knee     Chronic pain syndrome     CPAP (continuous positive airway pressure) dependence     Depression     Diabetes mellitus ([facility] )     Disease of thyroid gland     Gait disorder     Gastroparesis     GERD (gastroesophageal reflux disease)     History of angina     History of transfusion     Many years ago    Hyperlipidemia     Hypertension     Insulin pump in place     Kidney disease     Polyneuropathy associated with underlying disease ([facility] )     PONV (postoperative nausea and vomiting)     S/P insertion of spinal cord stimulator 2018    Sarcoidosis     Sleep apnea     TIA (transient ischemic attack)          Past Surgical History:   Past Surgical History:   Procedure Laterality Date    BREAST SURGERY      BREAST SURGERY      reduction    CARDIAC PACEMAKER PLACEMENT       SECTION      HERNIA REPAIR      HYSTERECTOMY      JOINT REPLACEMENT Left     NECK SURGERY      MS SURG IMPLNT Orlean Reas Left 2018    Procedure:  Insertion of thoracic spinal cord stimulator electrode via laminotomy and placement of left buttock IPG;  Surgeon: Titus Day MD;  Location: [facility];  Service: Neurosurgery    REPLACEMENT TOTAL KNEE      ROTATOR CUFF REPAIR      SPINAL STIMULATOR PLACEMENT Left 10/3/2018    Procedure: BUTTOCK RE-OPENING INCISION FOR REPOSITIONING OF IMPLANTABLE PULSE GENERATOR;  Surgeon: Titus Day MD;  Location: [facility];  Service: Neurosurgery    TONSILLECTOMY      WISDOM TOOTH EXTRACTION         Social History:   Social History     Socioeconomic History    Marital status: /Civil Union     Spouse name: None    Number of children: None    Years of education: None    Highest education level: None   Occupational History    None   Social Needs    Financial resource strain: None    Food insecurity:     Worry: None     Inability: None    Transportation needs:     Medical: None     Non-medical: None   Tobacco Use    Smoking status: Never Smoker    Smokeless tobacco: Never Used   Substance and Sexual Activity    Alcohol use: No    Drug use: No    Sexual activity: Yes   Lifestyle    Physical activity:     Days per week: None     Minutes per session: None    Stress: None   Relationships    Social connections:     Talks on phone: None     Gets together: None     Attends Yarsani service: None     Active member of club or organization: None     Attends meetings of clubs or organizations: None     Relationship status: None    Intimate partner violence:     Fear of current or ex partner: None     Emotionally abused: None     Physically abused: None     Forced sexual activity: None   Other Topics Concern    None   Social History Narrative    None       Family History:   Family History   Problem Relation Age of Onset    Diabetes Family     Heart disease Family     Hypertension Family     Neuropathy Family     No Known Problems Mother     Heart disease Father     Diabetes Father     No Known Problems Maternal Grandmother     No Known Problems Maternal Grandfather     No Known Problems Paternal Grandmother     No Known Problems Paternal Grandfather        Medications/Allergies:      Current Outpatient Medications:     amitriptyline (ELAVIL) 50 mg tablet, Take one tablet p o  at bedtime for two weeks    If insomnia persists, increase to two tablets at bedtime, Disp: 60 tablet, Rfl: 2    atorvastatin (LIPITOR) 80 mg tablet, Take 80 mg by mouth daily at bedtime  , Disp: , Rfl:     cholecalciferol (VITAMIN D3) 1,000 units tablet, Take 2,000 Units by mouth daily with lunch  , Disp: , Rfl: 5    clopidogrel (PLAVIX) 75 mg tablet, Take 1 tablet (75 mg total) by mouth daily (Patient taking differently: Take 75 mg by mouth daily at bedtime  ), Disp: 30 tablet, Rfl: 4    CRANBERRY PO, Take 1 capsule by mouth daily with lunch  , Disp: , Rfl:     Cyanocobalamin (VITAMIN B-12 PO), Take 1 capsule by mouth daily with lunch  , Disp: , Rfl:     Docusate Sodium 100 MG capsule, Take 100 mg by mouth daily with lunch  , Disp: , Rfl:     DULoxetine (CYMBALTA) 60 mg delayed release capsule, Take 2 PO QD, Disp: 180 capsule, Rfl: 0    famotidine (PEPCID) 20 mg tablet, , Disp: , Rfl:     Fesoterodine Fumarate ER 4 MG TB24, Take 1 tablet (4 mg total) by mouth daily, Disp: 30 each, Rfl: 6    fluticasone-salmeterol (ADVAIR) 100-50 mcg/dose inhaler, Inhale 2 puffs, Disp: , Rfl:     furosemide (LASIX) 20 mg tablet, Take 20 mg by mouth daily in the early morning  , Disp: , Rfl:     glucagon 1 MG injection, Glucagon Emergency Kit (human-recomb) 1 mg solution for injection, Disp: , Rfl:     glucose blood test strip, Testing six times daily  E11 65 on insulin pump, Disp: , Rfl:     hydrALAZINE (APRESOLINE) 10 mg tablet, , Disp: , Rfl:     levothyroxine 75 mcg tablet, Take 75 mcg by mouth daily in the early morning  , Disp: , Rfl:     losartan (COZAAR) 100 MG tablet, Take 100 mg by mouth daily in the early morning  , Disp: , Rfl:     magnesium oxide (MAG-OX) 400 mg tablet, Take 400 mg by mouth, Disp: , Rfl:     modafinil (PROVIGIL) 200 MG tablet, Take 1/2 to 1 tablet every morning as needed  , Disp: 30 tablet, Rfl: 0    Naloxone HCl (NARCAN) 4 MG/0 1ML LIQD, Sprays into 1 nostril for suspected opioid overdose  May repeat , Disp: 2 each, Rfl: 0    NOVOLOG 100 UNIT/ML injection, INJECT 120 UNITS DAILY VIA INSULIN PUMP E 11 65, Disp: , Rfl: 3    ondansetron (ZOFRAN) 8 mg tablet, Take 8 mg by mouth every 8 (eight) hours as needed  , Disp: , Rfl:     oxyCODONE-acetaminophen (Percocet) 5-325 mg per tablet, Take 1 PO BID PRN for pain  DO NOT FILL UNTIL: 4/13/20, Disp: 45 tablet, Rfl: 0    pantoprazole (PROTONIX) 40 mg tablet, Take 40 mg by mouth 2 (two) times a day  , Disp: , Rfl:     pramipexole (MIRAPEX) 1 mg tablet, Take 1 mg by mouth 2 (two) times a day  , Disp: , Rfl:     SYMBICORT 80-4 5 MCG/ACT inhaler, Inhale 2 puffs 2 (two) times a day  , Disp: , Rfl:     tiZANidine (ZANAFLEX) 4 mg tablet, Take 1 PO TID during the day and 1-2 PO HS for spasms  , Disp: 450 tablet, Rfl: 0    VENTOLIN  (90 Base) MCG/ACT inhaler, 2 puffs every 4 (four) hours as needed  , Disp: , Rfl:           Objective      Vital Signs:   Vitals:    02/25/20 1500   BP: 134/68   Pulse: 88     [place] Sleepiness Scale: Total score: 21        Physical Exam:    General: Alert, appropriate, cooperative, overweight    Head: NC/AT    Skin: Warm, dry    Neuro: No motor abnormalities, cranial nerves appear intact    Extremity: No clubbing, cyanosis      DME Provider: [employer]        Counseling / Coordination of Care   I have spent 15 minutes with the patient today in which greater than 50% of this time was spent in counseling/coordination of care regarding: equipment and compliance  Board Certified Sleep Specialist    Portions of the record may have been created with voice recognition software  Occasional wrong word or "sound a like" substitutions may have occurred due to the inherent limitations of voice recognition software  Read the chart carefully and recognize, using context, where substitutions have occurred  no fever and no chills.

## 2021-02-28 NOTE — ED PROVIDER NOTE - NSFOLLOWUPINSTRUCTIONS_ED_ALL_ED_FT
-PLEASE FOLLOW-UP WITH YOUR PRIMARY CARE DOCTOR IN 1-2 DAYS.  BRING ALL PAPERWORK FROM TODAY'S VISIT TO YOUR FOLLOW-UP VISIT.    -TAKE OVER THE COUNTER TYLENOL 650MG BY MOUTH EVERY 4-6 HOURS AS NEEDED FOR PAIN.  DO NOT MIX WITH ALCOHOL OR OTHER PRESCRIPTION MEDICATIONS THAT ALREADY CONTAIN TYLENOL OR ACETAMINOPHEN.   -PLEASE RETURN TO THE ER IMMEDIATELY OR CALL 911 FOR ANY HIGH FEVER, TROUBLE BREATHING, VOMITING, SEVERE PAIN, OR ANY OTHER CONCERNS.

## 2021-06-12 ENCOUNTER — EMERGENCY (EMERGENCY)
Facility: HOSPITAL | Age: 66
LOS: 1 days | Discharge: HOME | End: 2021-06-12
Attending: EMERGENCY MEDICINE | Admitting: EMERGENCY MEDICINE
Payer: COMMERCIAL

## 2021-06-12 VITALS
HEART RATE: 91 BPM | RESPIRATION RATE: 18 BRPM | SYSTOLIC BLOOD PRESSURE: 125 MMHG | OXYGEN SATURATION: 98 % | TEMPERATURE: 98 F | DIASTOLIC BLOOD PRESSURE: 83 MMHG

## 2021-06-12 VITALS
RESPIRATION RATE: 16 BRPM | OXYGEN SATURATION: 97 % | TEMPERATURE: 98 F | SYSTOLIC BLOOD PRESSURE: 126 MMHG | HEIGHT: 71 IN | DIASTOLIC BLOOD PRESSURE: 75 MMHG | HEART RATE: 84 BPM | WEIGHT: 145.06 LBS

## 2021-06-12 DIAGNOSIS — R41.82 ALTERED MENTAL STATUS, UNSPECIFIED: ICD-10-CM

## 2021-06-12 DIAGNOSIS — Z87.710 PERSONAL HISTORY OF (CORRECTED) HYPOSPADIAS: ICD-10-CM

## 2021-06-12 DIAGNOSIS — Z87.898 PERSONAL HISTORY OF OTHER SPECIFIED CONDITIONS: Chronic | ICD-10-CM

## 2021-06-12 DIAGNOSIS — T40.1X1A POISONING BY HEROIN, ACCIDENTAL (UNINTENTIONAL), INITIAL ENCOUNTER: ICD-10-CM

## 2021-06-12 PROCEDURE — 82962 GLUCOSE BLOOD TEST: CPT

## 2021-06-12 PROCEDURE — 99284 EMERGENCY DEPT VISIT MOD MDM: CPT

## 2021-06-12 PROCEDURE — 99285 EMERGENCY DEPT VISIT HI MDM: CPT

## 2021-06-12 NOTE — ED PROVIDER NOTE - PHYSICAL EXAMINATION
VITAL SIGNS: I have reviewed nursing notes and confirm.  CONSTITUTIONAL: Well-developed; well-nourished; in no acute distress.   SKIN:  Warm and dry, no acute rash.   HEAD:  normocephalic, atraumatic.  EYES: PERRL.  EOM intact; conjunctiva and sclera clear.  ENT: No nasal discharge; airway clear.   NECK: Supple; non tender.  CARD: S1, S2 normal; no murmurs, gallops, or rubs. Regular rate and rhythm.   RESP:  Clear to auscultation b/l, no wheezes, rales or rhonchi.  ABD: Normal bowel sounds; soft; non-distended; non-tender; no guarding/rebound.  EXT: Ambulating without difficulty.  Normal ROM. No clubbing, cyanosis or edema. 2+ pulses to b/l ue/le.  NEURO: Alert, oriented, grossly unremarkable.  5/5 strength x 4 extremities against gravity and external force.  No drift x 4 extremities.  Sensation intact and symmetric x 4 extremities.  No facial asymmetry.    PSYCH: Cooperative, mood and affect appropriate.

## 2021-06-12 NOTE — ED PROVIDER NOTE - CLINICAL SUMMARY MEDICAL DECISION MAKING FREE TEXT BOX
Patient presents to ED s/p found w AMS, given narcan 2mg intranasal and had prompt return to baseline mentation.  Patient presents to ED awake, alert and requesting discharge.  Patient is agreeable to accucheck and vitals, however will not allow for monitoring or any other labs, ekg, etc.  Patient is ambulatory in ED and requesting food, which is provided.  Patient educated re need to continue with suboxone regimen and to stop heroin use.  Patient is advised to follow up with their PCP in 1-2 days without fail.  Patient instructed to return to ED immediately should their symptoms worsen or if there is any concern prior to the recommended PCP follow up.  Patient is aware of plan and verbalizes their understanding.  Will discharge home at this time.

## 2021-06-12 NOTE — ED ADULT NURSE NOTE - NSIMPLEMENTINTERV_GEN_ALL_ED
hospitalist Implemented All Universal Safety Interventions:  [place] to call system. Call bell, personal items and telephone within reach. Instruct patient to call for assistance. Room bathroom lighting operational. Non-slip footwear when patient is off stretcher. Physically safe environment: no spills, clutter or unnecessary equipment. Stretcher in lowest position, wheels locked, appropriate side rails in place.

## 2021-06-12 NOTE — ED ADULT TRIAGE NOTE - CHIEF COMPLAINT QUOTE
Pt BIBA, endorses doing several bags of heroin, RR 4-6 per EMS, given 2mg IN narcan. pt AAOX3, RR 18, speaking in full clear sentences. Pt requesting to leave, denies any pain.

## 2021-06-12 NOTE — ED PROVIDER NOTE - OBJECTIVE STATEMENT
65 year old male with history of heroin use presents to ED via EMS transport following overdose.  Patient reportedly given 2mg intranasal Narcan and presents to ED awake, alert and requesting discharge.  Patient will not allow for further evaluation stating he feels well and would like to go home.  He denies associated headache, fever, chills, chest pain, shortness of breath, abdominal pain, nausea, emesis, changes to bowel movements, peripheral edema, rashes, recent travel, known sick contacts or any additional acute complaints or concerns at this time.

## 2021-06-12 NOTE — ED PROVIDER NOTE - NSFOLLOWUPINSTRUCTIONS_ED_ALL_ED_FT
Please follow up with your primary physician in 1-2 days for re evaluation.  Please return to ER immediately should your symptoms worsen or if you have any concern prior to this recommended follow up.        Opioid Overdose      Opioids are drugs that are often used to treat pain. Opioids include illegal drugs, such as heroin, as well as prescription pain medicines, such as codeine, morphine, hydrocodone, oxycodone, and fentanyl. An opioid overdose happens when you take too much of an opioid. An overdose may be intentional or accidental and can happen with any type of opioid.    The effects of an overdose can be mild, dangerous, or even deadly. Opioid overdose is a medical emergency.      What are the causes?  This condition may be caused by:  •Taking too much of an opioid on purpose.      •Taking too much of an opioid by accident.      •Using two or more substances that contain opioids at the same time.      •Taking an opioid with a substance that affects your heart, breathing, or blood pressure. These include alcohol, tranquilizers, sleeping pills, illegal drugs, and some over-the-counter medicines.    This condition may also happen due to an error made by:  •A health care provider who prescribes a medicine.      •The pharmacist who fills the prescription order.        What increases the risk?  This condition is more likely in:  •Children. They may be attracted to colorful pills. Because of a child's small size, even a small amount of a drug can be dangerous.      •Older people. They may be taking many different drugs. Older people may have difficulty reading labels or remembering when they last took their medicine. They may also be more sensitive to the effects of opioids.      •People with chronic medical conditions, especially heart, liver, kidney, or neurological diseases.      •People who take an opioid for a long period of time.    •People who use:  •Illegal drugs. IV heroin is especially dangerous.      •Other substances, including alcohol, while using an opioid.      •People who have:  •A history of drug or alcohol abuse.      •Certain mental health conditions.      •A history of previous drug overdoses.        •People who take opioids that are not prescribed for them.        What are the signs or symptoms?  Symptoms of this condition depend on the type of opioid and the amount that was taken. Common symptoms include:  •Sleepiness or difficulty waking from sleep.      •Decrease in attention.      •Confusion.      •Slurred speech.      •Slowed breathing and a slow pulse (bradycardia).      •Nausea and vomiting.      •Abnormally small pupils.    Signs and symptoms that require emergency treatment include:  •Cold, clammy, and pale skin.      •Blue lips and fingernails.      •Vomiting.      •Gurgling sounds in the throat.      •A pulse that is very slow or difficult to detect.      •Breathing that is very irregular, slow, noisy, or difficult to detect.      •Limp body.      •Inability to respond to speech or be awakened from sleep (stupor).      •Seizures.        How is this diagnosed?  This condition is diagnosed based on your symptoms and medical history. It is important to tell your health care provider:  •About all of the opioids that you took.      •When you took the opioids.      •Whether you were drinking alcohol or using marijuana, cocaine, or other drugs.    Your health care provider will do a physical exam. This exam may include:  •Checking and monitoring your heart rate and rhythm, breathing rate, temperature, and blood pressure (vital signs).      •Measuring oxygen levels in your blood.      •Checking for abnormally small pupils.      You may also have blood tests or urine tests. You may have X-rays if you are having severe breathing problems.      How is this treated?  This condition requires immediate medical treatment and hospitalization. Treatment is given in the hospital intensive care (ICU) setting. Supporting your vital signs and your breathing is the first step in treating an opioid overdose. Treatment may also include:  •Giving salts and minerals (electrolytes) along with fluids through an IV.      •Inserting a breathing tube (endotracheal tube) in your airway to help you breathe if you cannot breathe on your own or you are in danger of not being able to breathe on your own.      •Giving oxygen through a small tube under your nose.      •Passing a tube through your nose and into your stomach (nasogastric tube, or NG tube) to empty your stomach.    •Giving medicines that:  •Increase your blood pressure.      •Relieve nausea and vomiting.      •Relieve abdominal pain and cramping.      •Reverse the effects of the opioid (naloxone).        •Monitoring your heart and oxygen levels.      •Ongoing counseling and mental health support if you intentionally overdosed or used an illegal drug.        Follow these instructions at home:     Medicines     •Take over-the-counter and prescription medicines only as told by your health care provider.      •Always ask your health care provider about possible side effects and interactions of any new medicine that you start taking.      •Keep a list of all the medicines that you take, including over-the-counter medicines. Bring this list with you to all your medical visits.      General instructions     •Drink enough fluid to keep your urine pale yellow.      •Keep all follow-up visits as told by your health care provider. This is important.        How is this prevented?    •Read the drug inserts that come with your opioid pain medicines.      •Take medicines only as told by your health care provider. Do not take more medicine than you are told. Do not take medicines more frequently than you are told.      • Do not drink alcohol or take sedatives when taking opioids.      • Do not use illegal or recreational drugs, including cocaine, ecstasy, and marijuana.      • Do not take opioid medicines that are not prescribed for you.      •Store all medicines in safety containers that are out of the reach of children.    •Get help if you are struggling with:  •Alcohol or drug use.      •Depression or another mental health problem.      •Thoughts of hurting yourself or another person.        •Keep the phone number of your local poison control center near your phone or in your mobile phone. In the [place] the hotline of the National Poison Control Center is (958) 969-6494.      •If you were prescribed naloxone, make sure you understand how to take it.        Contact a health care provider if you:    •Need help understanding how to take your pain medicines.      •Feel your medicines are too strong.      •Are concerned that your pain medicines are not working well for your pain.      •Develop new symptoms or side effects when you are taking medicines.        Get help right away if:    •You or someone else is having symptoms of an opioid overdose. Get help even if you are not sure.      •You have serious thoughts about hurting yourself or others.    •You have:  •Chest pain.      •Difficulty breathing.      •A loss of consciousness.        These symptoms may represent a serious problem that is an emergency. Do not wait to see if the symptoms will go away. Get medical help right away. Call your local emergency services (605 in the [place] Do not drive yourself to the hospital.   If you ever feel like you may hurt yourself or others, or have thoughts about taking your own life, get help right away. You can go to your nearest emergency department or call:   • Your local emergency services (861 in the [place]       • A suicide crisis helpline, such as the National Suicide Prevention Lifeline at 1-859.775.6358. This is open 24 hours a day.         Summary    •Opioids are drugs that are often used to treat pain. Opioids include illegal drugs, such as heroin, as well as prescription pain medicines.      •An opioid overdose happens when you take too much of an opioid.      •Overdoses can be intentional or accidental.      •Opioid overdose is very dangerous. It is a life-threatening emergency.      •If you or someone you know is experiencing an opioid overdose, get help right away.      This information is not intended to replace advice given to you by your health care provider. Make sure you discuss any questions you have with your health care provider.      Document Revised: 12/05/2019 Document Reviewed: 12/05/2019    Elsevier Patient Education © 2021 [employer]

## 2021-06-12 NOTE — ED PROVIDER NOTE - PATIENT PORTAL LINK FT
You can access the FollowMyHealth Patient Portal offered by [facility] by registering at the following website: [email or web address]. By joining [employer]’s FollowMyHealth portal, you will also be able to view your health information using other applications (apps) compatible with our system.

## 2021-06-12 NOTE — ED ADULT NURSE NOTE - OBJECTIVE STATEMENT
66yo male BIBA c/o heroin overdose. Pt. reports doing several bags of heroin and states, "Some people called an ambulance on me. I want to leave now." Pt. anxious, refusing to allow complete physical assessment and answering all assessment questions. Right sided facial weakness and right sided neglect noted, pt. refusing full assessment. Denies any pain, no signs of distress. 66yo male BIBA c/o heroin overdose. Pt. reports doing several bags of heroin and states, "Some people called an ambulance on me. I want to leave now." Pt. anxious, refusing to allow complete physical assessment and answering all assessment questions. Denies any pain, no signs of distress.

## 2021-08-04 NOTE — ED PROVIDER NOTE - CPE EDP [facility] NORM
Patient verbally informed that body search would be performed to  remove any items that may be potentially used for self harm or suicidal behavior and advised about what would occur during the search process.  Patient denies being in possession of potentially dangerous items.  The patient was provided with an opportunity to ask questions or voice any concerns related to the body surface search prior to it being performed.  The patient agreed to participate in the search process.  Body surface search was conducted by two staff members: (Farhana MCGHEE, Toni ROBLERO).  Patient response to search process was Cooperative with no adverse physical or psychological response.  Contraband was not found during the search process.    Body search revealed IV site in bilateral antecubital spaces, scars on right shoulder, minor/superficial pressure lesions on bilateral lateral buttocks (pt states due to sleeping on floor - does not own a bed) and burn on dorsal right wrist.     Keys and multiple cards sent to security.     - - -

## 2021-10-02 NOTE — ED ADULT NURSE NOTE - CADM POA URETHRAL CATHETER
PROGRESS NOTE      Taye Ware Patient Status:  Inpatient    1956 MRN [id number]   Location [facility] ICU MEDICAL Attending Enriqueta Ramos MD   Hosp Day # 4 PCP Katie Ocasio MD       Subjective  Feeling well  Breathing is \"great\"    Objective    No intake or output data in the 24 hours ending 10/02/21 1243     Last Recorded Vitals  Blood pressure (!) 143/80, pulse 77, temperature 98.2 °F (36.8 °C), temperature source Axillary, resp. rate 14, height 5' 0.98\" (1.549 m), weight 102.1 kg (225 lb), SpO2 93 %.  Body mass index is 42.54 kg/m².    Physical Exam  Vitals reviewed.   HENT:      Head: Normocephalic and atraumatic.   Eyes:      Pupils: Pupils are equal, round, and reactive to light.   Neck:      Trachea: No tracheal deviation.   Cardiovascular:      Rate and Rhythm: Normal rate and regular rhythm.   Pulmonary:      Effort: Pulmonary effort is normal. No respiratory distress.      Breath sounds: No stridor. No wheezing or rales.   Chest:      Chest wall: No tenderness.   Abdominal:      General: There is no distension.      Palpations: Abdomen is soft.      Tenderness: There is no abdominal tenderness. There is no guarding or rebound.   Musculoskeletal:         General: No tenderness or deformity. Normal range of motion.      Cervical back: Neck supple.   Skin:     General: Skin is warm and dry.      Findings: No erythema or rash.   Neurological:      Mental Status: She is alert and oriented to person, place, and time.   Psychiatric:         Mood and Affect: Mood and affect normal.         Cognition and Memory: Memory normal.         Judgment: Judgment normal.          Current Facility-Administered Medications   Medication Dose Route Frequency Provider Last Rate Last Admin   • furosemide (LASIX INJECT) injection 40 mg  40 mg Intravenous Daily Sergei Thomson MD   40 mg at 10/02/21 0911   • lisinopril (ZESTRIL) tablet 10 mg  10 mg Oral Daily Daniele Ware MD   10 mg at 10/02/21 1040   •  dilTIAZem (TIAZAC,CARDIZEM CD) 24 hr capsule 120 mg  120 mg Oral Daily Daniele Ware MD   120 mg at 10/02/21 0909   • albuterol (VENTOLIN) nebulizer 10 mg  10 mg Nebulization Once Josh Peoples MD       • remdesivir 100 mg in sodium chloride 0.9 % 250 mL total volume infusion  100 mg Intravenous Daily at noon Josh Peoples  mL/hr at 10/02/21 1210 100 mg at 10/02/21 1210   • aspirin (ECOTRIN) enteric coated tablet 81 mg  81 mg Oral Daily Adriano Walsh MD   81 mg at 10/02/21 0910   • atorvastatin (LIPITOR) tablet 40 mg  40 mg Oral Nightly Adriano Walsh MD   40 mg at 10/01/21 2159   • melatonin tablet 6 mg  6 mg Oral Nightly PRN Adriano Walsh MD       • hydrALAZINE (APRESOLINE) injection 10 mg  10 mg Intravenous Q6H PRN Adriano Walsh MD       • dexamethasone (PF) (DECADRON) injection 6 mg  6 mg Intravenous Daily Adriano Walsh MD   6 mg at 10/02/21 0910   • albuterol inhaler 2 puff  2 puff Inhalation 4x Daily Resp Adriano Walsh MD   2 puff at 10/02/21 1214   • ipratropium (ATROVENT HFA) 17 MCG/ACT inhaler 2 puff  2 puff Inhalation 4x Daily Resp Adriano Walsh MD   2 puff at 10/02/21 1215   • sodium chloride 0.9 % flush bag 25 mL  25 mL Intravenous PRN Adriano Walsh MD       • sodium chloride (PF) 0.9 % injection 2 mL  2 mL Intracatheter 2 times per day Adriano Walsh MD   2 mL at 10/02/21 0910   • Potassium Standard Replacement Protocol   Does not apply See Admin Instructions Adriano Walsh MD       • Magnesium Standard Replacement Protocol   Does not apply See Admin Instructions Adriano Walsh MD       • ondansetron (ZOFRAN) injection 4 mg  4 mg Intravenous BID PRN Adriano Walsh MD       • prochlorperazine (COMPAZINE) injection 5 mg  5 mg Intravenous Q4H PRN Adriano Walsh MD       • acetaminophen (TYLENOL) tablet 650 mg  650 mg Oral Q4H PRN Adriano Walsh MD       • HYDROcodone-acetaminophen (NORCO) 5-325 MG per tablet 1 tablet  1 tablet Oral Q4H PRN Adriano Walsh MD       •  docusate sodium-sennosides (SENOKOT S) 50-8.6 MG 2 tablet  2 tablet Oral Daily PRN Adriano Walsh MD       • aluminum-magnesium hydroxide-simethicone (MAALOX) 200-200-20 MG/5ML suspension 20 mL  20 mL Oral Q4H PRN Adriano Walsh MD       • sodium chloride 0.9 % flush bag 25 mL  25 mL Intravenous PRN Adriano Walsh MD       • enoxaparin (LOVENOX) injection 40 mg  40 mg Subcutaneous 2 times per day Adriano Walsh MD   40 mg at 10/02/21 0910         Labs     Recent Results (from the past 24 hour(s))   GLUCOSE, BEDSIDE - POINT OF CARE    Collection Time: 10/01/21  8:12 PM   Result Value Ref Range    GLUCOSE, BEDSIDE - POINT OF CARE 157 (H) 70 - 99 mg/dL   GLUCOSE, BEDSIDE - POINT OF CARE    Collection Time: 10/02/21  6:03 AM   Result Value Ref Range    GLUCOSE, BEDSIDE - POINT OF CARE 107 (H) 70 - 99 mg/dL   Comprehensive Metabolic Panel    Collection Time: 10/02/21  7:38 AM   Result Value Ref Range    Fasting Status      Sodium 134 (L) 135 - 145 mmol/L    Potassium 4.5 3.4 - 5.1 mmol/L    Chloride 103 98 - 107 mmol/L    Carbon Dioxide 32 21 - 32 mmol/L    Anion Gap 4 (L) 10 - 20 mmol/L    Glucose 107 (H) 70 - 99 mg/dL    BUN 16 6 - 20 mg/dL    Creatinine 0.60 0.51 - 0.95 mg/dL    Glomerular Filtration Rate >90 >=60    BUN/ Creatinine Ratio 27 (H) 7 - 25    Calcium 8.8 8.4 - 10.2 mg/dL    Bilirubin, Total 0.3 0.2 - 1.0 mg/dL    GOT/AST 26 <=37 Units/L    GPT/ALT 34 <64 Units/L    Alkaline Phosphatase 78 45 - 117 Units/L    Albumin 2.5 (L) 3.6 - 5.1 g/dL    Protein, Total 6.3 (L) 6.4 - 8.2 g/dL    Globulin 3.8 2.0 - 4.0 g/dL    A/G Ratio 0.7 (L) 1.0 - 2.4   CBC No Differential    Collection Time: 10/02/21  7:38 AM   Result Value Ref Range    WBC 15.9 (H) 4.2 - 11.0 K/mcL    RBC 5.81 (H) 4.00 - 5.20 mil/mcL    HGB 12.1 12.0 - 15.5 g/dL    HCT 40.5 36.0 - 46.5 %    MCV 69.7 (L) 78.0 - 100.0 fl    MCH 20.8 (L) 26.0 - 34.0 pg    MCHC 29.9 (L) 32.0 - 36.5 g/dL     (H) 140 - 450 K/mcL    RDW-CV 15.2 (H) 11.0 - 15.0  %    RDW-SD 36.3 (L) 39.0 - 50.0 fL    NRBC 0 <=0 /100 WBC   GLUCOSE, BEDSIDE - POINT OF CARE    Collection Time: 10/02/21 11:53 AM   Result Value Ref Range    GLUCOSE, BEDSIDE - POINT OF CARE 148 (H) 70 - 99 mg/dL        Imaging  XR CHEST PA OR AP 1 VIEW    Result Date: 2021  Narrative: EXAM: Portable chest single view 2021 at 1656 hours CLINICAL INDICATION: Shortness of breath. COMPARISON: None. Portable AP semiupright view of chest is submitted. FINDINGS: Patient is rotated. Cardiac silhouette and pulmonary vasculature appear within normal limits. Diffuse atelectasis/infiltrates and/or pulmonary edema in left lung and atelectasis/infiltrates and/or pulmonary edema in mid to lower right lung are identified.  Elevated right hemidiaphragm is noted.  No significant pleural effusion or pneumothorax is seen.  Tortuous thoracic aorta, degenerative changes and scoliosis/curvature of thoracic spine, and degenerative changes of bilateral shoulders are identified.     Impression: Atelectasis/infiltrates and/or pulmonary edema in bilateral lungs, more severe on the left.  Bilateral pneumonia/pneumonitis cannot be excluded.  Recommend correlation with clinical and laboratory assessment as well as follow-up study to document resolution. Electronically Signed by: RAVINDER HAYES M.D. Signed on: 2021 4:58 PM     TRANSTHORACIC ECHO (TTE) LIMITED W/ W/O IMAGING AGENT    Impression: *[facility]* [address] [place] [id number] [phone] Limited Transthoracic Echocardiogram (TTE) Patient: Taye Ware    Study Date/Time:      Sep 29 2021 9:59AM MRN:     [id number]           FIN#:                 [id number] :     1956         Ht/Wt:                154cm 102.1kg Age:     65                 BSA/BMI:              1.98m^2 43kg/m^2 Gender:  F                  Baseline BP:          113 / 50 Ordering Physician:   Adriano Walsh  Referring Physician:  Adriano Walsh Serge S   Attending Physician:  Enriqueta Ramos  Diagnostic Physician: Daniele Ware MD Sonographer:          Analy Busby  -------------------------------------------------------------------------- INDICATIONS:   TROPONIN INCREASED IN BLOOD SPECIMEN. -------------------------------------------------------------------------- STUDY CONCLUSIONS SUMMARY: 1. Left ventricle: The cavity size is normal. There is moderate asymmetric    hypertrophy of the septum. The peak systolic gradient is 28 mm Hg. The    ejection fraction was measured by 3D assessment. Doppler parameters are    consistent with abnormal left ventricular relaxation (grade 1 diastolic    dysfunction). The ejection fraction is 65%. 2. Left atrium: The atrium is mildly dilated. 3. Right ventricle: Systolic function is normal. 4. Right atrium: The atrium is mildly to moderately dilated. The estimated    central venous pressure is 3mm Hg. 5. Tricuspid valve: Moderate regurgitation. 6. Pulmonary arteries: The peak pressure during systole by Doppler is 39mm    Hg. -------------------------------------------------------------------------- STUDY DATA:   Procedure:  Transthoracic echocardiography was performed. Image quality was good.  Study status:  Routine.  Study completion:  There were no complications. FINDINGS LEFT VENTRICLE:  The cavity size is normal. There is moderate asymmetric hypertrophy of the septum.    The ejection fraction was measured by 3D assessment. The ejection fraction is 65%. Doppler parameters are consistent with abnormal left ventricular relaxation (grade 1 diastolic dysfunction). AORTIC VALVE:  The annulus is noncalcified.  Doppler:  Transvalvular velocity is within the normal range. There is no stenosis.  No regurgitation.    The LVOT to aortic valve VTI ratio is 0.71. The valve area by the velocity-time integral method is 2.0cm^2. The valve area index by the velocity-time integral method is 1.03cm^2/m^2. The ratio of LVOT to aortic valve  No peak velocity is 0.8. The valve area by the peak velocity method is 2.3cm^2. The valve area index by the peak velocity method is 1.15cm^2/m^2. MITRAL VALVE:  The annulus is noncalcified.  Doppler:  Transvalvular velocity is within the normal range. There is no evidence for stenosis. Mild to moderate regurgitation.    The valve area (LVOT continuity) is 5.3cm^2. The valve area index (LVOT continuity) is 2.68cm^2/m^2.    The mean diastolic gradient is 3mm Hg. LEFT ATRIUM:  The atrium is mildly dilated. RIGHT VENTRICLE:  The cavity size is mildly dilated. Systolic function is normal. TRICUSPID VALVE:   Structurally normal valve.    Doppler:   Moderate regurgitation. RIGHT ATRIUM:  The atrium is mildly to moderately dilated. PERICARDIUM:  There is no pericardial effusion. SYSTEMIC VEINS: Inferior vena cava: The vessel is normal in size. The respirophasic diameter changes are in the normal range (greater than or equal to 50%). -------------------------------------------------------------------------- Measurements  Left ventricle            Value          Ref      Mitral valve continued     Value          Ref  EF                        65    %        54 - 74  Peak A                     0.95  m/sec    -----  SV                        102   ml       -------  Mean v, D                  0.8   m/sec    -----  SV/bsa                    52    ml/m^2   -------  VTI leaflet coapt          19.2  cm       -----                                                    Decel time                 222   ms       -----  LVOT                      Value          Ref      Mean grad, D               3     mm Hg    -----  Diam, S                   1.9   cm       -------  Peak E/A ratio             0.7            -----  Area                      2.8   cm^2     -------  A-VTI                      19.2  cm       -----  Peak javier, S               2.1   m/sec    -------  MVA, LVOT cont             5.3   cm^2     -----  Peak grad, S              18    mm  Hg    -------  MVA/bsa, LVOT cont         2.68  cm^2/m^2 -----                                                    MR vol, LVOT cont          4     ml       -----  Right ventricle           Value          Ref      MR peak v                  5.85  m/sec    -----  Pressure, S               39    mm Hg    -------  Peak LV-LA grad S          137   mm Hg    -----                                                    Max MR v                   5.85  m/sec    -----  Aortic valve              Value          Ref      Regurg VTI                 125.0 cm       -----  Peak v, S                 2.6   m/sec    -------  ERO, PISA                  0.03  cm^2     -----  Mean v, S                 1.96  m/sec    -------  LVOT/AV, VTI ratio        0.71           -------  Tricuspid valve            Value          Ref  CHANELLE, VTI                  2.0   cm^2     -------  TR peak v          (H)     3     m/sec    <=2.8  CHANELLE/bsa, VTI              1.03  cm^2/m^2 -------  Peak RV-RA grad, S         36    mm Hg    -----  LVOT/AV, Vpeak ratio      0.8            -------  Max TR javier                 2.99  m/sec    -----  CHANELLE, Vmax                 2.3   cm^2     -------  CHANELLE/bsa, Vmax             1.15  cm^2/m^2 -------  Pulmonary artery           Value          Ref                                                    Pressure, S                39    mm Hg    -----  Mitral valve              Value          Ref  Farzana diam                  2.7   cm       -------  Systemic veins             Value          Ref  Peak E                    0.67  m/sec    -------  Estimated CVP              3     mm Hg    ----- Legend: (L)  and  (H)  anthony values outside specified reference range. Prepared and electronically signed by Anthony Ware MD 09/29/2021 12:37    CTA CHEST PULMONARY EMBOLISM W CONTRAST    Result Date: 9/29/2021  Narrative: PROCEDURE: CT CHEST WITH CONTRAST - PE PROTOCOL, INDICATION/CLINICAL HISTORY: 65 years-old Female presenting with hypoxia. Covid  pneumonia COMPARISON: No prior exams are currently available TECHNIQUE: Axial acquisitions were obtained through the chest following administration of IV contrast material with scan timing optimized for evaluation of the pulmonary arteries. Multiplanar reformatted imaging was performed. 3-D postprocessing is performed including MIPS imaging by technologist under direct supervision of the radiologist on an acquisition scanner.  CONTRAST: Name: Omnipaque 350 Volume: 90 mL FINDINGS: Lower neck/thyroid/chest wall: Diffuse thyromegaly and Multinodular thyroid gland with the thyroid gland and nodules extending substernally. Thoracic aorta and great vessels/Pulmonary arteries:  Grossly unremarkable Contrast bolus timing is satisfactory without evidence of central through segmental pulmonary arterial emboli. Heart and pericardium: No detectable coronary arterial calcification. The heart is enlarged. Lungs/Pleural space: Diffuse scattered bilateral lung infiltrates are identified. . Mediastinum/Hilum/lymph nodes: Calcified right hilar and mediastinal lymph nodes..  There is additionally enlarged noncalcified lymph nodes identified in the mediastinum and bilateral elsi VISUALIZED UPPER ABDOMEN: Calcified liver and splenic granulomas.. MUSCULOSKELETAL: Multilevel anterior osteophytes or syndesmophytes in the visualized thoracic and upper lumbar spine..  Additionally there is ossification along the interspinous interval in the lower thoracic and upper lumbar spine.     Impression: 1.   Negative for pulmonary embolism from the central through segmental pulmonary arteries. 2.   Diffuse scattered bilateral lung infiltrates suspicious for pneumonia including atypical/viral pneumonia such as Covid 19.  3.   Mediastinal and bilateral lymphadenopathy.  Findings may be reactive. However clinical follow-up is advised.  4.   Findings may be compatible with multinodular goiter with substernal extension.  Recommend nonemergent thyroid  ultrasound. 5.   Multilevel anterior osteophytes or syndesmophytes in the visualized thoracic and upper lumbar spine.  Recommend further evaluation with dedicated sacroiliac x-rays be safely exclude seronegative arthropathy or ankylosing spondylitis. FOR PHYSICIAN USE ONLY - Please note that this report was generated using voice recognition software.  If you require clarification or feel that there has been an error in this report please contact me through [employer].  Thank you very much for allowing me to participate in the care of your patient. Electronically Signed by: PILI ROMERO Signed on: 9/29/2021 12:19 AM     US VAS EXTREMITY LOWER VENOUS DUPLEX    Result Date: 9/28/2021  Narrative: EXAM: US [facility] LOWER EXTREMITY VENOUS DUPLEX BILATERAL CLINICAL INDICATION: Bilateral lower extremity pain and swelling. COMPARISON: No previous examination for comparison. FINDINGS: Duplex compression technique has been used to examine the common femoral, superficial femoral, popliteal vein and proximal portion of the greater saphenous vein, profunda vein, posterior tibialis veins and peroneal veins of the left and right lower extremity.  Proximal calf veins of the left and right lower extremity were not well seen most likely due to technical limitations of examination.  Complete compressibility and color-flow within the remainder of the assessed veins of the left and right lower extremity.     Impression: No evidence of deep venous thrombosis of the left and right lower extremity. If symptoms suggesting DVT persist, a follow-up ultrasound study is recommended the next day.  If this, too, is negative and symptoms persist, a final ultrasound is recommended at one week.  Two negative exams one week apart is considered sufficient to exclude DVT. (The rationale for the repeat exams is that an undetectable calf vein thrombus may propagate superiorly and become a threat for clinically significant pulmonary embolism, but will  be sonographically detectable once it reaches the popliteal vein.) Electronically Signed by: SIMON EATON M.D. Signed on: 9/28/2021 7:11 PM          Assessment & Plan  Patient is a 65-year-old female with a past medical history significant for chronic asthma who presented to the ED with complaint of worsening shortness of breath.      Acute respiratory failure with hypoxia due to COVID-19 pneumonia  Acute viral sepsis due to severe COVID-19 pneumonia  Cont on remdesivir, steroids, baricitinib, atrovent hfa. S/p tocilizumab.  Currently on optiflow 50L at 81%.   CTA negative for pulmonary embolism, venous duplex negative.      Acute type II MI  Diastolic dysfuntion  HTN  Downtrending.  Transthoracic echocardiogram ok. Cardiology consulted. Started on cardizem and lisinopril.  Explained why to patient. Advised we will reevaluate prior to discharge     Multinodular goiter on imaging  Low tsh, normal ft4, recommend repeat tfts in 1 month.  Outpatient thyroid ultrasound     Hyponatremia  resolved     Hyperglycemia  Hemoglobin A1C (%)   Date Value   09/29/2021 6.4 (H)          Chronic asthma  Resume home meds        DVT Prophylaxis  Current Active Medications for DVT Prophylaxis (From admission, onward)         Stop     enoxaparin (LOVENOX) injection 40 mg  40 mg,   Subcutaneous,   2 times per day         --              Total critical care time: 36 minutes  Due to a high probability of clinically significant, life-threatening deterioration, the patient required my highest level of preparedness to intervene emergently and I have personally spent the critical care time (excluding billable procedure time) directly and personally managing the patient.       Enriqueta Ramos MD  10/2/2021      This note was generated in part using \"Dragon\" voice recognition technology. The report was reviewed by this physician, but still may have unintentional errors due to inherent limitations of voice recognition technology.

## 2021-11-16 NOTE — ED ADULT TRIAGE NOTE - HEART RATE (BEATS/MIN)
[ Symptoms] :  SYMPTOMS [FreeTextEntry6] : History obtained using ( ID: [id number]) \par 1 year old female with PMH of speech delay presenting with c/o increased urinary frequency and crying when she has to urinate onset 3 weeks ago. Mother denies any fever. No known sick contacts. Has a prior h/o similar symptoms. Patient's last bowel movement was 2 days ago and stool was noted to be hard. As per mother, was seen in [facility] ED blood work and urine checked and WNL, d/c home with PMD follow up. Mother does not have discharge paperwork or labs with her at this time. No rash. No edema. No conjunctivitis. No further concerns. 63

## 2021-12-25 ENCOUNTER — EMERGENCY (EMERGENCY)
Facility: HOSPITAL | Age: 66
LOS: 1 days | Discharge: HOME | End: 2021-12-25
Admitting: EMERGENCY MEDICINE
Payer: COMMERCIAL

## 2021-12-25 VITALS
TEMPERATURE: 98 F | OXYGEN SATURATION: 98 % | HEART RATE: 105 BPM | SYSTOLIC BLOOD PRESSURE: 125 MMHG | HEIGHT: 71 IN | RESPIRATION RATE: 20 BRPM | DIASTOLIC BLOOD PRESSURE: 75 MMHG | WEIGHT: 149.91 LBS

## 2021-12-25 DIAGNOSIS — Z59.00 HOMELESSNESS UNSPECIFIED: ICD-10-CM

## 2021-12-25 DIAGNOSIS — M25.511 PAIN IN RIGHT SHOULDER: ICD-10-CM

## 2021-12-25 DIAGNOSIS — Z53.29 PROCEDURE AND TREATMENT NOT CARRIED OUT BECAUSE OF PATIENT'S DECISION FOR OTHER REASONS: ICD-10-CM

## 2021-12-25 DIAGNOSIS — Z87.898 PERSONAL HISTORY OF OTHER SPECIFIED CONDITIONS: Chronic | ICD-10-CM

## 2021-12-25 PROBLEM — F11.10 OPIOID ABUSE, UNCOMPLICATED: Chronic | Status: ACTIVE | Noted: 2021-06-12

## 2021-12-25 PROCEDURE — 99282 EMERGENCY DEPT VISIT SF MDM: CPT

## 2021-12-25 RX ORDER — ACETAMINOPHEN 500 MG
650 TABLET ORAL ONCE
Refills: 0 | Status: DISCONTINUED | OUTPATIENT
Start: 2021-12-25 | End: 2021-12-28

## 2021-12-25 SDOH — ECONOMIC STABILITY - HOUSING INSECURITY: HOMELESSNESS UNSPECIFIED: Z59.00

## 2021-12-25 NOTE — ED ADULT NURSE NOTE - OBJECTIVE STATEMENT
received A&OX3 66years old male pt with c/o of "my right side shoulder hurts." pt denies fall or trauma, no swelling or redness noted.

## 2021-12-25 NOTE — ED PROVIDER NOTE - CLINICAL SUMMARY MEDICAL DECISION MAKING FREE TEXT BOX
65 yo male , undomicile, in the ER c/o atraumatic shoulder pain for the past 2-3 days. Denies any fall or injury, denies  CP, SOB, cough, fever or chills. 67 yo male with h/o heroin abuse, undomicile, in the ER c/o atraumatic shoulder pain for the past 2-3 days. Denies any fall or injury, denies  CP, SOB, cough, fever or chills.  Pt appears to be under influence of some substance, agitated, poor historian. will check Xray, and give tylenol for pain, re-evaluate

## 2021-12-25 NOTE — ED PROVIDER NOTE - OBJECTIVE STATEMENT
Pt w/ PMHx polysubstance abuse, PSHx excision of brain tumor as a child? in the Er c/o pain to his right shoulder x 2 days.  Pt awake, alert in the ED, appears "high"  Pt denies falls /trauma, weakness, numbness to RUE, denies CP, SOB.  Pt has no other physical complaints.

## 2021-12-25 NOTE — ED PROVIDER NOTE - PHYSICAL EXAMINATION
Physical Exam:    CONSTITUTIONAL:  Generally well appearing, no acute distress, alert, awake and oriented  HEENT:  Moist mucous membranes, normal voice  PULM:  No accessory muscle use, speaking full sentences  SKIN:  Normal in appearance, normal color  MUSC: diffuse tenderness to right shoulder, no deformity, distal pulses present

## 2022-09-28 ENCOUNTER — EMERGENCY (EMERGENCY)
Facility: HOSPITAL | Age: 67
LOS: 1 days | Discharge: HOME | End: 2022-09-28
Attending: STUDENT IN AN ORGANIZED HEALTH CARE EDUCATION/TRAINING PROGRAM | Admitting: EMERGENCY MEDICINE
Payer: MEDICARE

## 2022-09-28 VITALS
DIASTOLIC BLOOD PRESSURE: 86 MMHG | RESPIRATION RATE: 16 BRPM | OXYGEN SATURATION: 97 % | TEMPERATURE: 98 F | SYSTOLIC BLOOD PRESSURE: 132 MMHG | HEART RATE: 72 BPM

## 2022-09-28 VITALS
SYSTOLIC BLOOD PRESSURE: 145 MMHG | RESPIRATION RATE: 15 BRPM | HEIGHT: 71 IN | TEMPERATURE: 99 F | DIASTOLIC BLOOD PRESSURE: 73 MMHG | HEART RATE: 80 BPM | OXYGEN SATURATION: 97 %

## 2022-09-28 DIAGNOSIS — F17.210 NICOTINE DEPENDENCE, CIGARETTES, UNCOMPLICATED: ICD-10-CM

## 2022-09-28 DIAGNOSIS — F11.10 OPIOID ABUSE, UNCOMPLICATED: ICD-10-CM

## 2022-09-28 DIAGNOSIS — F19.10 OTHER PSYCHOACTIVE SUBSTANCE ABUSE, UNCOMPLICATED: ICD-10-CM
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
## 2022-09-28 DIAGNOSIS — T40.1X1A POISONING BY HEROIN, ACCIDENTAL (UNINTENTIONAL), INITIAL ENCOUNTER: ICD-10-CM

## 2022-09-28 DIAGNOSIS — Z86.011 PERSONAL HISTORY OF BENIGN NEOPLASM OF THE BRAIN: ICD-10-CM

## 2022-09-28 DIAGNOSIS — Z20.822 CONTACT WITH AND (SUSPECTED) EXPOSURE TO COVID-19: ICD-10-CM

## 2022-09-28 DIAGNOSIS — Z87.898 PERSONAL HISTORY OF OTHER SPECIFIED CONDITIONS: Chronic | ICD-10-CM

## 2022-09-28 DIAGNOSIS — K40.90 UNILATERAL INGUINAL HERNIA, WITHOUT OBSTRUCTION OR GANGRENE, NOT SPECIFIED AS RECURRENT: ICD-10-CM
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
## 2022-09-28 LAB
ALBUMIN SERPL ELPH-MCNC: 3.7 G/DL — SIGNIFICANT CHANGE UP (ref 3.3–5)
ALP SERPL-CCNC: 96 U/L — SIGNIFICANT CHANGE UP (ref 40–120)
ALT FLD-CCNC: 9 U/L — LOW (ref 10–45)
ANION GAP SERPL CALC-SCNC: 10 MMOL/L — SIGNIFICANT CHANGE UP (ref 5–17)
APAP SERPL-MCNC: 7 UG/ML — LOW (ref 10–30)
AST SERPL-CCNC: 13 U/L — SIGNIFICANT CHANGE UP (ref 10–40)
BASE EXCESS BLDV CALC-SCNC: 2.2 MMOL/L — SIGNIFICANT CHANGE UP (ref -2–3)
BASOPHILS # BLD AUTO: 0.03 K/UL — SIGNIFICANT CHANGE UP (ref 0–0.2)
BASOPHILS NFR BLD AUTO: 0.7 % — SIGNIFICANT CHANGE UP (ref 0–2)
BILIRUB SERPL-MCNC: 0.2 MG/DL — SIGNIFICANT CHANGE UP (ref 0.2–1.2)
BUN SERPL-MCNC: 15 MG/DL — SIGNIFICANT CHANGE UP (ref 7–23)
CA-I SERPL-SCNC: 1.2 MMOL/L — SIGNIFICANT CHANGE UP (ref 1.15–1.33)
CALCIUM SERPL-MCNC: 9 MG/DL — SIGNIFICANT CHANGE UP (ref 8.4–10.5)
CHLORIDE SERPL-SCNC: 106 MMOL/L — SIGNIFICANT CHANGE UP (ref 96–108)
CO2 BLDV-SCNC: 29.2 MMOL/L — HIGH (ref 22–26)
CO2 SERPL-SCNC: 27 MMOL/L — SIGNIFICANT CHANGE UP (ref 22–31)
CREAT SERPL-MCNC: 0.93 MG/DL — SIGNIFICANT CHANGE UP (ref 0.5–1.3)
EGFR: 89 ML/MIN/1.73M2 — SIGNIFICANT CHANGE UP
EOSINOPHIL # BLD AUTO: 0.06 K/UL — SIGNIFICANT CHANGE UP (ref 0–0.5)
EOSINOPHIL NFR BLD AUTO: 1.4 % — SIGNIFICANT CHANGE UP (ref 0–6)
ETHANOL SERPL-MCNC: <10 MG/DL — SIGNIFICANT CHANGE UP (ref 0–10)
GAS PNL BLDV: 136 MMOL/L — SIGNIFICANT CHANGE UP (ref 136–145)
GAS PNL BLDV: SIGNIFICANT CHANGE UP
GLUCOSE SERPL-MCNC: 93 MG/DL — SIGNIFICANT CHANGE UP (ref 70–99)
HCO3 BLDV-SCNC: 28 MMOL/L — SIGNIFICANT CHANGE UP (ref 22–29)
HCT VFR BLD CALC: 41.5 % — SIGNIFICANT CHANGE UP (ref 39–50)
HGB BLD-MCNC: 13.2 G/DL — SIGNIFICANT CHANGE UP (ref 13–17)
IMM GRANULOCYTES NFR BLD AUTO: 0.7 % — SIGNIFICANT CHANGE UP (ref 0–0.9)
LYMPHOCYTES # BLD AUTO: 1.63 K/UL — SIGNIFICANT CHANGE UP (ref 1–3.3)
LYMPHOCYTES # BLD AUTO: 36.9 % — SIGNIFICANT CHANGE UP (ref 13–44)
MAGNESIUM SERPL-MCNC: 2.1 MG/DL — SIGNIFICANT CHANGE UP (ref 1.6–2.6)
MCHC RBC-ENTMCNC: 29.1 PG — SIGNIFICANT CHANGE UP (ref 27–34)
MCHC RBC-ENTMCNC: 31.8 GM/DL — LOW (ref 32–36)
MCV RBC AUTO: 91.4 FL — SIGNIFICANT CHANGE UP (ref 80–100)
MONOCYTES # BLD AUTO: 0.3 K/UL — SIGNIFICANT CHANGE UP (ref 0–0.9)
MONOCYTES NFR BLD AUTO: 6.8 % — SIGNIFICANT CHANGE UP (ref 2–14)
NEUTROPHILS # BLD AUTO: 2.37 K/UL — SIGNIFICANT CHANGE UP (ref 1.8–7.4)
NEUTROPHILS NFR BLD AUTO: 53.5 % — SIGNIFICANT CHANGE UP (ref 43–77)
NRBC # BLD: 0 /100 WBCS — SIGNIFICANT CHANGE UP (ref 0–0)
PCO2 BLDV: 46 MMHG — SIGNIFICANT CHANGE UP (ref 42–55)
PH BLDV: 7.39 — SIGNIFICANT CHANGE UP (ref 7.32–7.43)
PHOSPHATE SERPL-MCNC: 3.3 MG/DL — SIGNIFICANT CHANGE UP (ref 2.5–4.5)
PLATELET # BLD AUTO: 196 K/UL — SIGNIFICANT CHANGE UP (ref 150–400)
PO2 BLDV: 63 MMHG — HIGH (ref 25–45)
POTASSIUM BLDV-SCNC: 3.9 MMOL/L — SIGNIFICANT CHANGE UP (ref 3.5–5.1)
POTASSIUM SERPL-MCNC: 4.3 MMOL/L — SIGNIFICANT CHANGE UP (ref 3.5–5.3)
POTASSIUM SERPL-SCNC: 4.3 MMOL/L — SIGNIFICANT CHANGE UP (ref 3.5–5.3)
PROT SERPL-MCNC: 6.8 G/DL — SIGNIFICANT CHANGE UP (ref 6–8.3)
RBC # BLD: 4.54 M/UL — SIGNIFICANT CHANGE UP (ref 4.2–5.8)
RBC # FLD: 13.4 % — SIGNIFICANT CHANGE UP (ref 10.3–14.5)
SALICYLATES SERPL-MCNC: <0.3 MG/DL — LOW (ref 2.8–20)
SAO2 % BLDV: 91.8 % — HIGH (ref 67–88)
SARS-COV-2 RNA SPEC QL NAA+PROBE: SIGNIFICANT CHANGE UP
SODIUM SERPL-SCNC: 143 MMOL/L — SIGNIFICANT CHANGE UP (ref 135–145)
WBC # BLD: 4.42 K/UL — SIGNIFICANT CHANGE UP (ref 3.8–10.5)
WBC # FLD AUTO: 4.42 K/UL — SIGNIFICANT CHANGE UP (ref 3.8–10.5)

## 2022-09-28 PROCEDURE — 84295 ASSAY OF SERUM SODIUM: CPT

## 2022-09-28 PROCEDURE — U0005: CPT

## 2022-09-28 PROCEDURE — 99285 EMERGENCY DEPT VISIT HI MDM: CPT

## 2022-09-28 PROCEDURE — 82803 BLOOD GASES ANY COMBINATION: CPT

## 2022-09-28 PROCEDURE — 84100 ASSAY OF PHOSPHORUS: CPT

## 2022-09-28 PROCEDURE — 93005 ELECTROCARDIOGRAM TRACING: CPT

## 2022-09-28 PROCEDURE — 80307 DRUG TEST PRSMV CHEM ANLYZR: CPT

## 2022-09-28 PROCEDURE — 80053 COMPREHEN METABOLIC PANEL: CPT

## 2022-09-28 PROCEDURE — 84132 ASSAY OF SERUM POTASSIUM: CPT

## 2022-09-28 PROCEDURE — 36415 COLL VENOUS BLD VENIPUNCTURE: CPT

## 2022-09-28 PROCEDURE — 85025 COMPLETE CBC W/AUTO DIFF WBC: CPT

## 2022-09-28 PROCEDURE — 99284 EMERGENCY DEPT VISIT MOD MDM: CPT

## 2022-09-28 PROCEDURE — 84484 ASSAY OF TROPONIN QUANT: CPT

## 2022-09-28 PROCEDURE — 82962 GLUCOSE BLOOD TEST: CPT

## 2022-09-28 PROCEDURE — 83735 ASSAY OF MAGNESIUM: CPT

## 2022-09-28 PROCEDURE — 82330 ASSAY OF CALCIUM: CPT

## 2022-09-28 PROCEDURE — U0003: CPT

## 2022-09-28 NOTE — ED PROVIDER NOTE - PROGRESS NOTE DETAILS
on reassessment pt awake and alert nad explained to him that he has pvcs on his ekg that need to followup with his pmd states he can followup at [facility]

## 2022-09-28 NOTE — ED PROVIDER NOTE - PATIENT PORTAL LINK FT
You can access the FollowMyHealth Patient Portal offered by [facility] by registering at the following website: [email or web address]. By joining [employer]’s FollowMyHealth portal, you will also be able to view your health information using other applications (apps) compatible with our system.

## 2022-09-28 NOTE — ED PROVIDER NOTE - CLINICAL SUMMARY MEDICAL DECISION MAKING FREE TEXT BOX
67M c PMH of substance abuse PSH of brain tumor resection as a child BIBEMS per triage note: "verbalized using heroin. given narcan 20mg ago. arousable to verbal stimuli." pt reports sniffing heroin today, denies other ingestions or n/v, denies si/hi. On exam, RR 15, GCS 14, pt arousable to voice, no external signs of trauma.     ddx: heroin overdose vs other drug overdose vs etoh intoxication    Plan:  - labs  - utox  - covid test  - ekg  - end tidal CO2  - pulse ox  - pt refuses IV placement, agrees to butterly  - will plan to observe for 4 hours since narcan given   - reassess

## 2022-09-28 NOTE — ED PROVIDER NOTE - NSFOLLOWUPINSTRUCTIONS_ED_ALL_ED_FT
1. You were seen for overdose. A copy of any of your resulted labs, imaging, and findings have been provided to you. Make sure to view any test results that may not have yet resulted at the time of your discharge by creating a FollowMyHealth account at: [email or web address] to sign up for FollowMyHealth.   2. Continue to take your home medications as prescribed.   3. Follow up with your primary care doctor within 48 hours to assess the symptoms you were seen for in the emergency department and to review all results from your visit. If you don't have a doctor, call [phone] to make an appointment.  4. Return immediately to the emergency department for new, persistent, or worsening symptoms or signs. Return immediately to the emergency department if you have chest pain, shortness of breath, loss of consciousness, chest pain, or palpitations.   5. For your for health, you should make healthy food choices and be physically active. Also, you should not smoke or use drugs, and you should not drink alcohol in excess. Please visit [email or web address] for resources and more information.

## 2022-09-28 NOTE — ED PROVIDER NOTE - OBJECTIVE STATEMENT
67M c PMH of substance abuse PSH of brain tumor resection as a child BIBEMS per triage note: "verbalized using heroin. given narcan 20mg ago. arousable to verbal stimuli." pt states he sniffed a bag of heroin today unsure what time, denies other substance use no overdose of meds or other drugs. smokes a few cigarettes a day, denies etoh use. denies SI/HI/VH/AH. denies head trauma or injury. denies f/c/cough/cp/sob/rash/n/v/abdominal pain

## 2022-09-28 NOTE — ED ADULT NURSE NOTE - HISTORY OF COVID-19 VACCINATION
Preoperative Exam    Scheduled Procedure: Cataract  Surgery Date:  Rt 8-8-19  Lt 8-12-19  Surgery Location: [facility] Fax [phone]    Surgeon:  Dr Carcamo on right eye and Dr Metz on left eye    Assessment/Plan:     1. Preop general physical exam  Surgical risks include managed hypertension.  No additional risks or contraindications to surgery or anesthesia identified, he may proceed with surgeries as scheduled without further clinical clarification or evaluation.  Tdap administered today.    2. Cataract of both eyes, unspecified cataract type  To proceed with surgery as scheduled.    3. Essential hypertension  Adequately controlled with lisinopril.  We will check a basic metabolic panel today.    4. Anemia, unspecified type  Chronic with hemoglobin in the 13-14 range.  Will check follow-up hemogram today.    5. Gout  Recent flare.  Will check follow-up uric acid level, hemogram, and basic metabolic panel in monitoring.  Will forward results to Dr. Devine at [facility].  - Basic Metabolic Panel  - HM2(CBC w/o Differential)  - Uric Acid    6. Vitamin D deficiency  Will check vitamin D level today.  - Vitamin D, Total (25-Hydroxy)    7. Screen for colon cancer  Referral placed for follow-up colonoscopy.  - Ambulatory referral for Colonoscopy     Surgical Procedure Risk: Low (reported cardiac risk generally < 1%)  Have you had prior anesthesia?: Yes  Have you or any family members had a previous anesthesia reaction:  No  Do you or any family members have a history of a clotting or bleeding disorder?: No  Cardiac Risk Assessment: no increased risk for major cardiac complications    APPROVAL GIVEN to proceed with proposed procedure, without further diagnostic evaluation    Functional Status: Independent  Patient plans to recover at home alone.     Subjective:      Arthur Lawson is a 54 y.o. male who presents for a preoperative consultation.  Previous history of LASEK refractive surgery  with a steady decline in his vision since then.  He noted to have some cataracts on exam and therefore was told to have cataracts repaired prior to consideration of Lasix adjustment.  He otherwise has been feeling well.  History of gout, has been doing quite well on allopurinol but unfortunately had a flare last week, that seems to be settling down with ibuprofen.  He has follow-up scheduled on August 7 with Dr. Devine of rheumatology.  History of chronic anemia with hemoglobin in the 13-14 range.  History of hypertension managed with lisinopril.  He tolerates well.  History of vitamin D deficiency that is due for follow-up.  He is due for his colonoscopy.  He is agreeable to tetanus booster today.  Recent difficulty with some clogging sensation in his ears with reduced hearing, some allergies for which she takes Claritin.  We discussed that with normal ear exam today that this is likely eustachian tube dysfunction and he may treat allergies and monitor.    All other systems reviewed and are negative, other than those listed in the HPI.    Pertinent History  Do you have difficulty breathing or chest pain after walking up a flight of stairs: No  History of obstructive sleep apnea: No  Steroid use in the last 6 months: No  Frequent Aspirin/NSAID use: No  Prior Blood Transfusion: No  Prior Blood Transfusion Reaction: No  If for some reason prior to, during or after the procedure, if it is medically indicated, would you be willing to have a blood transfusion?:  There is no transfusion refusal.    Current Outpatient Medications   Medication Sig Dispense Refill     allopurinol (ZYLOPRIM) 100 MG tablet Take 100 mg by mouth 2 (two) times a day.  0     ibuprofen (ADVIL,MOTRIN) 200 MG tablet Take 200 mg by mouth every 6 (six) hours as needed for pain.       lisinopril (PRINIVIL,ZESTRIL) 5 MG tablet Take 1 tablet (5 mg total) by mouth daily. 90 tablet 3     No current facility-administered medications for this visit.          Allergies   Allergen Reactions     Hydrochlorothiazide      Gout flair       Penicillins      Polycillin        Patient Active Problem List   Diagnosis     Gout     Normochromic, Normocytic Anemia     Essential Hypertension     Tendonitis Of The Achilles Tendon      Vitamin D deficiency       Past Medical History:   Diagnosis Date     Essential Hypertension     Created by Conversion      Gout     Created by Conversion      Normochromic, Normocytic Anemia     Created by Conversion      Tendonitis Of The Achilles Tendon     Created by Conversion        Past Surgical History:   Procedure Laterality Date     FINGER SURGERY       INGUINAL HERNIA REPAIR       left knee arthroscopy       REFRACTIVE SURGERY         Social History     Socioeconomic History     Marital status:      Spouse name: Nikkie     Number of children: Not on file     Years of education: Not on file     Highest education level: Not on file   Occupational History     Occupation:    Social Needs     Financial resource strain: Not on file     Food insecurity:     Worry: Not on file     Inability: Not on file     Transportation needs:     Medical: Not on file     Non-medical: Not on file   Tobacco Use     Smoking status: Never Smoker     Smokeless tobacco: Current User     Tobacco comment: Chews.   Substance and Sexual Activity     Alcohol use: Yes     Frequency: 2-4 times a month     Comment: Socially     Drug use: No     Sexual activity: Not on file   Lifestyle     Physical activity:     Days per week: Not on file     Minutes per session: Not on file     Stress: Not on file   Relationships     Social connections:     Talks on phone: Not on file     Gets together: Not on file     Attends Judaism service: Not on file     Active member of club or organization: Not on file     Attends meetings of clubs or organizations: Not on file     Relationship status: Not on file     Intimate partner violence:     Fear of current or ex partner: Not on file  "    Emotionally abused: Not on file     Physically abused: Not on file     Forced sexual activity: Not on file   Other Topics Concern     Not on file   Social History Narrative     Not on file           Objective:     Vitals:    08/05/19 1439   BP: 120/76   Pulse: 65   Resp: 20   Temp: 98.5  F (36.9  C)   TempSrc: Oral   SpO2: 96%   Weight: 217 lb (98.4 kg)   Height: 5' 11\" (1.803 m)         Physical Exam:  Physical Examination: General appearance - alert, well appearing, and in no distress, oriented to person, place, and time and normal appearing weight  Mental status - alert, oriented to person, place, and time, normal mood, behavior, speech, dress, motor activity, and thought processes  Eyes - pupils equal and reactive, extraocular eye movements intact  Ears - bilateral TM's and external ear canals normal  Nose - normal and patent, no erythema, discharge or polyps  Mouth - mucous membranes moist, pharynx normal without lesions  Neck - supple, no significant adenopathy  Lymphatics - no palpable lymphadenopathy, no hepatosplenomegaly  Chest - clear to auscultation, no wheezes, rales or rhonchi, symmetric air entry  Heart - normal rate, regular rhythm, normal S1, S2, no murmurs, rubs, clicks or gallops  Abdomen - soft, nontender, nondistended, no masses or organomegaly  Neurological - alert, oriented, normal speech, no focal findings or movement disorder noted  Musculoskeletal - no joint tenderness, deformity or swelling  Extremities - peripheral pulses normal, mild edema left medial ankle consistent with resolving gout  Skin - normal coloration and turgor, no rashes, no suspicious skin lesions noted      There are no Patient Instructions on file for this visit.    Labs:  Labs pending at this time.  Results will be reviewed when available.    Immunization History   Administered Date(s) Administered     Td, Adult, Absorbed 07/16/1993, 01/01/2003           Electronically signed by Jessica Jones MD 08/05/19 2:34 " PM   Vaccine status unknown

## 2022-09-28 NOTE — ED ADULT NURSE NOTE - NSIMPLEMENTINTERV_GEN_ALL_ED
Implemented All Fall Risk Interventions:  [place] to call system. Call bell, personal items and telephone within reach. Instruct patient to call for assistance. Room bathroom lighting operational. Non-slip footwear when patient is off stretcher. Physically safe environment: no spills, clutter or unnecessary equipment. Stretcher in lowest position, wheels locked, appropriate side rails in place. Provide visual cue, wrist band, yellow gown, etc. Monitor gait and stability. Monitor for mental status changes and reorient to person, place, and time. Review medications for side effects contributing to fall risk. Reinforce activity limits and safety measures with patient and family.

## 2022-09-28 NOTE — ED ADULT NURSE NOTE - PUPILS PERRL
Emergency Department  [address] [place] [address]  Phone:  [phone]  Fax:  [phone]                                    Madie Silva   MRN: [id number]    Department:   Emergency Department   Date of Visit:  4/30/2020           After Visit Summary Signature Page    I have received my discharge instructions, and my questions have been answered. I have discussed any challenges I see with this plan with the nurse or doctor.    ..........................................................................................................................................  Patient/Patient Representative Signature      ..........................................................................................................................................  Patient Representative Print Name and Relationship to Patient    ..................................................               ................................................  Date                                   Time    ..........................................................................................................................................  Reviewed by Signature/Title    ...................................................              ..............................................  Date                                               Time          22EPIC Rev 08/18        yes

## 2022-09-28 NOTE — ED ADULT TRIAGE NOTE - OTHER COMPLAINTS
verbalized using heroin. given narcan 20mg ago. arousable to verbal stimuli verbalized using heroin. given narcan 20min ago. arousable to verbal stimuli

## 2022-09-28 NOTE — ED ADULT NURSE NOTE - OBJECTIVE STATEMENT
Patient presents to ED for receiving narcan s/p sniffing a bag of heroin. Patient denies any other drugs or alcohol and states that he smokes 2 cigarettes a day. Patient denies nausea, vomiting, diarrhea, LOC, head injury, pain, PMH or allergies. Patient resting in bed at this time in no acute distress, speaking in full sentences without difficulty, and easily arousable to name and touch.

## 2022-10-05 NOTE — ED PROVIDER NOTE - CROS ED ROS STATEMENT
all other ROS negative except as per HPI Doxycycline Pregnancy And Lactation Text: This medication is Pregnancy Category D and not consider safe during pregnancy. It is also excreted in breast milk but is considered safe for shorter treatment courses.

## 2022-11-12 ENCOUNTER — EMERGENCY (EMERGENCY)
Facility: HOSPITAL | Age: 67
LOS: 1 days | Discharge: HOME | End: 2022-11-12
Attending: STUDENT IN AN ORGANIZED HEALTH CARE EDUCATION/TRAINING PROGRAM | Admitting: STUDENT IN AN ORGANIZED HEALTH CARE EDUCATION/TRAINING PROGRAM
Payer: MEDICARE

## 2022-11-12 VITALS
RESPIRATION RATE: 18 BRPM | HEART RATE: 85 BPM | OXYGEN SATURATION: 100 % | HEIGHT: 71 IN | TEMPERATURE: 98 F | DIASTOLIC BLOOD PRESSURE: 76 MMHG | SYSTOLIC BLOOD PRESSURE: 150 MMHG

## 2022-11-12 DIAGNOSIS — Z87.898 PERSONAL HISTORY OF OTHER SPECIFIED CONDITIONS: Chronic | ICD-10-CM

## 2022-11-12 PROCEDURE — 99283 EMERGENCY DEPT VISIT LOW MDM: CPT

## 2022-11-12 RX ORDER — IBUPROFEN 200 MG
400 TABLET ORAL ONCE
Refills: 0 | Status: COMPLETED | OUTPATIENT
Start: 2022-11-12 | End: 2022-11-12

## 2022-11-12 RX ORDER — ACETAMINOPHEN 500 MG
650 TABLET ORAL ONCE
Refills: 0 | Status: COMPLETED | OUTPATIENT
Start: 2022-11-12 | End: 2022-11-12

## 2022-11-12 RX ADMIN — Medication 400 MILLIGRAM(S): at 21:00

## 2022-11-12 RX ADMIN — Medication 650 MILLIGRAM(S): at 21:00

## 2022-11-12 NOTE — ED PROVIDER NOTE - CLINICAL SUMMARY MEDICAL DECISION MAKING FREE TEXT BOX
68 y/o M hx of Brain tumor resection and polysubstance abuse, un-domiciled presenting with nontraumatic right foot pain  Poor hygiene but no signs of cellulitis on exam  Needs outpatient podiatry follow up for foot care  Treat symptomatically with analgesia.

## 2022-11-12 NOTE — ED PROVIDER NOTE - PHYSICAL EXAMINATION
GENERAL: disheveled; poor hygiene   HEAD:  Atraumatic, Normocephalic  EYES: EOMI, PERRLA,   ENT: MMM; oropharynx clear  NECK: Supple, No JVD  CHEST/LUNG: Clear to auscultation bilaterally; No wheeze  HEART: Regular rate and rhythm; No murmurs, rubs, or gallops  ABDOMEN: Soft, Nontender, Nondistended; Bowel sounds present  EXTREMITIES:  2+ Peripheral Pulses, poor foot hygiene with eschar on toe nails. No purulence or erythema. Large callous formations.   PSYCH: AAOx3  NEUROLOGY: 5/5 muscle strength in all extremities.   SKIN: as above

## 2022-11-12 NOTE — ED ADULT NURSE NOTE - OBJECTIVE STATEMENT
pt. presents with c/o bl foot pain and swelling, reportedly from walking without socks on, denies complaint otherwise, other than being hungry, no s/s cellulitis or injury noted

## 2022-11-12 NOTE — ED ADULT NURSE REASSESSMENT NOTE - NS ED NURSE REASSESS COMMENT FT1
pt. medicated as noted, wen. well, provided with socks, sandwich, ginger ale, awaiting final dispo., with understanding verbalized

## 2022-11-12 NOTE — ED PROVIDER NOTE - OBJECTIVE STATEMENT
68 y/o M hx of Brain tumor resection and polysubstance abuse, un-domiciled presenting with nontraumatic right foot pain    Patient un-domiciled; has poor foot hygiene; notes pain in the toes.   No fevers/chills  No trauma or ankle pain. States is 'de-toxing' from heroin. No nausea or vomiting or piloerection reported.

## 2022-11-12 NOTE — ED PROVIDER NOTE - PATIENT PORTAL LINK FT
You can access the FollowMyHealth Patient Portal offered by [facility] by registering at the following website: [email or web address]. By joining [employer]’s FollowMyHealth portal, you will also be able to view your health information using other applications (apps) compatible with our system.

## 2022-11-12 NOTE — ED PROVIDER NOTE - NSFOLLOWUPCLINICS_GEN_ALL_ED_FT
[facility]  Podiatry  [address] [place] [address]  Phone: [phone]  Fax:

## 2022-11-14 DIAGNOSIS — Z86.59 PERSONAL HISTORY OF OTHER MENTAL AND BEHAVIORAL DISORDERS: ICD-10-CM

## 2022-11-14 DIAGNOSIS — M79.671 PAIN IN RIGHT FOOT: ICD-10-CM

## 2022-11-14 DIAGNOSIS — Z87.19 PERSONAL HISTORY OF OTHER DISEASES OF THE DIGESTIVE SYSTEM: ICD-10-CM

## 2022-11-14 DIAGNOSIS — Z59.00 HOMELESSNESS UNSPECIFIED: ICD-10-CM

## 2022-11-14 SDOH — ECONOMIC STABILITY - HOUSING INSECURITY: HOMELESSNESS UNSPECIFIED: Z59.00

## 2022-12-03 NOTE — ED ADULT TRIAGE NOTE - OTHER COMPLAINTS
CC of suprabic intermittent pain L x 2 days, no urinary sx. denies fevers nor chills. not painful at the moment
Patient/Caregiver provided printed discharge information.

## 2023-02-24 NOTE — ED PROVIDER NOTE - RESPIRATORY NEGATIVE STATEMENT, MLM
no chest pain, no cough, and no shortness of breath. negative normal/soft/nontender/nondistended/normal active bowel sounds

## 2023-04-05 ENCOUNTER — EMERGENCY (EMERGENCY)
Facility: HOSPITAL | Age: 68
LOS: 1 days | Discharge: HOME | End: 2023-04-05
Attending: STUDENT IN AN ORGANIZED HEALTH CARE EDUCATION/TRAINING PROGRAM | Admitting: STUDENT IN AN ORGANIZED HEALTH CARE EDUCATION/TRAINING PROGRAM
Payer: MEDICARE

## 2023-04-05 VITALS
OXYGEN SATURATION: 96 % | DIASTOLIC BLOOD PRESSURE: 60 MMHG | RESPIRATION RATE: 16 BRPM | TEMPERATURE: 98 F | HEART RATE: 88 BPM | WEIGHT: 139.99 LBS | SYSTOLIC BLOOD PRESSURE: 101 MMHG

## 2023-04-05 DIAGNOSIS — Z87.891 PERSONAL HISTORY OF NICOTINE DEPENDENCE: ICD-10-CM
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
## 2023-04-05 DIAGNOSIS — Z87.898 PERSONAL HISTORY OF OTHER SPECIFIED CONDITIONS: Chronic | ICD-10-CM

## 2023-04-05 DIAGNOSIS — T40.1X1A POISONING BY HEROIN, ACCIDENTAL (UNINTENTIONAL), INITIAL ENCOUNTER: ICD-10-CM
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
## 2023-04-05 DIAGNOSIS — R41.82 ALTERED MENTAL STATUS, UNSPECIFIED: ICD-10-CM

## 2023-04-05 PROCEDURE — 99284 EMERGENCY DEPT VISIT MOD MDM: CPT

## 2023-04-05 PROCEDURE — 99285 EMERGENCY DEPT VISIT HI MDM: CPT

## 2023-04-05 PROCEDURE — 82962 GLUCOSE BLOOD TEST: CPT

## 2023-04-05 NOTE — ED PROVIDER NOTE - NSFOLLOWUPINSTRUCTIONS_ED_ALL_ED_FT
Follow up with your primary medical doctor as soon as possible.    Return to the emergency department if your symptoms worsen or if you develop new symptoms.  If you have any problems with followup, please call the ED Referral Coordinator at [phone].    Opioid Use Disorder  Opioid use disorder is a condition in which opioids are used for reasons other than medical care. The person may use them even though taking them hurts the person's health and well-being. These drugs are powerful substances that relieve pain. Opioids include drugs such as heroin as well as prescription medicines for pain, such as:  Codeine.  Morphine.  Hydrocodone.  Oxycodone.  Fentanyl.  Taking prescribed opioids regularly can lead to dependence, especially if you take them in larger amounts or more often than they should be taken. Opioid use disorder can lead to problems with mental and physical health, including:  Depression or anxiety.  Severe constipation.  Malnutrition and weight loss.  Sleep problems.  Diseases caused by infections, such as hepatitis or HIV.  Sexual problems.  Opioid use disorder can be dangerous. It increases the risk of suicide and can lead to a life-threatening overdose.    What are the causes?  This condition is caused by taking opioids. Taking opioids again and again results in changes in the brain that make it hard to control opioid use. Many people develop this condition because they like the way they feel when they take opioids or because they get addicted to them.    What increases the risk?  This condition is more likely to develop in people who:  Have a family history of opioid use disorder.  Misuse other drugs.  Have a mental illness, such as depression, post-traumatic stress disorder, or antisocial personality disorder.  Begin use at an early age, such as during their teenage years.  What are the signs or symptoms?  Symptoms of this condition include:  Taking opioids in larger amounts or for longer periods than you want to.  Spending an abnormal amount of time getting opioids, using them, or recovering from their effects.  Craving opioids.  Using opioids in a way that interferes with work, school, social activities, and personal relationships.  Giving up or cutting down on important life activities because of opioid use.  Using opioids when it is dangerous, such as when driving a car.  Continuing to use the drug even after it has led to problems such as:  Physical or mental health problems.  Legal or financial troubles.  Job loss.  Broken relationships.  Being unable to slow down or stop your use of the drug.  Needing more and more of an opioid to get the same effect (building up a tolerance).  Experiencing unpleasant symptoms if you do not use the opioid (withdrawal). Some symptoms of withdrawal include:  Depression, anxiety, or feeling irritable.  Nausea or vomiting.  Muscle aches or spasms.  Watery eyes.  Trouble sleeping.  Yawning.  How is this diagnosed?  This condition is diagnosed based on:  A physical exam.  Your history of opioid use.  Your symptoms. This includes:  How opioid use affects your life.  Changes in personality, behaviors, and mood.  Having at least two symptoms of opioid use disorder within a 12-month period.  Health issues related to using opioids.  Blood or urine tests to screen for drugs.  How is this treated?  Person talking with a counselor.  The first goal of treatment is to stop your use of opioids. This must be done safely and may involve taking medicines to lessen withdrawal symptoms. Treatment may also involve:  Taking part in group and individual counseling from mental health providers who have experience with substance use disorder.  Staying at a residential treatment center for several days or weeks.  Attending daily counseling sessions at a treatment center.  Taking medicines as told by your health care provider that:  Ease symptoms and prevent complications during withdrawal.  Block cravings and block the good feeling that you get from using opioids.  Treat other mental health issues, such as depression or anxiety.  Reduce agitation.  Participating in a support group to share your experience with others who are going through the same thing.  Using opioid maintenance treatment. This involves taking certain kinds of opioid medicines. These medicines satisfy cravings but are safer than opioids that are commonly misused.  Recovery can be a long process. Some people who undergo treatment start using opioids again after stopping (relapse). If you relapse, it does not mean that treatment will not work.    Follow these instructions at home:  Medicines    Take over-the-counter and prescription medicines only as told by your health care provider.  Check with your health care provider before starting any new medicines, herbs, or supplements.  General instructions    Do not use any drugs or alcohol.  Avoid people and activities that trigger your use of opioids.  Learn and practice techniques for managing stress.  Have a plan for vulnerable moments. These are times when you are most likely to relapse. Get phone numbers of those who are willing to help and who are committed to your recovery.  Attend support groups regularly. These groups provide emotional support, advice, and guidance.  Keep all follow-up visits. This is important. Follow-up visits include continuing to work with therapists and support groups.  Where to find more information  [place] on Drug Abuse: drugabuse.gov  Substance Abuse and Mental Health Services Administration: samhsa.gov  Narcotics Anonymous: na.org  Contact a health care provider if:  You cannot take your medicines as told.  Your symptoms get worse.  You have a relapse.  Get help right away if:  You may have taken too much of an opioid (overdosed). Common symptoms of an overdose include:  Sleepiness or difficulty waking from sleep.  Decrease in attention or confusion.  Slurred speech.  Slowed breathing and a slow pulse (bradycardia).  Nausea and vomiting.  Abnormally small pupils.  You have serious thoughts about hurting yourself or others.  These symptoms may represent a serious problem that is an emergency. Do not wait to see if the symptoms will go away. Get medical help right away. Call your local emergency services (068 in the [place] Do not drive yourself to the hospital.    If you were prescribed a drug (naloxone) that reverses the effects of an opioid overdose, a friend, family member, or emergency services provider can administer the drug in an emergency.    If you ever feel like you may hurt yourself or others, or have thoughts about taking your own life, get help right away. You can go to your nearest emergency department or call:  Your local emergency services (074 in the [place]  A suicide crisis helpline, such as the National Suicide Prevention Lifeline at 1-114.977.3501 or 711 in the [place] This is open 24 hours a day.  Summary  Opioid use disorder is a condition in which opioids are used for reasons other than medical care.  Opioid use disorder can be dangerous. It can lead to various mental and physical problems, and an opioid overdose can be life-threatening.  The first goal of treatment is to stop your use of opioids. This must be done safely and may involve taking medicines to lessen withdrawal symptoms.  This information is not intended to replace advice given to you by your health care provider. Make sure you discuss any questions you have with your health care provider.

## 2023-04-05 NOTE — ED ADULT TRIAGE NOTE - CHIEF COMPLAINT QUOTE
[place] the train, "I drank alcohol."  Able to relate name//location.   in triage.

## 2023-04-05 NOTE — ED PROVIDER NOTE - CLINICAL SUMMARY MEDICAL DECISION MAKING FREE TEXT BOX
S/p suspected opiate overdose, no Narcan given in the field. Patient states he did not drink since Monday. States he suspects fentanyl in the dope he snorted. No medical complaints, patient asking for food in ED.  Pt observed for sobriety. At time of discharge, patient is A&OX3, calm and cooperative, ambulatory with steady gait, tolerating PO.

## 2023-04-05 NOTE — ED ADULT NURSE NOTE - BEFAST ARM SIDE DRIFT
Call placed to mom to inform mom of xray results per Dr. Fang.  Mom states pt is worse with more diarrhea, vomiting, up all night, drinking only small sips of gatoraid.  Mom states she tried gas relief chewable but was unable to get Zofran liquid at pharmacy last night due to they had to order liquid.  Mom states she was going to go to ER or clinic today.   Per verbal order Dr. Fang:  Pt should go to ER.  Call placed back to mom and advised mom of this and informed mom that she may either go to an ER closer to her home in [place] or that patient may come to [place] [facility] where Dr. Fang may give her IV fluids.  Mom states she would prefer to go to [place] [facility] and that she is waiting for a ride then she will bring pt here.    Dr. Fang informed of above.   No

## 2023-04-05 NOTE — ED ADULT TRIAGE NOTE - WEIGHT IN KG
Writer called patient to reschedule MV02, labs, and MD/Casper RN. Patient is not accepting messages at this time. Will call back tomorrow.   63.5

## 2023-04-05 NOTE — ED PROVIDER NOTE - PATIENT PORTAL LINK FT
You can access the FollowMyHealth Patient Portal offered by [facility] by registering at the following website: [email or web address]. By joining [facility] [facility]’s FollowMyHealth portal, you will also be able to view your health information using other applications (apps) compatible with our system.

## 2023-04-05 NOTE — ED ADULT NURSE NOTE - OBJECTIVE STATEMENT
Pt aaox3 male presents to the ED after drinking alcohol and snorting a bag of dope (as per patient). Pt states he is feeling slightly short of breath with some abdominal pain, states he feels constipated and needs food. No acute distress noted.

## 2023-04-05 NOTE — ED PROVIDER NOTE - OBJECTIVE STATEMENT
66 yo M w PMH etoh abuse, BIBEMS for AMS. Patient states he was snorting what he thought was heroin when he lost consciousness briefly. He suspects it was fentanyl. No naloxone given by EMS, patient was awake and breathing when they picked him up. He denies alcohol use today. No withdrawal symptoms today. He is awake, AAOx3, eating a sandwich in the ED. He has no medical complaints.

## 2023-04-05 NOTE — ED ADULT NURSE NOTE - BEFAST ARM NUMBNESS
Patient concerned because she was not clear on visit results, upon reviewing with her she did remember the doctor stating that no abnormal findings but her concern lie with her sister in law who had a exam with her doctor and did not go back for 31 days and found out that she has cancer.  So she wanted reassurance that her exam was ok.  Asking when she should come back in for next exam.  I explained that because of her age and medicare guide lines they what her to come back every 2 years unless there is a problem she is aware that she can come in sooner.  She could come in every year but she may end up paying every other year.  She voiced understanding.  She does not get a pap smear because it's not indicated because of hysterectomy status and age over 65 guideline.  I acknowledged her concerns and thanked her for calling.  Please call back for any other concerns.    No

## 2023-04-05 NOTE — ED ADULT NURSE NOTE - NSIMPLEMENTINTERV_GEN_ALL_ED
Implemented All Fall Risk Interventions:  [place] to call system. Call bell, personal items and telephone within reach. Instruct patient to call for assistance. Room bathroom lighting operational. Non-slip footwear when patient is off stretcher. Physically safe environment: no spills, clutter or unnecessary equipment. Stretcher in lowest position, wheels locked, appropriate side rails in place. Provide visual cue, wrist band, yellow gown, etc. Monitor gait and stability. Monitor for mental status changes and reorient to person, place, and time. Review medications for side effects contributing to fall risk. Reinforce activity limits and safety measures with patient and family.

## 2023-04-05 NOTE — ED ADULT NURSE NOTE - BEFAST EYES
Telephone Encounter by Melva Henriquez CMA at 06/09/17 09:46 AM     Author:  Melva Henriquez CMA Service:  (none) Author Type:  Certified Medical Assistant     Filed:  06/09/17 09:46 AM Encounter Date:  6/7/2017 Status:  Signed     :  Melva Henriquez CMA (Certified Medical Assistant)            Patient has appt on 6-26-17 in Dermatology Dept.[CH1.1M]      Revision History        User Key Date/Time User Provider Type Action    > CH1.1 06/09/17 09:46 AM Melva Henriquez CMA Certified Medical Assistant Sign    M - Manual             No

## 2023-04-10 ENCOUNTER — EMERGENCY (EMERGENCY)
Facility: HOSPITAL | Age: 68
LOS: 1 days | Discharge: HOME | End: 2023-04-10
Attending: EMERGENCY MEDICINE | Admitting: EMERGENCY MEDICINE
Payer: MEDICARE

## 2023-04-10 VITALS
WEIGHT: 134.92 LBS | OXYGEN SATURATION: 94 % | SYSTOLIC BLOOD PRESSURE: 125 MMHG | DIASTOLIC BLOOD PRESSURE: 83 MMHG | HEART RATE: 54 BPM | RESPIRATION RATE: 18 BRPM | TEMPERATURE: 97 F

## 2023-04-10 DIAGNOSIS — Z87.898 PERSONAL HISTORY OF OTHER SPECIFIED CONDITIONS: Chronic | ICD-10-CM

## 2023-04-10 LAB
ALBUMIN SERPL ELPH-MCNC: 4 G/DL — SIGNIFICANT CHANGE UP (ref 3.3–5)
ALP SERPL-CCNC: 80 U/L — SIGNIFICANT CHANGE UP (ref 40–120)
ALT FLD-CCNC: SIGNIFICANT CHANGE UP (ref 10–45)
ANION GAP SERPL CALC-SCNC: 8 MMOL/L — SIGNIFICANT CHANGE UP (ref 5–17)
APPEARANCE UR: ABNORMAL
AST SERPL-CCNC: SIGNIFICANT CHANGE UP (ref 10–40)
BACTERIA # UR AUTO: SIGNIFICANT CHANGE UP /HPF
BASOPHILS # BLD AUTO: 0.01 K/UL — SIGNIFICANT CHANGE UP (ref 0–0.2)
BASOPHILS NFR BLD AUTO: 0.2 % — SIGNIFICANT CHANGE UP (ref 0–2)
BILIRUB SERPL-MCNC: 0.5 MG/DL — SIGNIFICANT CHANGE UP (ref 0.2–1.2)
BILIRUB UR-MCNC: NEGATIVE — SIGNIFICANT CHANGE UP
BUN SERPL-MCNC: 12 MG/DL — SIGNIFICANT CHANGE UP (ref 7–23)
CALCIUM SERPL-MCNC: 9.3 MG/DL — SIGNIFICANT CHANGE UP (ref 8.4–10.5)
CHLORIDE SERPL-SCNC: 106 MMOL/L — SIGNIFICANT CHANGE UP (ref 96–108)
CO2 SERPL-SCNC: 24 MMOL/L — SIGNIFICANT CHANGE UP (ref 22–31)
COLOR SPEC: YELLOW — SIGNIFICANT CHANGE UP
COMMENT - URINE: SIGNIFICANT CHANGE UP
CREAT SERPL-MCNC: 0.87 MG/DL — SIGNIFICANT CHANGE UP (ref 0.5–1.3)
DIFF PNL FLD: NEGATIVE — SIGNIFICANT CHANGE UP
EGFR: 93 ML/MIN/1.73M2 — SIGNIFICANT CHANGE UP
EOSINOPHIL # BLD AUTO: 0.01 K/UL — SIGNIFICANT CHANGE UP (ref 0–0.5)
EOSINOPHIL NFR BLD AUTO: 0.2 % — SIGNIFICANT CHANGE UP (ref 0–6)
EPI CELLS # UR: SIGNIFICANT CHANGE UP /HPF (ref 0–5)
GLUCOSE SERPL-MCNC: 107 MG/DL — HIGH (ref 70–99)
GLUCOSE UR QL: NEGATIVE — SIGNIFICANT CHANGE UP
GRAN CASTS # UR COMP ASSIST: SIGNIFICANT CHANGE UP /LPF
HCT VFR BLD CALC: 49.4 % — SIGNIFICANT CHANGE UP (ref 39–50)
HGB BLD-MCNC: 15.9 G/DL — SIGNIFICANT CHANGE UP (ref 13–17)
HYALINE CASTS # UR AUTO: ABNORMAL /LPF (ref 0–2)
IMM GRANULOCYTES NFR BLD AUTO: 0.3 % — SIGNIFICANT CHANGE UP (ref 0–0.9)
KETONES UR-MCNC: ABNORMAL MG/DL
LEUKOCYTE ESTERASE UR-ACNC: NEGATIVE — SIGNIFICANT CHANGE UP
LIDOCAIN IGE QN: 19 U/L — SIGNIFICANT CHANGE UP (ref 7–60)
LYMPHOCYTES # BLD AUTO: 2.01 K/UL — SIGNIFICANT CHANGE UP (ref 1–3.3)
LYMPHOCYTES # BLD AUTO: 31.5 % — SIGNIFICANT CHANGE UP (ref 13–44)
MCHC RBC-ENTMCNC: 29.6 PG — SIGNIFICANT CHANGE UP (ref 27–34)
MCHC RBC-ENTMCNC: 32.2 GM/DL — SIGNIFICANT CHANGE UP (ref 32–36)
MCV RBC AUTO: 92 FL — SIGNIFICANT CHANGE UP (ref 80–100)
MONOCYTES # BLD AUTO: 0.28 K/UL — SIGNIFICANT CHANGE UP (ref 0–0.9)
MONOCYTES NFR BLD AUTO: 4.4 % — SIGNIFICANT CHANGE UP (ref 2–14)
NEUTROPHILS # BLD AUTO: 4.05 K/UL — SIGNIFICANT CHANGE UP (ref 1.8–7.4)
NEUTROPHILS NFR BLD AUTO: 63.4 % — SIGNIFICANT CHANGE UP (ref 43–77)
NITRITE UR-MCNC: NEGATIVE — SIGNIFICANT CHANGE UP
NRBC # BLD: 0 /100 WBCS — SIGNIFICANT CHANGE UP (ref 0–0)
PH UR: 5.5 — SIGNIFICANT CHANGE UP (ref 5–8)
PLATELET # BLD AUTO: 173 K/UL — SIGNIFICANT CHANGE UP (ref 150–400)
POTASSIUM SERPL-MCNC: SIGNIFICANT CHANGE UP (ref 3.5–5.3)
POTASSIUM SERPL-SCNC: SIGNIFICANT CHANGE UP (ref 3.5–5.3)
PROT SERPL-MCNC: 8 G/DL — SIGNIFICANT CHANGE UP (ref 6–8.3)
PROT UR-MCNC: 30 MG/DL
RBC # BLD: 5.37 M/UL — SIGNIFICANT CHANGE UP (ref 4.2–5.8)
RBC # FLD: 13.4 % — SIGNIFICANT CHANGE UP (ref 10.3–14.5)
RBC CASTS # UR COMP ASSIST: < 5 /HPF — SIGNIFICANT CHANGE UP
SODIUM SERPL-SCNC: 138 MMOL/L — SIGNIFICANT CHANGE UP (ref 135–145)
SP GR SPEC: >=1.03 — SIGNIFICANT CHANGE UP (ref 1–1.03)
UROBILINOGEN FLD QL: 0.2 E.U./DL — SIGNIFICANT CHANGE UP
WBC # BLD: 6.38 K/UL — SIGNIFICANT CHANGE UP (ref 3.8–10.5)
WBC # FLD AUTO: 6.38 K/UL — SIGNIFICANT CHANGE UP (ref 3.8–10.5)
WBC UR QL: < 5 /HPF — SIGNIFICANT CHANGE UP

## 2023-04-10 PROCEDURE — 36415 COLL VENOUS BLD VENIPUNCTURE: CPT

## 2023-04-10 PROCEDURE — 81001 URINALYSIS AUTO W/SCOPE: CPT

## 2023-04-10 PROCEDURE — 99284 EMERGENCY DEPT VISIT MOD MDM: CPT

## 2023-04-10 PROCEDURE — 96374 THER/PROPH/DIAG INJ IV PUSH: CPT

## 2023-04-10 PROCEDURE — 99284 EMERGENCY DEPT VISIT MOD MDM: CPT | Mod: 25

## 2023-04-10 PROCEDURE — 80053 COMPREHEN METABOLIC PANEL: CPT

## 2023-04-10 PROCEDURE — 83690 ASSAY OF LIPASE: CPT

## 2023-04-10 PROCEDURE — 85025 COMPLETE CBC W/AUTO DIFF WBC: CPT

## 2023-04-10 RX ORDER — KETOROLAC TROMETHAMINE 30 MG/ML
15 SYRINGE (ML) INJECTION ONCE
Refills: 0 | Status: DISCONTINUED | OUTPATIENT
Start: 2023-04-10 | End: 2023-04-10

## 2023-04-10 RX ADMIN — Medication 15 MILLIGRAM(S): at 17:01

## 2023-04-10 NOTE — ED PROVIDER NOTE - CLINICAL SUMMARY MEDICAL DECISION MAKING FREE TEXT BOX
67-year-old male with past medical history of  alcohol and polysubstance abuse complaining of abdominal discomfort intermittently x2 days.  Associated constipation.  Last bowel movement was this morning but he has to strain.  States pain feels similar to when he is in withdrawal however used heroin yesterday.  Did not use today.  Seen on 4/5 for overdose.  Denies fever, chills, nausea, vomiting, urinary symptoms, black or bloody stools, history of abdominal surgeries. VSs. Abd soft, nt, nd. Pain/constipation likely secondary to heroin abuse, do not suspect acute pathology.

## 2023-04-10 NOTE — ED PROVIDER NOTE - OBJECTIVE STATEMENT
67-year-old male with past medical history of  alcohol and polysubstance abuse complaining of abdominal discomfort intermittently x2 days.  Associated constipation.  Last bowel movement was this morning but he has to strain.  States pain feels similar to when he is in withdrawal however used heroin yesterday.  Did not use today.  Seen on 4/5 for overdose.  Denies fever, chills, nausea, vomiting, urinary symptoms, black or bloody stools, history of abdominal surgeries.

## 2023-04-10 NOTE — ED PROVIDER NOTE - ATTENDING APP SHARED VISIT CONTRIBUTION OF CARE
Vitals wnl, exam as above. K/AST/ALT hemolyzed, other labs grossly wnl. UA no infection. Given toradol, pain improved. Pt remains well appearing, tolerating PO, benign abdomen.   Discussed importance of outpt follow up and return precautions. Clinically no indication for further emergent ED workup or hospitalization at this time. Stable for dc, outpt f/u.

## 2023-04-10 NOTE — ED PROVIDER NOTE - PHYSICAL EXAMINATION
CONSTITUTIONAL: NAD   HEAD: Normocephalic; atraumatic.   EYES: PERRL; EOM intact; conjunctiva and sclera clear  ENT: normal nose; no rhinorrhea; normal pharynx with no erythema or lesions.   NECK: Supple; non-tender; no LAD  CARDIOVASCULAR: Normal S1, S2; No audible murmurs. Regular rate and rhythm.   RESPIRATORY: Breathing easily; breath sounds clear and equal bilaterally; no wheezes, rhonchi, or rales.  GI: Soft; non-distended; non-tender; no palpable organomegaly.   MSK: FROM at all extremities, normal tone   EXT: No cyanosis or edema; N/V intact  SKIN: Normal for age and race; warm; dry; good turgor; no apparent lesions or rash.   NEURO: A & O x 3; face symmetric; grossly unremarkable.   PSYCHOLOGICAL: The patient’s mood and manner are appropriate.

## 2023-04-10 NOTE — ED ADULT NURSE NOTE - CINV DISCH TEACH PARTICIP
HPI:   Isabela Nieto is a 65year old female who presents for a Medicare Subsequent Annual Wellness visit (Pt already had Initial Annual Wellness)and 6-month f/u chronic medical conditions as follows:    1.  Uncontrolled DM2 w/ hyperglycemia - Last A1c Team:  Stella Wild MD as PCP - General (Internal Medicine)  Clarissa Charlton as Consulting Physician (OPHTHALMOLOGY)    Patient Active Problem List:     Hypercholesterolemia     Benign essential HTN     Mammogram declined     Refused Streptococcus pneum tobacco. She reports that she does not drink alcohol or use drugs.      REVIEW OF SYSTEMS:   GENERAL: feels well otherwise  SKIN: denies any unusual skin lesions  EYES: denies blurred vision or double vision  HEENT: denies nasal congestion, sinus pain or ST gallop   Abdomen:   Soft, non-tender, bowel sounds active all four quadrants,  no masses, no organomegaly   Pelvic: Deferred   Extremities: Extremities normal, atraumatic, no cyanosis or edema; Bilateral barefoot skin diabetic exam is normal, visualized fe declined    Colonoscopy refused    Other orders  -     Cancel: [facility] KODY 2D+3D SCREENING BILAT (CPT=77067/18454);  Future  -     Cancel: FLU VACC HIGH DOSE PRSV FREE  -     Triamcinolone Acetonide 0.025 % External Ointment; APPLY  OINTMENT TOPICALLY TO AFFEC Date Value   03/18/2019 85     LDL CHOLESTROL (mg/dL)   Date Value   03/28/2014 77        EKG - w/ Initial Preventative Physical Exam only, or if medically necessary Electrocardiogram date       Colorectal Cancer Screening      Colonoscopy Screen every 1 concentrates   Clients of institutions for the mentally retarded   Persons who live in the same house as a HepB virus carrier   Homosexual men   Illicit injectable drug abusers     Tetanus Toxoid  Only covered with a cut with metal- TD and TDaP Not covered Patient

## 2023-04-10 NOTE — ED PROVIDER NOTE - PATIENT PORTAL LINK FT
You can access the FollowMyHealth Patient Portal offered by [facility] by registering at the following website: [email or web address]. By joining [email or web address]’s FollowMyHealth portal, you will also be able to view your health information using other applications (apps) compatible with our system.

## 2023-04-10 NOTE — ED PROVIDER NOTE - NSFOLLOWUPINSTRUCTIONS_ED_ALL_ED_FT
Take a daily stool softener.  Avoid heroin use, Return to ED for worsening pain, fever, chills, nausea, vomiting, inability to have a bowel movement or any other concerning symptoms. Take a daily stool softener.  Avoid heroin use, Return to ED for worsening pain, fever, chills, nausea, vomiting, inability to have a bowel movement or any other concerning symptoms.    Abdominal Pain    Many things can cause abdominal pain. Many times, abdominal pain is not caused by a disease and will improve without treatment. Your health care provider will do a physical exam to determine if there is a dangerous cause of your pain; blood tests and imaging may help determine the cause of your pain. However, in many cases, no cause may be found and you may need further testing as an outpatient. Monitor your abdominal pain for any changes.     SEEK IMMEDIATE MEDICAL CARE IF YOU HAVE ANY OF THE FOLLOWING SYMPTOMS: worsening abdominal pain, uncontrollable vomiting, profuse diarrhea, inability to have bowel movements or pass gas, black or bloody stools, fever accompanying chest pain or back pain, or fainting. These symptoms may represent a serious problem that is an emergency. Do not wait to see if the symptoms will go away. Get medical help right away. Call 911 and do not drive yourself to the hospital.    Constipation    Constipation is when a person has fewer than three bowel movements a week, has difficulty having a bowel movement, or has stools that are dry, hard, or larger than normal. Other symptoms can include abdominal pain or bloating. As people grow older, constipation is more common. A low-fiber diet, not taking in enough fluids, and taking certain medicines, including opioid painkillers, may make constipation worse. Treatment varies but may include dietary modifications (more fiber-rich foods), lifestyle modifications, and possible medications.     SEEK IMMEDIATE MEDICAL CARE IF YOU HAVE ANY OF THE FOLLOWING SYMPTOMS: bright red blood in your stool, constipation for longer than 4 days, abdominal or rectal pain, unexplained weight loss, or inability to pass gas.

## 2023-04-12 DIAGNOSIS — R10.9 UNSPECIFIED ABDOMINAL PAIN: ICD-10-CM

## 2023-04-12 DIAGNOSIS — Z86.59 PERSONAL HISTORY OF OTHER MENTAL AND BEHAVIORAL DISORDERS: ICD-10-CM

## 2023-04-12 DIAGNOSIS — K59.00 CONSTIPATION, UNSPECIFIED: ICD-10-CM

## 2023-05-30 NOTE — ED ADULT NURSE NOTE - NS ED NOTE ABUSE SUSPICION NEGLECT YN
Detail Level: Zone
Render In Strict Bullet Format?: No
Initiate Treatment: clindamycin 1 % lotion : Apply to affected areas under arms, groin, and under breast x3 days a week
No
Initiate Treatment: benzoyl peroxide 10 % topical cleanser : Cleanse face BID\\nClindamycin lotion bid to cleansed face
Samples Given: Spot treat active breakouts with onexton

## 2023-05-31 ENCOUNTER — EMERGENCY (EMERGENCY)
Facility: HOSPITAL | Age: 68
LOS: 1 days | Discharge: HOME | End: 2023-05-31
Attending: STUDENT IN AN ORGANIZED HEALTH CARE EDUCATION/TRAINING PROGRAM | Admitting: STUDENT IN AN ORGANIZED HEALTH CARE EDUCATION/TRAINING PROGRAM
Payer: MEDICARE

## 2023-05-31 VITALS
SYSTOLIC BLOOD PRESSURE: 105 MMHG | OXYGEN SATURATION: 96 % | RESPIRATION RATE: 16 BRPM | TEMPERATURE: 98 F | HEART RATE: 62 BPM | DIASTOLIC BLOOD PRESSURE: 72 MMHG

## 2023-05-31 DIAGNOSIS — Z87.898 PERSONAL HISTORY OF OTHER SPECIFIED CONDITIONS: Chronic | ICD-10-CM

## 2023-05-31 PROCEDURE — 99053 MED SERV 10PM-8AM 24 HR FAC: CPT

## 2023-05-31 PROCEDURE — 99284 EMERGENCY DEPT VISIT MOD MDM: CPT

## 2023-05-31 RX ORDER — BUPRENORPHINE AND NALOXONE 2; .5 MG/1; MG/1
1 TABLET SUBLINGUAL ONCE
Refills: 0 | Status: DISCONTINUED | OUTPATIENT
Start: 2023-05-31 | End: 2023-05-31

## 2023-05-31 NOTE — ED ADULT NURSE NOTE - OBJECTIVE STATEMENT
Received via stretcher BIBEMS with chief complaints of altered mental status. Pt states "I don't feel good. I used heroin today."   Patient AOX4, follows commands, speaking full sentences. Resps even and nonlabored. Moves all extremities. No obvious trauma/injury/deformity noted. Patient oriented to ED area. All needs attended. POC reviewed. Purposeful proactive hourly rounding in progress.

## 2023-05-31 NOTE — ED ADULT TRIAGE NOTE - CHIEF COMPLAINT QUOTE
Pt brought in by EMS stating, "I'm feeling too good." Reports he used heroin today. Awake and alert, answering questions and following commands.

## 2023-06-01 VITALS
DIASTOLIC BLOOD PRESSURE: 80 MMHG | SYSTOLIC BLOOD PRESSURE: 122 MMHG | HEART RATE: 65 BPM | TEMPERATURE: 98 F | RESPIRATION RATE: 16 BRPM | OXYGEN SATURATION: 98 %

## 2023-06-01 PROCEDURE — 99285 EMERGENCY DEPT VISIT HI MDM: CPT

## 2023-06-01 PROCEDURE — 82962 GLUCOSE BLOOD TEST: CPT

## 2023-06-01 RX ORDER — BUPRENORPHINE AND NALOXONE 2; .5 MG/1; MG/1
1 TABLET SUBLINGUAL
Qty: 1 | Refills: 0
Start: 2023-06-01 | End: 2023-06-14

## 2023-06-01 RX ADMIN — BUPRENORPHINE AND NALOXONE 1 FILM(S): 2; .5 TABLET SUBLINGUAL at 00:56

## 2023-06-01 NOTE — ED PROVIDER NOTE - PATIENT PORTAL LINK FT
You can access the FollowMyHealth Patient Portal offered by [facility] by registering at the following website: [email or web address]. By joining [employer]’s FollowMyHealth portal, you will also be able to view your health information using other applications (apps) compatible with our system.

## 2023-06-01 NOTE — ED PROVIDER NOTE - CLINICAL SUMMARY MEDICAL DECISION MAKING FREE TEXT BOX
Pt arrived s/p naloxone IN. No evidence of withdrawal. Pupils pinpoint. Breathing spontaneously at rate of 16 bmp.  Will montior closely.  Will reassess after a period of metabolism; if nadeen and without emergent complaint or finding, will be safe for discharge.  Pt observed for sobriety. At time of discharge, patient is A&OX3, calm and cooperative, ambulatory with steady gait, tolerating PO.  Pt interested in suboxone. No longer on methadone. Will give Rx and outpt f/u. Pt arrived s/p naloxone IN. No evidence of withdrawal. Pupils pinpoint. Breathing spontaneously at rate of 16 bmp.  Will monitor closely.  Will reassess after a period of metabolism; if nadeen and without emergent complaint or finding, will be safe for discharge.  Pt observed for sobriety. At time of discharge, patient is A&OX3, calm and cooperative, ambulatory with steady gait, tolerating PO.  Pt interested in suboxone. No longer on methadone. Will give Rx and outpt f/u.

## 2023-06-01 NOTE — ED PROVIDER NOTE - PHYSICAL EXAMINATION
CONSTITUTIONAL: Well-nourished; no apparent distress.  HEAD: Normocephalic; no evidence of head trauma by inspection.  EYES: Pupils pinpoint bl; EOMI; sclera anicteric.  ENT: Normal pharynx; mucous membranes pink/moist, no erythema.  NECK: Supple; no meningeal signs  CARD: Regular rate and rhythm; normal S1 and S2; no murmurs, rubs or gallops.  RESP: Respiratory rate and effort are normal; breath sounds clear and equal bilaterally.  ABD: Non-distended; normal bowel sounds; soft; non-tender; no masses; no palpable organomegaly.  MSK/EXT: No gross deformities; full range of motion.  SKIN: Warm and dry; normal for age and race.  NEURO: Moving all four

## 2023-06-01 NOTE — ED ADULT NURSE REASSESSMENT NOTE - NS ED NURSE REASSESS COMMENT FT1
Suboxone not available at this time. Per pharmacist, looking for medication in other floors to call ER once medication available.

## 2023-06-01 NOTE — ED PROVIDER NOTE - OBJECTIVE STATEMENT
66 yo M w OUD presents s/p OD, received IN naloxone from EMS. Pt is AAOx4, no signs of wd. Pinpoint pupils. Breathing 16 bpm. States he feels well. Pt denies SI, HI, or AVH. No alcohol use. No medical complaints.

## 2023-06-01 NOTE — ED PROVIDER NOTE - NSFOLLOWUPINSTRUCTIONS_ED_ALL_ED_FT
Follow up with your primary medical doctor as soon as possible.    Take Suboxone 8 mg sublingual films, one film every 12 hours.  Follow up at the [facility] Suboxone clinic [address], [place] [address]  [phone]  Hours: Weekdays 8 AM to 6 PM    Return to the emergency department if your symptoms worsen or if you develop new symptoms.    Buprenorphine; Naloxone Sublingual Tablets  What is this medication?  BUPRENORPHINE; NALOXONE (byoo pre NOR feen; nal OX one) treats opioid use disorder. Buprenorphine works by reducing withdrawal symptoms and cravings to use opioids. Naloxone supports proper use of this medication. It is most effective when used in combination with counseling and behavior therapy.    This medicine may be used for other purposes; ask your health care provider or pharmacist if you have questions.    COMMON BRAND NAME(S): Suboxone, Zubsolv    What should I tell my care team before I take this medication?  They need to know if you have any of these conditions:    Brain tumor  Dental or gum disease  Drug abuse or addiction  Gallbladder disease  Head injury  Heart disease  If you often drink alcohol  Irregular heartbeat or rhythm  Liver disease  Low adrenal gland function  Lung disease, asthma, or breathing problem  Mouth sores  Pancreatic disease  Seizures  Stomach or intestine problems  Taken an MAOI like Marplan, Nardil, or Parnate in the last 14 days  An unusual or allergic reaction to buprenorphine, naloxone, other medications, foods, dyes, or preservatives  Pregnant or trying to get pregnant  Breast-feeding  How should I use this medication?  Take this medication by mouth. Take it as directed on the prescription label at the same time every day. You do not need water to take this medication. The medication is placed under the tongue and allowed to dissolve. Be sure to take the right dose. If you take more than 2 tablets for your dose, you may put all the tablets at once under the tongue. If this is not comfortable, place 2 tablets at a time under the tongue. Allow the tablets to completely dissolve. Do not cut, chew, or swallow the tablets. Keep taking it unless your care team tells you to stop.    A special MedGuide will be given to you by the pharmacist with each prescription and refill. Be sure to read this information carefully each time.    Talk to your care team about the use of this medication in children. While this medication may be prescribed for children as young as 16 years of age for selected conditions, precautions do apply.    Overdosage: If you think you have taken too much of this medicine contact a poison control center or emergency room at once.    NOTE: This medicine is only for you. Do not share this medicine with others.    What if I miss a dose?  If you miss a dose, take it as soon as you can. If it is almost time for your next dose, take only that dose. Do not take double or extra doses.    What may interact with this medication?  Do not take this medication with any of the following:    Cisapride  Dronedarone  Pimozide  Safinamide  Samidorphan  Thioridazine  This medication may also interact with the following:    Alcohol  Antihistamines for allergy, cough and cold  Certain antibiotics like clarithromycin, erythromycin  Certain antivirals for hepatitis or HIV  Certain medications for anxiety or sleep  Certain medications for bladder problems like oxybutynin, tolterodine  Certain medications for depression or psychotic disorders  Certain medications for fungal infections like fluconazole, ketoconazole, posaconazole  Certain medications for migraine headache like almotriptan, eletriptan, frovatriptan, naratriptan, rizatriptan, sumatriptan, zolmitriptan  Certain medications for nausea or vomiting like dolasetron, ondansetron, palonosetron  Certain medications for Parkinson's disease like benztropine, trihexyphenidyl  Certain medications for seizures like carbamazepine, phenobarbital, phenytoin  Certain medications for stomach problems like dicyclomine, hyoscyamine  Certain medications for travel sickness like scopolamine  Diuretics  General anesthetics like halothane, isoflurane, methoxyflurane, propofol  Ipratropium  Linezolid  MAOIs like Carbex, Eldepryl, Marplan, Nardil, and Parnate  Medications that relax muscles like cyclobenzaprine, metaxalone  Methylene blue (injected into a vein)  Other medications that prolong the QT interval (cause an abnormal heart rhythm)  Other narcotic medications for pain or cough  Phenothiazines like chlorpromazine, prochlorperazine  Rifampin  This list may not describe all possible interactions. Give your health care provider a list of all the medicines, herbs, non-prescription drugs, or dietary supplements you use. Also tell them if you smoke, drink alcohol, or use illegal drugs. Some items may interact with your medicine.    What should I watch for while using this medication?  Tell your care team if your pain does not go away, if it gets worse, or if you have new or a different type of pain. You may develop tolerance to this medication. Tolerance means that you will need a higher dose of the medication for pain relief. Tolerance is normal and is expected if you take this medication for a long time.    Taking this medication with other substances that cause drowsiness, such as alcohol, benzodiazepines, or other opioids can cause serious side effects. Give your care team a list of all medications you use. They will tell you how much medication to take. Do not take more medication than directed. Call emergency services if you have problems breathing or staying awake.    Long term use of this medication may cause your brain and body to depend on it. This can happen even when used as directed by your care team. You and your care team will work together to determine how long you will need to take this medication. If your care team wants you to stop this medication, the dose will be slowly lowered over time to reduce the risk of side effects.    Naloxone is an emergency medication used for an opioid overdose. An overdose can happen if you take too much of an opioid. It can also happen if an opioid is taken with some other medications or substances such as alcohol. Know the symptoms of an overdose, such as trouble breathing, unusually tired or sleepy, or not being able to respond or wake up. Make sure to tell caregivers and close contacts where your naloxone is stored. Make sure they know how to use it. After naloxone is given, the person giving it must call emergency services. Naloxone is a temporary treatment. Repeat doses may be needed.    This medication may affect your coordination, reaction time, or judgment. Do not drive or operate machinery until you know how this medication affects you. Sit up or stand slowly to reduce the risk of dizzy or fainting spells. Drinking alcohol with this medication can increase the risk of these side effects.    Wear a medical ID bracelet or chain. Carry a card that describes your condition. List the medications and doses you take on the card.    Tell your dentist that you are taking this medication. Take good care of your teeth while on this medication. Make sure you see your dentist for regular follow-up appointments.    This medication will cause constipation. If you do not have a bowel movement for 3 days, call your care team.    Your mouth may get dry. Chewing sugarless gum or sucking hard candy and drinking plenty of water may help. Contact your care team if the problem does not go away or is severe.    Talk to your care team if you are pregnant or think you might be pregnant. This medication can cause serious birth defects if taken during pregnancy. Prolonged use of this medication during pregnancy can cause withdrawal in a .    Talk to your care team before breastfeeding. Changes to your treatment plan may be needed. If you breastfeed while taking this medication, seek medical care right away if you notice the child has slow or noisy breathing, is unusually sleepy or not able to wake up, or is limp.    Long-term use of this medication may cause infertility. Talk to your care team if you are concerned about your fertility.    What side effects may I notice from receiving this medication?  Side effects that you should report to your care team as soon as possible:    Allergic reactions—skin rash, itching, hives, swelling of the face, lips, tongue, or throat  CNS depression—slow or shallow breathing, shortness of breath, feeling faint, dizziness, confusion, trouble staying awake  Liver injury—right upper belly pain, loss of appetite, nausea, light-colored stool, dark yellow or brown urine, yellowing skin or eyes, unusual weakness or fatigue  Low adrenal gland function—nausea, vomiting, loss of appetite, unusual weakness or fatigue, dizziness  Low blood pressure—dizziness, feeling faint or lightheaded, blurry vision  Side effects that usually do not require medical attention (report to your care team if they continue or are bothersome):    Constipation  Dizziness  Drowsiness  Dry mouth  Headache  Nausea  Vomiting  This list may not describe all possible side effects. Call your doctor for medical advice about side effects. You may report side effects to FDA at 6-324-FDA-6820.    Where should I keep my medication?  Keep out of the reach of children and pets. This medication can be abused. Keep it in a safe place to protect it from theft. Do not share it with anyone. It is only for you. Selling or giving away this medication is dangerous and against the law.    Store at room temperature between 20 and 25 degrees C (68 and 77 degrees F). Get rid of any unused medication after the expiration date.    This medication may cause harm and death if it is taken by other adults, children, or pets. It is important to get rid of the medication as soon as you no longer need it or it is . You can do this in two ways:    Take the medication to a medication take-back program. Check with your pharmacy or law enforcement to find a location.  If you cannot return the medication, flush it down the toilet.  NOTE: This sheet is a summary. It may not cover all possible information. If you have questions about this medicine, talk to your doctor, pharmacist, or health care provider. Take Suboxone 8 mg sublingual films, one film every 12 hours.  Follow up at the [facility] Suboxone clinic [address], [place] [address]  [phone]  Hours: Weekdays 8 AM to 6 PM    Follow up with your primary medical doctor as soon as possible.    Return to the emergency department if your symptoms worsen or if you develop new symptoms.    Buprenorphine; Naloxone Sublingual Tablets  What is this medication?  BUPRENORPHINE; NALOXONE (byoo pre NOR feen; nal OX one) treats opioid use disorder. Buprenorphine works by reducing withdrawal symptoms and cravings to use opioids. Naloxone supports proper use of this medication. It is most effective when used in combination with counseling and behavior therapy.    This medicine may be used for other purposes; ask your health care provider or pharmacist if you have questions.    COMMON BRAND NAME(S): Suboxone, Zubsolv    What should I tell my care team before I take this medication?  They need to know if you have any of these conditions:    Brain tumor  Dental or gum disease  Drug abuse or addiction  Gallbladder disease  Head injury  Heart disease  If you often drink alcohol  Irregular heartbeat or rhythm  Liver disease  Low adrenal gland function  Lung disease, asthma, or breathing problem  Mouth sores  Pancreatic disease  Seizures  Stomach or intestine problems  Taken an MAOI like Marplan, Nardil, or Parnate in the last 14 days  An unusual or allergic reaction to buprenorphine, naloxone, other medications, foods, dyes, or preservatives  Pregnant or trying to get pregnant  Breast-feeding  How should I use this medication?  Take this medication by mouth. Take it as directed on the prescription label at the same time every day. You do not need water to take this medication. The medication is placed under the tongue and allowed to dissolve. Be sure to take the right dose. If you take more than 2 tablets for your dose, you may put all the tablets at once under the tongue. If this is not comfortable, place 2 tablets at a time under the tongue. Allow the tablets to completely dissolve. Do not cut, chew, or swallow the tablets. Keep taking it unless your care team tells you to stop.    A special MedGuide will be given to you by the pharmacist with each prescription and refill. Be sure to read this information carefully each time.    Talk to your care team about the use of this medication in children. While this medication may be prescribed for children as young as 16 years of age for selected conditions, precautions do apply.    Overdosage: If you think you have taken too much of this medicine contact a poison control center or emergency room at once.    NOTE: This medicine is only for you. Do not share this medicine with others.    What if I miss a dose?  If you miss a dose, take it as soon as you can. If it is almost time for your next dose, take only that dose. Do not take double or extra doses.    What may interact with this medication?  Do not take this medication with any of the following:    Cisapride  Dronedarone  Pimozide  Safinamide  Samidorphan  Thioridazine  This medication may also interact with the following:    Alcohol  Antihistamines for allergy, cough and cold  Certain antibiotics like clarithromycin, erythromycin  Certain antivirals for hepatitis or HIV  Certain medications for anxiety or sleep  Certain medications for bladder problems like oxybutynin, tolterodine  Certain medications for depression or psychotic disorders  Certain medications for fungal infections like fluconazole, ketoconazole, posaconazole  Certain medications for migraine headache like almotriptan, eletriptan, frovatriptan, naratriptan, rizatriptan, sumatriptan, zolmitriptan  Certain medications for nausea or vomiting like dolasetron, ondansetron, palonosetron  Certain medications for Parkinson's disease like benztropine, trihexyphenidyl  Certain medications for seizures like carbamazepine, phenobarbital, phenytoin  Certain medications for stomach problems like dicyclomine, hyoscyamine  Certain medications for travel sickness like scopolamine  Diuretics  General anesthetics like halothane, isoflurane, methoxyflurane, propofol  Ipratropium  Linezolid  MAOIs like Carbex, Eldepryl, Marplan, Nardil, and Parnate  Medications that relax muscles like cyclobenzaprine, metaxalone  Methylene blue (injected into a vein)  Other medications that prolong the QT interval (cause an abnormal heart rhythm)  Other narcotic medications for pain or cough  Phenothiazines like chlorpromazine, prochlorperazine  Rifampin  This list may not describe all possible interactions. Give your health care provider a list of all the medicines, herbs, non-prescription drugs, or dietary supplements you use. Also tell them if you smoke, drink alcohol, or use illegal drugs. Some items may interact with your medicine.    What should I watch for while using this medication?  Tell your care team if your pain does not go away, if it gets worse, or if you have new or a different type of pain. You may develop tolerance to this medication. Tolerance means that you will need a higher dose of the medication for pain relief. Tolerance is normal and is expected if you take this medication for a long time.    Taking this medication with other substances that cause drowsiness, such as alcohol, benzodiazepines, or other opioids can cause serious side effects. Give your care team a list of all medications you use. They will tell you how much medication to take. Do not take more medication than directed. Call emergency services if you have problems breathing or staying awake.    Long term use of this medication may cause your brain and body to depend on it. This can happen even when used as directed by your care team. You and your care team will work together to determine how long you will need to take this medication. If your care team wants you to stop this medication, the dose will be slowly lowered over time to reduce the risk of side effects.    Naloxone is an emergency medication used for an opioid overdose. An overdose can happen if you take too much of an opioid. It can also happen if an opioid is taken with some other medications or substances such as alcohol. Know the symptoms of an overdose, such as trouble breathing, unusually tired or sleepy, or not being able to respond or wake up. Make sure to tell caregivers and close contacts where your naloxone is stored. Make sure they know how to use it. After naloxone is given, the person giving it must call emergency services. Naloxone is a temporary treatment. Repeat doses may be needed.    This medication may affect your coordination, reaction time, or judgment. Do not drive or operate machinery until you know how this medication affects you. Sit up or stand slowly to reduce the risk of dizzy or fainting spells. Drinking alcohol with this medication can increase the risk of these side effects.    Wear a medical ID bracelet or chain. Carry a card that describes your condition. List the medications and doses you take on the card.    Tell your dentist that you are taking this medication. Take good care of your teeth while on this medication. Make sure you see your dentist for regular follow-up appointments.    This medication will cause constipation. If you do not have a bowel movement for 3 days, call your care team.    Your mouth may get dry. Chewing sugarless gum or sucking hard candy and drinking plenty of water may help. Contact your care team if the problem does not go away or is severe.    Talk to your care team if you are pregnant or think you might be pregnant. This medication can cause serious birth defects if taken during pregnancy. Prolonged use of this medication during pregnancy can cause withdrawal in a .    Talk to your care team before breastfeeding. Changes to your treatment plan may be needed. If you breastfeed while taking this medication, seek medical care right away if you notice the child has slow or noisy breathing, is unusually sleepy or not able to wake up, or is limp.    Long-term use of this medication may cause infertility. Talk to your care team if you are concerned about your fertility.    What side effects may I notice from receiving this medication?  Side effects that you should report to your care team as soon as possible:    Allergic reactions—skin rash, itching, hives, swelling of the face, lips, tongue, or throat  CNS depression—slow or shallow breathing, shortness of breath, feeling faint, dizziness, confusion, trouble staying awake  Liver injury—right upper belly pain, loss of appetite, nausea, light-colored stool, dark yellow or brown urine, yellowing skin or eyes, unusual weakness or fatigue  Low adrenal gland function—nausea, vomiting, loss of appetite, unusual weakness or fatigue, dizziness  Low blood pressure—dizziness, feeling faint or lightheaded, blurry vision  Side effects that usually do not require medical attention (report to your care team if they continue or are bothersome):    Constipation  Dizziness  Drowsiness  Dry mouth  Headache  Nausea  Vomiting  This list may not describe all possible side effects. Call your doctor for medical advice about side effects. You may report side effects to FDA at 8-409-FDA-2096.    Where should I keep my medication?  Keep out of the reach of children and pets. This medication can be abused. Keep it in a safe place to protect it from theft. Do not share it with anyone. It is only for you. Selling or giving away this medication is dangerous and against the law.    Store at room temperature between 20 and 25 degrees C (68 and 77 degrees F). Get rid of any unused medication after the expiration date.    This medication may cause harm and death if it is taken by other adults, children, or pets. It is important to get rid of the medication as soon as you no longer need it or it is . You can do this in two ways:    Take the medication to a medication take-back program. Check with your pharmacy or law enforcement to find a location.  If you cannot return the medication, flush it down the toilet.  NOTE: This sheet is a summary. It may not cover all possible information. If you have questions about this medicine, talk to your doctor, pharmacist, or health care provider.

## 2023-06-04 DIAGNOSIS — Z87.891 PERSONAL HISTORY OF NICOTINE DEPENDENCE: ICD-10-CM

## 2023-06-04 DIAGNOSIS — R41.82 ALTERED MENTAL STATUS, UNSPECIFIED: ICD-10-CM

## 2023-06-04 DIAGNOSIS — T50.7X1A POISONING BY ANALEPTICS AND OPIOID RECEPTOR ANTAGONISTS, ACCIDENTAL (UNINTENTIONAL), INITIAL ENCOUNTER: ICD-10-CM

## 2023-06-22 NOTE — ED PROVIDER NOTE - PHYSICAL EXAMINATION
CONSTITUTIONAL: Well-appearing; well-nourished; in no apparent distress.   HEAD: Normocephalic; atraumatic.   EYES: PERRL; EOM intact; conjunctiva and sclera clear  ENT: normal nose; no rhinorrhea; normal pharynx with no erythema or lesions.   NECK: Supple; non-tender;   CARDIOVASCULAR: Normal S1, S2; no murmurs, rubs, or gallops. Regular rate and rhythm.   RESPIRATORY: Breathing easily; breath sounds clear and equal bilaterally; no wheezes, rhonchi, or rales.  MSK: FROM at all extremities, normal tone   EXT: No cyanosis or edema; N/V intact  SKIN: Normal for age and race; warm; dry; good turgor; no apparent lesions or rash.
not applicable

## 2023-09-06 ENCOUNTER — EMERGENCY (EMERGENCY)
Facility: HOSPITAL | Age: 68
LOS: 1 days | Discharge: HOME | End: 2023-09-06
Attending: EMERGENCY MEDICINE | Admitting: EMERGENCY MEDICINE
Payer: SELF-PAY

## 2023-09-06 VITALS
RESPIRATION RATE: 16 BRPM | TEMPERATURE: 99 F | WEIGHT: 134.92 LBS | DIASTOLIC BLOOD PRESSURE: 83 MMHG | SYSTOLIC BLOOD PRESSURE: 153 MMHG | OXYGEN SATURATION: 97 % | HEART RATE: 88 BPM

## 2023-09-06 VITALS
RESPIRATION RATE: 18 BRPM | SYSTOLIC BLOOD PRESSURE: 108 MMHG | OXYGEN SATURATION: 100 % | HEART RATE: 71 BPM | TEMPERATURE: 98 F | DIASTOLIC BLOOD PRESSURE: 70 MMHG

## 2023-09-06 DIAGNOSIS — T40.1X1A POISONING BY HEROIN, ACCIDENTAL (UNINTENTIONAL), INITIAL ENCOUNTER: ICD-10-CM

## 2023-09-06 DIAGNOSIS — Z87.898 PERSONAL HISTORY OF OTHER SPECIFIED CONDITIONS: Chronic | ICD-10-CM

## 2023-09-06 PROCEDURE — 82962 GLUCOSE BLOOD TEST: CPT

## 2023-09-06 PROCEDURE — 99284 EMERGENCY DEPT VISIT MOD MDM: CPT

## 2023-09-06 PROCEDURE — 99285 EMERGENCY DEPT VISIT HI MDM: CPT

## 2023-09-06 RX ORDER — SODIUM CHLORIDE 9 MG/ML
1000 INJECTION INTRAMUSCULAR; INTRAVENOUS; SUBCUTANEOUS ONCE
Refills: 0 | Status: COMPLETED | OUTPATIENT
Start: 2023-09-06 | End: 2023-09-06

## 2023-09-06 RX ADMIN — SODIUM CHLORIDE 2000 MILLILITER(S): 9 INJECTION INTRAMUSCULAR; INTRAVENOUS; SUBCUTANEOUS at 20:39

## 2023-09-06 NOTE — ED PROVIDER NOTE - PATIENT PORTAL LINK FT
You can access the FollowMyHealth Patient Portal offered by [facility] by registering at the following website: [email or web address]. By joining [employer]’s FollowMyHealth portal, you will also be able to view your health information using other applications (apps) compatible with our system.

## 2023-09-06 NOTE — ED ADULT NURSE NOTE - CHIEF COMPLAINT QUOTE
pt BIBA from street for eval after herpin overdose. [employer] gave 2-4mg intranasal Narcan. Per EMS pt was awake alert then fell back asleep with RR of 8, additional 2mg narcan given with good effect pt arrives calm cooperative no respiratory depression sating 97% on RA denies other drug or etoh use

## 2023-09-06 NOTE — ED PROVIDER NOTE - CLINICAL SUMMARY MEDICAL DECISION MAKING FREE TEXT BOX
heroin overdose s/p narcan x2, awake alert, will continue to monitor.. heroin overdose s/p narcan x2, awake alert, no signs of trauma, suspect transient dec in BP taken while sleeping, NAD, when awake BP improved, will continue to monitor..

## 2023-09-06 NOTE — ED ADULT TRIAGE NOTE - CHIEF COMPLAINT QUOTE
pt BIBA from street for eval after herpin overdose. [employer] gave 2-4mg intranasal Narcan. Per EMS pt was awake alert then fell back asleep with RR of 8, additional 2mg narcan given with good effect pt arrives calm cooperative no respiratory depression sating 97% on RA denies other drug or etoh use pt BIBA from street for eval after heroin overdose. [employer] gave 2-4mg intranasal Narcan. Per EMS pt was awake alert then fell back asleep with RR of 8, additional 2mg narcan given with good effect pt arrives calm cooperative no respiratory depression sating 97% on RA denies other drug or etoh use

## 2023-09-06 NOTE — ED ADULT NURSE NOTE - NSFALLUNIVINTERV_ED_ALL_ED
Bed/Stretcher in lowest position, wheels locked, appropriate side rails in place/Call bell, personal items and telephone in reach/Instruct patient to call for assistance before getting out of bed/chair/stretcher/Non-slip footwear applied when patient is off stretcher/[place] to call system/Physically safe environment - no spills, clutter or unnecessary equipment/Purposeful proactive rounding/Room/bathroom lighting operational, light cord in reach

## 2023-09-06 NOTE — ED PROVIDER NOTE - PROGRESS NOTE DETAILS
pt improved, wen PO, no GI bleed sx, recent n/v/d or other complaints. pt improved, wen PO, no GI bleed sx, recent n/v/d or other complaints, ambulating, no resp complaints, VSS.

## 2023-09-06 NOTE — ED ADULT NURSE NOTE - CAS EDP DISCH TYPE
Shelter Cyclophosphamide Pregnancy And Lactation Text: This medication is Pregnancy Category D and it isn't considered safe during pregnancy. This medication is excreted in breast milk.

## 2023-09-06 NOTE — ED ADULT NURSE NOTE - OBJECTIVE STATEMENT
68 y/o M with pmhx heroin use presents to the ED via EMS s/p heroin OD. Per EMS, they arrived to the scene, gave 4mg intranasal narcan, then pt woke up and came in ambulance. Pt then lost consciousness again with a RR 18, so medics gave another 4mg intranasal narcan 3minutes PTA. In ED, pt placed on cardiac monitor sating 100% on 2LNC, normotensive, NSR. PIV placed emergently for narcan IVP/gtt prep. On exam, pt drowsy, awakened to noxious stimuli of PIV insertion. States he did his usual dose of 1 bag of heroin then falls back asleep. Opens eyes intermittently. A&OX4. Endorses h/o previous OD. Denies injury/pain. 66 y/o M with pmhx heroin use presents to the ED via EMS s/p heroin OD. Per EMS, they arrived to the scene, gave 4mg intranasal narcan, then pt woke up and came in ambulance. Pt then lost consciousness again with a RR 18, so medics gave another 4mg intranasal narcan 3minutes PTA. In ED, pt placed on cardiac monitor sating 97% on RA, normotensive, NSR, ETCO2 33. PIV placed emergently for narcan IVP/gtt prep. On exam, pt drowsy, awakened to noxious stimuli of PIV insertion. States he did his usual dose of 1 bag of heroin then falls back asleep. Opens eyes intermittently. A&OX4. Endorses h/o previous OD. Denies injury/pain.

## 2023-09-06 NOTE — ED PROVIDER NOTE - NSFOLLOWUPINSTRUCTIONS_ED_ALL_ED_FT
[facility]  Family Medicine  [address] [place] [address]  Phone: [phone]      Opioid Overdose  Opioids are drugs that are often used to treat pain. Opioids include illegal drugs, such as heroin, as well as prescription pain medicines, such as codeine, morphine, hydrocodone, and fentanyl.    An opioid overdose happens when you take too much of an opioid. An overdose may be intentional or accidental and can happen with any type of opioid.    The effects of an overdose can be mild, dangerous, or even deadly. Opioid overdose is a medical emergency.    What are the causes?  This condition may be caused by:  Taking too much of an opioid on purpose.  Taking too much of an opioid by accident.  Using two or more substances that contain opioids at the same time.  Taking an opioid with a substance that affects your heart, breathing, or blood pressure. These include alcohol, tranquilizers, sleeping pills, illegal drugs, and some over-the-counter medicines.  This condition may also happen due to an error made by:  A health care provider who prescribes a medicine.  The pharmacist who fills the prescription.  What increases the risk?  This condition is more likely in:  Children. They may be attracted to colorful pills. Because of a child's small size, even a small amount of a medicine can be dangerous.  Older people. They may be taking many different medicines. Older people may have difficulty reading labels or remembering when they last took their medicines. They may also be more sensitive to the effects of opioids.  People with chronic medical conditions, especially heart, liver, kidney, or neurological diseases.  People who take an opioid for a long period of time.  People who take opioids and use illegal drugs, such as heroin, or other substances, such as alcohol.  People who:  Have a history of drug or alcohol abuse.  Have certain mental health conditions.  Have a history of previous drug overdoses.  People who take opioids that are not prescribed for them.  What are the signs or symptoms?  Symptoms of this condition depend on the type of opioid and the amount that was taken. Common symptoms include:  Sleepiness or difficulty waking from sleep.  Confusion.  Slurred speech.  Slowed breathing and a slow pulse (bradycardia).  Nausea and vomiting.  Abnormally small pupils.  Signs and symptoms that require emergency treatment include:  Cold, clammy, and pale skin.  Blue lips and fingernails.  Vomiting.  Gurgling sounds in the throat.  A pulse that is very slow or difficult to detect.  Breathing that is very irregular, slow, noisy, or difficult to detect.  Inability to respond to speech or be awakened from sleep (stupor).  Seizures.  How is this diagnosed?  This condition is diagnosed based on your symptoms and medical history. It is important to tell your health care provider:  About all of the opioids that you took.  When you took the opioids.  Whether you were drinking alcohol or using marijuana, cocaine, or other drugs.  Your health care provider will do a physical exam. This exam may include:  Checking and monitoring your heart rate and rhythm, breathing rate, temperature, and blood pressure.  Measuring oxygen levels in your blood.  Checking for abnormally small pupils.  You may also have blood tests or urine tests. You may have X-rays if you are having severe breathing problems.    How is this treated?  This condition requires immediate medical treatment and hospitalization. Reversing the effects of the opioid is the first step in treatment. If you have a Narcan kit or naloxone, use it right away. Follow your health care provider's instructions. A friend or family member can also help you with this.    The rest of your treatment will be given in the hospital intensive care (ICU). Treatment in the hospital may include:  Giving salts and minerals (electrolytes) along with fluids through an IV.  Inserting a breathing tube (endotracheal tube) in your airway to help you breathe if you cannot breathe on your own or you are in danger of not being able to breathe on your own.  Giving oxygen through a small tube under your nose.  Passing a tube through your nose and into your stomach (nasogastric tube, or NG tube) to empty your stomach.  Giving medicines that:  Increase your blood pressure.  Relieve nausea and vomiting.  Relieve abdominal pain and cramping.  Reverse the effects of the opioid (naloxone).  Monitoring your heart and oxygen levels.  Ongoing counseling and mental health support if you intentionally overdosed or used an illegal drug.  Follow these instructions at home:  Three cups showing dark yellow, yellow, and pale yellow urine.  Medicines    Take over-the-counter and prescription medicines only as told by your health care provider.  Always ask your health care provider about possible side effects and interactions of any new medicine that you start taking.  Keep a list of all the medicines that you take, including over-the-counter medicines. Bring this list with you to all your medical visits.  General instructions    Drink enough fluid to keep your urine pale yellow.  Keep all follow-up visits. This is important.  How is this prevented?  Read the drug inserts that come with your opioid pain medicines.  Take medicines only as told by your health care provider. Do not take more medicine than you are told. Do not take medicines more frequently than you are told.  Do not drink alcohol or take sedatives when taking opioids.  Do not use illegal or recreational drugs, including cocaine, ecstasy, and marijuana.  Do not take opioid medicines that are not prescribed for you.  Store all medicines in safety containers that are out of the reach of children.  Get help if you are struggling with:  Alcohol or drug use.  Depression or another mental health problem.  Thoughts of hurting yourself or another person.  Keep the phone number of your local poison control center near your phone or in your mobile phone. In the [place] the hotline of the National Poison Control Center is (158) 492-2317.  If you were prescribed naloxone, make sure you understand how to take it.  Contact a health care provider if:  You need help understanding how to take your pain medicines.  You feel your medicines are too strong.  You are concerned that your pain medicines are not working well for your pain.  You develop new symptoms or side effects when you are taking medicines.  Get help right away if:  You or someone else is having symptoms of an opioid overdose. Get help even if you are not sure.  You have thoughts about hurting yourself or others.  You have:  Chest pain.  Difficulty breathing.  A loss of consciousness.  These symptoms may represent a serious problem that is an emergency. Do not wait to see if the symptoms will go away. Get medical help right away. Call your local emergency services (151 in the [place] Do not drive yourself to the hospital.    If you ever feel like you may hurt yourself or others, or have thoughts about taking your own life, get help right away. You can go to your nearest emergency department or:  Call your local emergency services (970 in the [place]  Call a suicide crisis helpline, such as the National Suicide Prevention Lifeline at 1-622.977.4537 or 635 in the [place] This is open 24 hours a day in the [place]  Text the Crisis Text Line at 509277 (in the [place]  Summary  Opioids are drugs that are often used to treat pain. Opioids include illegal drugs, such as heroin, as well as prescription pain medicines.  An opioid overdose happens when you take too much of an opioid.  Overdoses can be intentional or accidental.  Opioid overdose is very dangerous. It is a life-threatening emergency.  If you or someone you know is experiencing an opioid overdose, get help right away.  This information is not intended to replace advice given to you by your health care provider. Make sure you discuss any questions you have with your health care provider.

## 2023-09-06 NOTE — ED PROVIDER NOTE - OBJECTIVE STATEMENT
68 yo states no pmh here s/p narcan, pt w overdose of heroin 1 bag. Pt states she normally takes 1 bag every two weeks, does not know if it was contaminated. No other complaints, Denies hitting head, LOC, vision changes, focal weakness/numbness, neck/back pain, SOB/CP, HA, abd pain, NVD, black/bloody stool, urinary complaints, URI symptoms. Denies recent illnesses or medication changes. Not on AC. 66 yo states no pmh here s/p narcan, pt w overdose of heroin 1 bag. Pt states he normally takes 1 bag every two weeks, does not know if it was contaminated. No other complaints, Denies hitting head, LOC, vision changes, focal weakness/numbness, neck/back pain, SOB/CP, HA, abd pain, NVD, black/bloody stool, urinary complaints, URI symptoms. Denies recent illnesses.
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
## 2023-10-24 ENCOUNTER — EMERGENCY (EMERGENCY)
Facility: HOSPITAL | Age: 68
LOS: 1 days | Discharge: HOME | End: 2023-10-24
Attending: EMERGENCY MEDICINE | Admitting: EMERGENCY MEDICINE
Payer: MEDICARE

## 2023-10-24 VITALS — RESPIRATION RATE: 18 BRPM | OXYGEN SATURATION: 95 % | WEIGHT: 139.99 LBS | TEMPERATURE: 101 F | HEART RATE: 115 BPM

## 2023-10-24 VITALS — HEART RATE: 97 BPM

## 2023-10-24 DIAGNOSIS — R50.9 FEVER, UNSPECIFIED: ICD-10-CM

## 2023-10-24 DIAGNOSIS — T40.1X1A POISONING BY HEROIN, ACCIDENTAL (UNINTENTIONAL), INITIAL ENCOUNTER: ICD-10-CM

## 2023-10-24 DIAGNOSIS — Z59.01 SHELTERED HOMELESSNESS: ICD-10-CM

## 2023-10-24 DIAGNOSIS — Z87.898 PERSONAL HISTORY OF OTHER SPECIFIED CONDITIONS: Chronic | ICD-10-CM

## 2023-10-24 DIAGNOSIS — Z20.822 CONTACT WITH AND (SUSPECTED) EXPOSURE TO COVID-19: ICD-10-CM

## 2023-10-24 LAB
ALBUMIN SERPL ELPH-MCNC: 4.2 G/DL — SIGNIFICANT CHANGE UP (ref 3.4–5)
ALBUMIN SERPL ELPH-MCNC: 4.2 G/DL — SIGNIFICANT CHANGE UP (ref 3.4–5)
ALP SERPL-CCNC: 93 U/L — SIGNIFICANT CHANGE UP (ref 40–120)
ALP SERPL-CCNC: 93 U/L — SIGNIFICANT CHANGE UP (ref 40–120)
ALT FLD-CCNC: 26 U/L — SIGNIFICANT CHANGE UP (ref 12–42)
ALT FLD-CCNC: 26 U/L — SIGNIFICANT CHANGE UP (ref 12–42)
ANION GAP SERPL CALC-SCNC: 13 MMOL/L — SIGNIFICANT CHANGE UP (ref 9–16)
ANION GAP SERPL CALC-SCNC: 13 MMOL/L — SIGNIFICANT CHANGE UP (ref 9–16)
APTT BLD: 31.2 SEC — SIGNIFICANT CHANGE UP (ref 24.5–35.6)
APTT BLD: 31.2 SEC — SIGNIFICANT CHANGE UP (ref 24.5–35.6)
AST SERPL-CCNC: 22 U/L — SIGNIFICANT CHANGE UP (ref 15–37)
AST SERPL-CCNC: 22 U/L — SIGNIFICANT CHANGE UP (ref 15–37)
BASOPHILS # BLD AUTO: 0.07 K/UL — SIGNIFICANT CHANGE UP (ref 0–0.2)
BASOPHILS # BLD AUTO: 0.07 K/UL — SIGNIFICANT CHANGE UP (ref 0–0.2)
BASOPHILS NFR BLD AUTO: 0.5 % — SIGNIFICANT CHANGE UP (ref 0–2)
BASOPHILS NFR BLD AUTO: 0.5 % — SIGNIFICANT CHANGE UP (ref 0–2)
BILIRUB SERPL-MCNC: 1.5 MG/DL — HIGH (ref 0.2–1.2)
BILIRUB SERPL-MCNC: 1.5 MG/DL — HIGH (ref 0.2–1.2)
BUN SERPL-MCNC: 26 MG/DL — HIGH (ref 7–23)
BUN SERPL-MCNC: 26 MG/DL — HIGH (ref 7–23)
CALCIUM SERPL-MCNC: 10.2 MG/DL — SIGNIFICANT CHANGE UP (ref 8.5–10.5)
CALCIUM SERPL-MCNC: 10.2 MG/DL — SIGNIFICANT CHANGE UP (ref 8.5–10.5)
CHLORIDE SERPL-SCNC: 100 MMOL/L — SIGNIFICANT CHANGE UP (ref 96–108)
CHLORIDE SERPL-SCNC: 100 MMOL/L — SIGNIFICANT CHANGE UP (ref 96–108)
CO2 SERPL-SCNC: 22 MMOL/L — SIGNIFICANT CHANGE UP (ref 22–31)
CO2 SERPL-SCNC: 22 MMOL/L — SIGNIFICANT CHANGE UP (ref 22–31)
CREAT SERPL-MCNC: 1.27 MG/DL — SIGNIFICANT CHANGE UP (ref 0.5–1.3)
CREAT SERPL-MCNC: 1.27 MG/DL — SIGNIFICANT CHANGE UP (ref 0.5–1.3)
DOHLE BOD BLD QL SMEAR: PRESENT — SIGNIFICANT CHANGE UP
DOHLE BOD BLD QL SMEAR: PRESENT — SIGNIFICANT CHANGE UP
EGFR: 61 ML/MIN/1.73M2 — SIGNIFICANT CHANGE UP
EGFR: 61 ML/MIN/1.73M2 — SIGNIFICANT CHANGE UP
EOSINOPHIL # BLD AUTO: 0.26 K/UL — SIGNIFICANT CHANGE UP (ref 0–0.5)
EOSINOPHIL # BLD AUTO: 0.26 K/UL — SIGNIFICANT CHANGE UP (ref 0–0.5)
EOSINOPHIL NFR BLD AUTO: 2 % — SIGNIFICANT CHANGE UP (ref 0–6)
EOSINOPHIL NFR BLD AUTO: 2 % — SIGNIFICANT CHANGE UP (ref 0–6)
FLUAV AG NPH QL: SIGNIFICANT CHANGE UP
FLUAV AG NPH QL: SIGNIFICANT CHANGE UP
FLUBV AG NPH QL: SIGNIFICANT CHANGE UP
FLUBV AG NPH QL: SIGNIFICANT CHANGE UP
GLUCOSE BLDC GLUCOMTR-MCNC: 90 MG/DL — SIGNIFICANT CHANGE UP (ref 70–99)
GLUCOSE BLDC GLUCOMTR-MCNC: 90 MG/DL — SIGNIFICANT CHANGE UP (ref 70–99)
GLUCOSE SERPL-MCNC: 102 MG/DL — HIGH (ref 70–99)
GLUCOSE SERPL-MCNC: 102 MG/DL — HIGH (ref 70–99)
HCT VFR BLD CALC: 50.2 % — HIGH (ref 39–50)
HCT VFR BLD CALC: 50.2 % — HIGH (ref 39–50)
HGB BLD-MCNC: 16.2 G/DL — SIGNIFICANT CHANGE UP (ref 13–17)
HGB BLD-MCNC: 16.2 G/DL — SIGNIFICANT CHANGE UP (ref 13–17)
IMM GRANULOCYTES NFR BLD AUTO: 0.5 % — SIGNIFICANT CHANGE UP (ref 0–0.9)
IMM GRANULOCYTES NFR BLD AUTO: 0.5 % — SIGNIFICANT CHANGE UP (ref 0–0.9)
INR BLD: 1.1 — SIGNIFICANT CHANGE UP (ref 0.85–1.18)
INR BLD: 1.1 — SIGNIFICANT CHANGE UP (ref 0.85–1.18)
LACTATE BLDV-MCNC: 3.8 MMOL/L — HIGH (ref 0.5–2)
LACTATE BLDV-MCNC: 3.8 MMOL/L — HIGH (ref 0.5–2)
LYMPHOCYTES # BLD AUTO: 0.47 K/UL — LOW (ref 1–3.3)
LYMPHOCYTES # BLD AUTO: 0.47 K/UL — LOW (ref 1–3.3)
LYMPHOCYTES # BLD AUTO: 3.6 % — LOW (ref 13–44)
LYMPHOCYTES # BLD AUTO: 3.6 % — LOW (ref 13–44)
MANUAL SMEAR VERIFICATION: SIGNIFICANT CHANGE UP
MANUAL SMEAR VERIFICATION: SIGNIFICANT CHANGE UP
MCHC RBC-ENTMCNC: 30.2 PG — SIGNIFICANT CHANGE UP (ref 27–34)
MCHC RBC-ENTMCNC: 30.2 PG — SIGNIFICANT CHANGE UP (ref 27–34)
MCHC RBC-ENTMCNC: 32.3 GM/DL — SIGNIFICANT CHANGE UP (ref 32–36)
MCHC RBC-ENTMCNC: 32.3 GM/DL — SIGNIFICANT CHANGE UP (ref 32–36)
MCV RBC AUTO: 93.7 FL — SIGNIFICANT CHANGE UP (ref 80–100)
MCV RBC AUTO: 93.7 FL — SIGNIFICANT CHANGE UP (ref 80–100)
MONOCYTES # BLD AUTO: 0.47 K/UL — SIGNIFICANT CHANGE UP (ref 0–0.9)
MONOCYTES # BLD AUTO: 0.47 K/UL — SIGNIFICANT CHANGE UP (ref 0–0.9)
MONOCYTES NFR BLD AUTO: 3.6 % — SIGNIFICANT CHANGE UP (ref 2–14)
MONOCYTES NFR BLD AUTO: 3.6 % — SIGNIFICANT CHANGE UP (ref 2–14)
NEUTROPHILS # BLD AUTO: 11.68 K/UL — HIGH (ref 1.8–7.4)
NEUTROPHILS # BLD AUTO: 11.68 K/UL — HIGH (ref 1.8–7.4)
NEUTROPHILS NFR BLD AUTO: 89.8 % — HIGH (ref 43–77)
NEUTROPHILS NFR BLD AUTO: 89.8 % — HIGH (ref 43–77)
NRBC # BLD: 0 /100 WBCS — SIGNIFICANT CHANGE UP (ref 0–0)
NRBC # BLD: 0 /100 WBCS — SIGNIFICANT CHANGE UP (ref 0–0)
PLAT MORPH BLD: NORMAL — SIGNIFICANT CHANGE UP
PLAT MORPH BLD: NORMAL — SIGNIFICANT CHANGE UP
PLATELET # BLD AUTO: 208 K/UL — SIGNIFICANT CHANGE UP (ref 150–400)
PLATELET # BLD AUTO: 208 K/UL — SIGNIFICANT CHANGE UP (ref 150–400)
POTASSIUM SERPL-MCNC: 3.8 MMOL/L — SIGNIFICANT CHANGE UP (ref 3.5–5.3)
POTASSIUM SERPL-MCNC: 3.8 MMOL/L — SIGNIFICANT CHANGE UP (ref 3.5–5.3)
POTASSIUM SERPL-SCNC: 3.8 MMOL/L — SIGNIFICANT CHANGE UP (ref 3.5–5.3)
POTASSIUM SERPL-SCNC: 3.8 MMOL/L — SIGNIFICANT CHANGE UP (ref 3.5–5.3)
PROT SERPL-MCNC: 9.3 G/DL — HIGH (ref 6.4–8.2)
PROT SERPL-MCNC: 9.3 G/DL — HIGH (ref 6.4–8.2)
PROTHROM AB SERPL-ACNC: 12.1 SEC — SIGNIFICANT CHANGE UP (ref 9.5–13)
PROTHROM AB SERPL-ACNC: 12.1 SEC — SIGNIFICANT CHANGE UP (ref 9.5–13)
RBC # BLD: 5.36 M/UL — SIGNIFICANT CHANGE UP (ref 4.2–5.8)
RBC # BLD: 5.36 M/UL — SIGNIFICANT CHANGE UP (ref 4.2–5.8)
RBC # FLD: 13.5 % — SIGNIFICANT CHANGE UP (ref 10.3–14.5)
RBC # FLD: 13.5 % — SIGNIFICANT CHANGE UP (ref 10.3–14.5)
RBC BLD AUTO: NORMAL — SIGNIFICANT CHANGE UP
RBC BLD AUTO: NORMAL — SIGNIFICANT CHANGE UP
RSV RNA NPH QL NAA+NON-PROBE: SIGNIFICANT CHANGE UP
RSV RNA NPH QL NAA+NON-PROBE: SIGNIFICANT CHANGE UP
SARS-COV-2 RNA SPEC QL NAA+PROBE: SIGNIFICANT CHANGE UP
SARS-COV-2 RNA SPEC QL NAA+PROBE: SIGNIFICANT CHANGE UP
SODIUM SERPL-SCNC: 135 MMOL/L — SIGNIFICANT CHANGE UP (ref 132–145)
SODIUM SERPL-SCNC: 135 MMOL/L — SIGNIFICANT CHANGE UP (ref 132–145)
TOXIC GRANULES BLD QL SMEAR: PRESENT — SIGNIFICANT CHANGE UP
TOXIC GRANULES BLD QL SMEAR: PRESENT — SIGNIFICANT CHANGE UP
TROPONIN I, HIGH SENSITIVITY RESULT: 6.2 NG/L — SIGNIFICANT CHANGE UP
TROPONIN I, HIGH SENSITIVITY RESULT: 6.2 NG/L — SIGNIFICANT CHANGE UP
WBC # BLD: 13.02 K/UL — HIGH (ref 3.8–10.5)
WBC # BLD: 13.02 K/UL — HIGH (ref 3.8–10.5)
WBC # FLD AUTO: 13.02 K/UL — HIGH (ref 3.8–10.5)
WBC # FLD AUTO: 13.02 K/UL — HIGH (ref 3.8–10.5)

## 2023-10-24 PROCEDURE — 71045 X-RAY EXAM CHEST 1 VIEW: CPT | Mod: 26

## 2023-10-24 PROCEDURE — 99285 EMERGENCY DEPT VISIT HI MDM: CPT

## 2023-10-24 RX ORDER — SODIUM CHLORIDE 9 MG/ML
1950 INJECTION INTRAMUSCULAR; INTRAVENOUS; SUBCUTANEOUS ONCE
Refills: 0 | Status: COMPLETED | OUTPATIENT
Start: 2023-10-24 | End: 2023-10-24

## 2023-10-24 RX ORDER — VANCOMYCIN HCL 1 G
1250 VIAL (EA) INTRAVENOUS ONCE
Refills: 0 | Status: COMPLETED | OUTPATIENT
Start: 2023-10-24 | End: 2023-10-24

## 2023-10-24 RX ORDER — ACETAMINOPHEN 500 MG
650 TABLET ORAL ONCE
Refills: 0 | Status: COMPLETED | OUTPATIENT
Start: 2023-10-24 | End: 2023-10-24

## 2023-10-24 RX ORDER — CEFTRIAXONE 500 MG/1
1000 INJECTION, POWDER, FOR SOLUTION INTRAMUSCULAR; INTRAVENOUS ONCE
Refills: 0 | Status: COMPLETED | OUTPATIENT
Start: 2023-10-24 | End: 2023-10-24

## 2023-10-24 RX ADMIN — Medication 166.67 MILLIGRAM(S): at 19:35

## 2023-10-24 RX ADMIN — Medication 650 MILLIGRAM(S): at 17:55

## 2023-10-24 RX ADMIN — SODIUM CHLORIDE 1950 MILLILITER(S): 9 INJECTION INTRAMUSCULAR; INTRAVENOUS; SUBCUTANEOUS at 18:07

## 2023-10-24 RX ADMIN — CEFTRIAXONE 100 MILLIGRAM(S): 500 INJECTION, POWDER, FOR SOLUTION INTRAMUSCULAR; INTRAVENOUS at 18:22

## 2023-10-24 SDOH — ECONOMIC STABILITY - HOUSING INSECURITY: SHELTERED HOMELESSNESS: Z59.01

## 2023-10-24 NOTE — ED PROVIDER NOTE - PHYSICAL EXAMINATION
Constitutional:  Well appearing, awake, alert, oriented to person, place, time/situation, covered in fecal matter   Head:  NC/AT, symmetric  ENMT: Airway patent, nasal mucosa clear, mouth with normal mucosa, throat has no vesicles, no oropharyngeal exudates and uvula is midline  Eyes:  Clear bilaterally, pupils equal, round and reactive to light, 5mm BL, EOMI  Cardiac:  tachycardic, regular rhythm. Heart sounds S1,S2.  No murmurs, rubs or gallops.  No JVD.  Respiratory:  Breath sounds clear and equal bilaterally  GI:   Abd soft, non-distended, non-tender, no guarding  MSK:  Spine appears normal, range of motion is not limited, no muscle or joint tenderness  Neuro:  Alert and oriented, no focal deficits, no motor or sensory deficits  Skin:  Skin normal color for race, warm, dry and intact.  No evidence of rash  Psych:  Normal mood and affect, no apparent risk to self or others. Constitutional:  Well appearing, awake, alert, oriented to person, place, time/situation, covered in fecal matter   Head:  NC/AT, symmetric  ENMT: Airway patent, nasal mucosa clear  Eyes:  Clear bilaterally, pupils equal, round and reactive to light, 5mm BL, EOMI  Cardiac:  tachycardic, regular rhythm. Heart sounds S1,S2.  No murmurs, rubs or gallops.  No JVD.  Respiratory:  rhonchorous breath sounds BL, no respiratory distress, no wheezing   GI:   Abd soft, non-distended, non-tender, no guarding  Male : Chaperone present for exam, left testicle with significant enlargement, not hard, mobile, mild erythema, no ttp, induration, or fluctuance, no crepitus   MSK:  Spine appears normal, range of motion is not limited, no muscle or joint tenderness  Neuro:  Alert and oriented, no focal deficits, no motor or sensory deficits  Skin:  Skin normal color for race, warm, dry and intact.  No evidence of rash  Psych:  Normal mood and affect, no apparent risk to self or others. Constitutional:  Well appearing, awake, alert, oriented to person, place, time/situation, covered in fecal matter   Head:  NC/AT, symmetric  ENMT: Airway patent, nasal mucosa clear  Eyes:  Clear bilaterally, pupils equal, round and reactive to light, 5mm BL, EOMI  Cardiac:  tachycardic, regular rhythm. Heart sounds S1,S2.  No murmurs, rubs or gallops.  No JVD.  Respiratory:  rhonchorous breath sounds BL, no respiratory distress, no wheezing, O2 sat 94% on RA   GI:   Abd soft, non-distended, non-tender, no guarding  Male : Chaperone present for exam, left testicle with significant hypertrophy, not hard or high riding, mobile, mild erythema, no ttp, induration, or fluctuance, no crepitus   MSK:  PRAKASH  Neuro:  Alert and oriented, no focal deficits  Skin:  Skin normal color for race, warm, dry and intact.  No evidence of rash

## 2023-10-24 NOTE — ED ADULT NURSE NOTE - OBJECTIVE STATEMENT
PT is  a 68y male BIBEMS from shelter complaining of overdose. PT admits to using heroine . Pt given 8mg of narcan IN prior to arrival.

## 2023-10-24 NOTE — ED PROVIDER NOTE - CLINICAL SUMMARY MEDICAL DECISION MAKING FREE TEXT BOX
69 yo male with PMH of heroin use who is presenting s/p overdose at the shelter.    Overdose   Fever   - c/f sepsis vs covid vs flu vs PNA  - plan to get labs including blood cultures   - EKG   - monitor O2   - CXR     presently patient is refusing all care. He is alert and oriented to person and place. 69 yo male with PMH of heroin use who is presenting s/p overdose at the shelter.    Overdose   Fever   - c/f sepsis vs covid vs flu vs PNA  - plan to get labs including blood cultures   - EKG   - monitor O2   - CXR     Patient refused further care after initial labs were done and medications were given. Patient was advised of the risks of leaving AMA given that he had a fever on arrival and lactic acidosis concerning for sepsis. Also concern for underlying testicular cancer given exam and possible pna. Will plan to treat with antibiotics to cover for possible  cellulitis.     presently patient is refusing all care. He is alert and oriented to person and place. 69 yo male with PMH of heroin use who is presenting s/p overdose at the shelter.    Overdose   Fever   - c/f sepsis and endocarditis given IVDU vs covid vs flu vs PNA  - plan to get labs including blood cultures  - start antibiotics   - EKG   - monitor O2 and end tidal   - CXR   - reassess    Patient refused further care after initial labs were done and medications were given. Patient was advised of the risks of leaving AMA given that he had a fever on arrival and lactic acidosis concerning for sepsis. Also concern for underlying testicular cancer given exam and possible pna. Will plan to treat with antibiotics to cover for possible  cellulitis.     presently patient is refusing all care. He is alert and oriented to person and place.

## 2023-10-24 NOTE — ED ADULT NURSE REASSESSMENT NOTE - NS ED NURSE REASSESS COMMENT FT1
Unable to obtain rpt labs after multiple attempts. NELLIE Palafox at bedside for ultrasound IV placement. Pt not tolerating procedure. requesting to leave AMA.

## 2023-10-24 NOTE — ED PROVIDER NOTE - TOBACCO USE
ICU End of Shift Summary.  For vital signs and complete assessments, please see documentation flowsheets.     Pertinent assessments: Patient continues to be ICU status. Patient continues to be intubated and sedated. Patient is on 75mcg/kg/min for sedation. Patient did require 2 PRN doses of Versed and Ativan throughout the shift for restlessness. Per CHRIS Versed if preferred for anxiety. Patient continues with soft restraints for safety. Patient able to write on paper for communication with staff when alert. RASS at -1 which is goal. Patients BG's stayed within normal limits and no insulin was needed per orders.      Major Shift Events:  Resp rate changed from 14 to 12 at 0630. Volume of 400. PEEP of 5 and FiO2 is currently at 25%. K+ and Magnesium need replacement. K+ x 1 dose completed, second dose needs to be hung. Magnesium is running currently.     Plan (Upcoming Events): Continue to monitor respiratory status and QT and MD intervals. Normal sinus rhythm throughout the night.     Discharge/Transfer Needs: TBD    Bedside Shift Report Completed : yes  Bedside Safety Check Completed: yes      Unknown if ever smoked

## 2023-10-24 NOTE — ED ADULT NURSE REASSESSMENT NOTE - NS ED NURSE REASSESS COMMENT FT1
Pt with multiple episodes of diarrhea. PT cleaned and changed. IV lines placed. Unable to get 2 sets of blood cultures. PA made aware.

## 2023-10-24 NOTE — ED PROVIDER NOTE - PATIENT PORTAL LINK FT
You can access the FollowMyHealth Patient Portal offered by [facility] by registering at the following website: [email or web address]. By joining [facility]’s FollowMyHealth portal, you will also be able to view your health information using other applications (apps) compatible with our system.

## 2023-10-24 NOTE — ED PROVIDER NOTE - NS ED ROS FT
EMERGENCY DEPARTMENT HISTORY AND PHYSICAL EXAM 
     
 
Date: 9/24/2019 Patient Name: Sumit Canales History of Presenting Illness Chief Complaint Patient presents with  Abnormal Lab Results States she has a low hgb that was checked today at the living facility History Provided By: Patient HPI: Sumit Canales is a 78 y.o. female, pmhx High cholesterol, COPD, dyspepsia, who presents via EMS to the ED c/o chest pain and anemia Patient states she was having some pain behind her left breast today. \"a physician\" came to her house today and sent blood work. The staff called her this afternoon and told her she was anemic and should come to the ER. Patient states the pain has been off and on for three days and was not relieved with tums; but she also notes that she is no longer having the pain and further denies any SOB (if she wears oxygen), nausea/vomiting (but felt nauseated a couple days ago), AP, BP, headache. She does complain of cough with clear phlegm and swelling all over her body. PCP: Chantell Ponce NP Allergies: Multiple Social Hx: +tobacco, -EtOH, -Illicit Drugs There are no other complaints, changes, or physical findings at this time. Current Facility-Administered Medications Medication Dose Route Frequency Provider Last Rate Last Dose  potassium chloride SR (KLOR-CON 10) tablet 40 mEq  40 mEq Oral NOW Michelle Ac MD      
 0.9% sodium chloride infusion 250 mL  250 mL IntraVENous PRN Michelle Ac MD      
 potassium chloride 10 mEq in 100 ml IVPB  10 mEq IntraVENous Q1H Michelle Ceron MD      
 sodium chloride (NS) flush 5-40 mL  5-40 mL IntraVENous Q8H Michelle Ceron MD      
 sodium chloride (NS) flush 5-40 mL  5-40 mL IntraVENous PRN Sean Sanchez MD      
 acetaminophen (TYLENOL) tablet 650 mg  650 mg Oral Q4H PRN Sean Sanchez MD      
 ondansetron [facility]) injection 4 mg  4 mg IntraVENous Q4H PRN Army Layo MD      
 haloperidol (HALDOL) 2 mg/mL oral solution 1 mg  1 mg Oral QID PRN Army Layo MD      
 [START ON 9/25/2019] albuterol-ipratropium (DUO-NEB) 2.5 MG-0.5 MG/3 ML  3 mL Nebulization QID RT Lorena Ceron MD      
 albuterol (PROVENTIL VENTOLIN) nebulizer solution 2.5 mg  2.5 mg Nebulization Q4H PRN Lorena Ceron MD      
 magnesium sulfate 2 g/50 ml IVPB (premix or compounded)  2 g IntraVENous ONCE Army Layo MD      
 [START ON 9/25/2019] albumin human 25% (BUMINATE) solution 25 g  25 g IntraVENous Q6H Army Layo MD      
 [START ON 9/25/2019] furosemide (LASIX) injection 40 mg  40 mg IntraVENous DAILY Lorena Ceron MD      
 
Current Outpatient Medications Medication Sig Dispense Refill  albuterol (ACCUNEB) 0.63 mg/3 mL nebulizer solution 3 mL by Nebulization route three (3) times daily as needed for Wheezing for up to 90 days. 270 Nebule 1  
 acetaminophen (TYLENOL) 325 mg tablet Take 650 mg by mouth every four (4) hours as needed (minor pain).  aspirin delayed-release 81 mg tablet Take 81 mg by mouth daily.  acetaminophen (TYLENOL) 650 mg suppository Insert 650 mg into rectum every six (6) hours as needed for Fever (if unable to take PO).  bisacodyl (DULCOLAX) 10 mg suppository Insert 10 mg into rectum daily as needed (no bowel movement for >3 days).  haloperidol (HALDOL) 2 mg/mL oral concentrate Take 0.5 mL by mouth four (4) times daily as needed (agitation).  hyoscyamine SL (LEVSIN/SL) 0.125 mg SL tablet Take 0.125 mg by mouth every four (4) hours as needed (secretions).  LORazepam (ATIVAN) 0.5 mg tablet Take 0.5 mg by mouth every six (6) hours as needed for Anxiety.  morphine (ROXANOL) 100 mg/5 mL (20 mg/mL) concentrated solution Take 0.25 mL by mouth every three (3) hours as needed (severe pain or shortness of breath).     
 prochlorperazine (COMPAZINE) 10 mg tablet Take 10 mg by mouth every six (6) hours as needed for Nausea.  bumetanide (BUMEX) 1 mg tablet Take 0.5 mg by mouth two (2) times a day.  senna (SENNA) 8.6 mg tablet Take 2 Tabs by mouth daily.  alclometasone (ACLOVATE) 0.05 % topical cream Apply  to affected area two (2) times a day. apply thin layer as directed to cancerous spot 15 g 2  
 fluticasone propion-salmeterol (ADVAIR/WIXELA) 500-50 mcg/dose diskus inhaler Take 1 Puff by inhalation every twelve (12) hours. 60 Each 2  
 albuterol (PROVENTIL HFA, VENTOLIN HFA, PROAIR HFA) 90 mcg/actuation inhaler Take 2 Puffs by inhalation every four (4) hours as needed for Wheezing. 1 Inhaler 0  
 potassium chloride SR (K-TAB) 20 mEq tablet Take 1 Tab by mouth two (2) times a day. 60 Tab 3  
 acetaminophen (TYLENOL) 325 mg tablet Take 2 Tabs by mouth three (3) times daily as needed for Pain. 120 Tab 11  
 ketoconazole (NIZORAL) 2 % topical cream Apply  to affected area two (2) times a day. 60 g 3  
 inhalational spacing device (E-Z SPACER) 1 Each by Does Not Apply route as needed (for use of inhaler). 1 Device 1  
 OXYGEN-AIR DELIVERY SYSTEMS 2 L by Nasal route continuous. Past History Past Medical History: 
Past Medical History:  
Diagnosis Date  Abdominal pain   
 nausea, bloating, frequent indigestion, ulcers  Arthritis  Cancer (HCC)   
 squamous cell - leg  Chronic pain  COPD  Dyspepsia and other specified disorders of function of stomach  Easy bruising  Hypercholesterolemia  Osteoporosis  Other ill-defined conditions(799.89)   
 blood transfusion with miscarriage  Other ill-defined conditions(799.89)   
 gout  Other ill-defined conditions(799.89) 1/13  
 hematuria, being evaluated by Dr Erick Wood  Thyroid disease Past Surgical History: 
Past Surgical History:  
Procedure Laterality Date  ABDOMEN SURGERY PROC UNLISTED    
 colon resection  HX APPENDECTOMY  HX BREAST BIOPSY    
 bilateral  
 Constitutional:  no fever, no chills  Eyes:  no discharge, no irritations, no pain, no redness, visual changes  Ears:  no ear pain, no ear drainage,  no hearing problems  Nose:  no nasal congestion, no nasal drainage  Mouth/Throat:  no hoarseness and no throat pain  Neck:  no stiffness, no pain, no lumps, no swollen glands  Cardiac:  no chest pain, no edema  Respiratory:  no cough, no shortness of breath  GI: no abdominal pain, no bloating, no constipation, no diarrhea, no nausea, no vomiting  :  no dysuria, no urinary frequency, no hematuria, no discharge  MSK:  no back pain, no msk pain, no weakness  NEURO:  no headache, no weakness, no numbness  Skin:  no lesions, no pruritis, no jaundice, no bruising, no rash  Psych:  no known mental health issues  Endocrine:  no diabetes, no thyroid issues  HX CHOLECYSTECTOMY  11/26/12  
 - LAPAROSOCPIC CHOLECYSTECTOMY WITH GRAMS POSSIBLE OPEN  
 HX DILATION AND CURETTAGE    
 HX GI    
 colon surgery [address]  
 hysterectomy-right ovary removed  HX HEART CATHETERIZATION    
 HX HEENT    
 tonsillectomy  HX ORTHOPAEDIC    
 carpal tunnel-left  IA EGD BALLOON DILATION ESOPHAGUS <30 MM DIAM  1/21/2013  IA EGD TRANSORAL BIOPSY SINGLE/MULTIPLE  1/21/2013  IA ERCP W/SPHINCTEROTOMY/PAPILLOTOMY  5/10/2013 Family History: 
Family History Problem Relation Age of Onset  Heart Disease Mother  Cancer Father   
     lung  Diabetes Father  Diabetes Son   
 
 
Social History: 
Social History Tobacco Use  Smoking status: Current Every Day Smoker Packs/day: 0.50 Years: 65.00 Pack years: 32.50  Smokeless tobacco: Never Used  Tobacco comment: currently about 1 cigarette a day Substance Use Topics  Alcohol use: No  
 Drug use: No  
  Types: Prescription, OTC Allergies: Allergies Allergen Reactions  Ciprofloxacin Other (comments) \"high feeling\"  Codeine Other (comments)  
  hallucinations  Cortisone Other (comments) Suicidal thoughts  Nsaids (Non-Steroidal Anti-Inflammatory Drug) Other (comments) Ulcer history  Other Medication Palpitations Iodine dye  Oxycodone Other (comments) Makes pt feel \"high\"  Penicillins Other (comments) Weak feeling  Vytorin 10-10 [Ezetimibe-Simvastatin] Other (comments) Abdominal swelling  Zantac [Ranitidine Hcl] Other (comments) Blisters Review of Systems Review of Systems Constitutional: Negative for activity change, appetite change, chills, fever and unexpected weight change. HENT: Negative for congestion. Eyes: Negative for pain and visual disturbance. Respiratory: Positive for cough, chest tightness, shortness of breath and wheezing. Cardiovascular: Positive for chest pain and leg swelling. Negative for palpitations. Gastrointestinal: Negative for abdominal pain, diarrhea, nausea and vomiting. Genitourinary: Negative for dysuria. Musculoskeletal: Negative for back pain. Skin: Negative for rash. Neurological: Negative for headaches. Physical Exam  
Physical Exam  
Constitutional: She is oriented to person, place, and time. She appears well-developed and well-nourished. Elderly female occurring in mild to moderate respiratory distress with elevated heart rate. HENT:  
Head: Normocephalic and atraumatic. Mouth/Throat: Oropharynx is clear and moist.  
Eyes: Pupils are equal, round, and reactive to light. Conjunctivae and EOM are normal. Right eye exhibits no discharge. Left eye exhibits no discharge. Neck: Normal range of motion. Neck supple. Cardiovascular: Regular rhythm and normal heart sounds. No murmur heard. Tachycardia approximately 100 Pulmonary/Chest: Accessory muscle usage present. Tachypnea noted. She is in respiratory distress. She has wheezes (Diffuse with prolonged end expiratory phase). She has no rales. Abdominal: Soft. Bowel sounds are normal. She exhibits no distension. There is no tenderness. Musculoskeletal: Normal range of motion. She exhibits edema (2+ doughy edema with legs wrapped in compression stockings). Neurological: She is alert and oriented to person, place, and time. No cranial nerve deficit. She exhibits normal muscle tone. Skin: Skin is warm and dry. No rash noted. She is not diaphoretic. Nursing note and vitals reviewed. Diagnostic Study Results Labs - Recent Results (from the past 12 hour(s)) CBC WITH AUTOMATED DIFF Collection Time: 09/24/19 12:12 PM  
Result Value Ref Range WBC 6.0 3.4 - 10.8 x10E3/uL  
 RBC 3.10 (L) 3.77 - 5.28 x10E6/uL HGB 6.5 (LL) 11.1 - 15.9 g/dL HCT 20.8 (L) 34.0 - 46.6 %  MCV 67 (L) 79 - 97 fL  
 MCH 21.0 (L) 26.6 - 33.0 pg  
 MCHC 31.3 (L) 31.5 - 35.7 g/dL  
 RDW 19.6 (H) 12.3 - 15.4 % PLATELET 645 601 - 869 x10E3/uL NEUTROPHILS 74 Not Estab. % Lymphocytes 15 Not Estab. % MONOCYTES 7 Not Estab. % EOSINOPHILS 2 Not Estab. % BASOPHILS 2 Not Estab. %  
 ABS. NEUTROPHILS 4.4 1.4 - 7.0 x10E3/uL Abs Lymphocytes 0.9 0.7 - 3.1 x10E3/uL  
 ABS. MONOCYTES 0.4 0.1 - 0.9 x10E3/uL  
 ABS. EOSINOPHILS 0.1 0.0 - 0.4 x10E3/uL  
 ABS. BASOPHILS 0.1 0.0 - 0.2 x10E3/uL Hematology comments: Note: METABOLIC PANEL, COMPREHENSIVE Collection Time: 09/24/19 12:12 PM  
Result Value Ref Range Glucose 130 (H) 65 - 99 mg/dL BUN 10 8 - 27 mg/dL Creatinine 0.73 0.57 - 1.00 mg/dL GFR est non-AA 77 >59 mL/min/1.73 GFR est AA 89 >59 mL/min/1.73  
 BUN/Creatinine ratio 14 12 - 28 Sodium 142 134 - 144 mmol/L Potassium 3.4 (L) 3.5 - 5.2 mmol/L Chloride 99 96 - 106 mmol/L  
 CO2 33 (H) 20 - 29 mmol/L Calcium 8.8 8.7 - 10.3 mg/dL Protein, total 4.9 (L) 6.0 - 8.5 g/dL Albumin 2.7 (L) 3.5 - 4.7 g/dL GLOBULIN, TOTAL 2.2 1.5 - 4.5 g/dL A-G Ratio 1.2 1.2 - 2.2 Bilirubin, total 1.4 (H) 0.0 - 1.2 mg/dL Alk. phosphatase 100 39 - 117 IU/L  
 AST (SGOT) 17 0 - 40 IU/L  
 ALT (SGPT) 5 0 - 32 IU/L MAGNESIUM Collection Time: 09/24/19 12:12 PM  
Result Value Ref Range Magnesium 1.5 (L) 1.6 - 2.3 mg/dL NT-PRO BNP Collection Time: 09/24/19 12:12 PM  
Result Value Ref Range PROBNP 339 0 - 738 pg/mL CBC WITH AUTOMATED DIFF Collection Time: 09/24/19  8:24 PM  
Result Value Ref Range WBC 8.4 3.6 - 11.0 K/uL  
 RBC 3.30 (L) 3.80 - 5.20 M/uL HGB 7.0 (L) 11.5 - 16.0 g/dL HCT 24.6 (L) 35.0 - 47.0 % MCV 74.5 (L) 80.0 - 99.0 FL  
 MCH 21.2 (L) 26.0 - 34.0 PG  
 MCHC 28.5 (L) 30.0 - 36.5 g/dL  
 RDW 19.7 (H) 11.5 - 14.5 % PLATELET 945 154 - 144 K/uL MPV 10.9 8.9 - 12.9 FL  
 NRBC 0.0 0  WBC ABSOLUTE NRBC 0.00 0.00 - 0.01 K/uL NEUTROPHILS 69 32 - 75 % LYMPHOCYTES 10 (L) 12 - 49 % MONOCYTES 17 (H) 5 - 13 % EOSINOPHILS 1 0 - 7 % BASOPHILS 2 (H) 0 - 1 % IMMATURE GRANULOCYTES 1 (H) 0.0 - 0.5 % ABS. NEUTROPHILS 5.8 1.8 - 8.0 K/UL  
 ABS. LYMPHOCYTES 0.8 0.8 - 3.5 K/UL  
 ABS. MONOCYTES 1.4 (H) 0.0 - 1.0 K/UL  
 ABS. EOSINOPHILS 0.1 0.0 - 0.4 K/UL  
 ABS. BASOPHILS 0.2 (H) 0.0 - 0.1 K/UL  
 ABS. IMM. GRANS. 0.1 (H) 0.00 - 0.04 K/UL  
 DF AUTOMATED    
 RBC COMMENTS ANISOCYTOSIS 2+ 
    
 RBC COMMENTS HYPOCHROMIA 2+ 
    
 RBC COMMENTS OVALOCYTES PRESENT 
    
 RBC COMMENTS MICROCYTOSIS 
PRESENT 
    
 RBC COMMENTS POLYCHROMASIA PRESENT 
    
METABOLIC PANEL, COMPREHENSIVE Collection Time: 09/24/19  8:24 PM  
Result Value Ref Range Sodium 142 136 - 145 mmol/L Potassium 3.0 (L) 3.5 - 5.1 mmol/L Chloride 104 97 - 108 mmol/L  
 CO2 35 (H) 21 - 32 mmol/L Anion gap 3 (L) 5 - 15 mmol/L Glucose 109 (H) 65 - 100 mg/dL BUN 13 6 - 20 MG/DL Creatinine 0.95 0.55 - 1.02 MG/DL  
 BUN/Creatinine ratio 14 12 - 20 GFR est AA >60 >60 ml/min/1.73m2 GFR est non-AA 56 (L) >60 ml/min/1.73m2 Calcium 8.2 (L) 8.5 - 10.1 MG/DL Bilirubin, total 1.3 (H) 0.2 - 1.0 MG/DL  
 ALT (SGPT) 8 (L) 12 - 78 U/L  
 AST (SGOT) 20 15 - 37 U/L Alk. phosphatase 129 (H) 45 - 117 U/L Protein, total 6.0 (L) 6.4 - 8.2 g/dL Albumin 2.4 (L) 3.5 - 5.0 g/dL Globulin 3.6 2.0 - 4.0 g/dL A-G Ratio 0.7 (L) 1.1 - 2.2 TYPE & SCREEN Collection Time: 09/24/19  8:24 PM  
Result Value Ref Range Crossmatch Expiration 09/27/2019 ABO/Rh(D) O POSITIVE Antibody screen NEG Unit number [id number] Blood component type RC LR,2 Unit division 00 Status of unit ALLOCATED Crossmatch result Compatible PROTHROMBIN TIME + INR Collection Time: 09/24/19  8:24 PM  
Result Value Ref Range INR 1.1 0.9 - 1.1 Prothrombin time 11.7 (H) 9.0 - 11.1 sec D DIMER Collection Time: 09/24/19  8:24 PM  
Result Value Ref Range D-dimer 3.03 (H) 0.00 - 0.65 mg/L FEU Radiologic Studies -  
CTA CHEST W OR W WO CONT Final Result IMPRESSION:   
1. Unchanged thoracic aortic aneurysm. .  
2. Increased moderate left and mild right pleural effusions with associated  
increased bibasilar atelectasis, greater on the left. 3. Unchanged irregular semisolid area of opacification in the anterior left  
upper lobe measuring 1.4 cm. Recommend follow-up chest CT in 6 months. 4. Hepatic cirrhosis XR CHEST PORT Final Result Impression:  
Unchanged mild cardiomegaly and mild left pleural effusion. CT Results  (Last 48 hours) 09/24/19 2101  CTA CHEST W OR W WO CONT Final result Impression:  IMPRESSION:   
1. Unchanged thoracic aortic aneurysm. .  
2. Increased moderate left and mild right pleural effusions with associated  
increased bibasilar atelectasis, greater on the left. 3. Unchanged irregular semisolid area of opacification in the anterior left  
upper lobe measuring 1.4 cm. Recommend follow-up chest CT in 6 months. 4. Hepatic cirrhosis Narrative:  EXAM: CTA CHEST W OR W WO CONT INDICATION: cp hx aneurysm with drop in hemoglobin COMPARISON: 5/17/2019. CONTRAST: 100 mL of Isovue-370. TECHNIQUE:   
Precontrast  images were obtained to localize the volume for acquisition. Multislice helical CT arteriography was performed from the diaphragm to the  
thoracic inlet during uneventful rapid bolus intravenous contrast  
administration. Lung and soft tissue windows were generated. Coronal and  
sagittal images were generated and 3D post processing consisting of coronal  
maximum intensity images was performed. CT dose reduction was achieved through  
use of a standardized protocol tailored for this examination and automatic  
exposure control for dose modulation. FINDINGS:  
THYROID: No nodule. MEDIASTINUM: No mass or lymphadenopathy. GAYLA: No mass or lymphadenopathy. THORACIC AORTA: There is a thoracic aortic aneurysm. The ascending aorta  
measures a maximum of 5.9 cm, which is unchanged. The aortic arch measures 3.8  
cm, which is unchanged. The mid descending thoracic aorta measures 4.5 cm, which  
is unchanged. The aorta at the diaphragmatic outlet measures 3.5 cm, which is  
unchanged No evidence of dissection. PULMONARY ARTERIES: Normal in caliber. The pulmonary arteries are adequately  
enhanced. No evidence of pulmonary embolus. TRACHEA/BRONCHI: Patent. ESOPHAGUS: No wall thickening or dilatation. HEART: There is narrowing of the left ventricle secondary to the aortic  
aneurysm. PLEURA: There are increased moderate left and mild right pleural effusions. LUNGS: There is bibasilar atelectasis, greater on the left. There is a small  
area of irregular opacification again seen in the anterior left upper lobe  
measuring 1.4 cm. This is semisolid with areas of lobulation. INCIDENTALLY IMAGED UPPER ABDOMEN: The liver is small in size. There is relative  
prominence of left lateral hepatic lobe and caudate lobe. This is known as a  
cirrhotic morphology. There is subtle nodularity along the capsule. The patient  
status post cholecystectomy. BONES: No destructive bone lesion. CXR Results  (Last 48 hours) 09/24/19 1957  XR CHEST PORT Final result Impression:  Impression:  
Unchanged mild cardiomegaly and mild left pleural effusion. Narrative:  Chest portable AP History: Chest pain. History of COPD. Comparison: 5/17/2019 Findings:  Patient body habitus reduces image quality. The heart is mildly  
enlarged, but unchanged. There is unchanged mild left pleural effusion. No  
significant right pleural effusion is not seen. No pneumothorax. Medical Decision Making I am the first provider for this patient. I reviewed the vital signs, available nursing notes, past medical history, past surgical history, family history and social history. Vital Signs-Reviewed the patient's vital signs. Patient Vitals for the past 12 hrs: 
 Temp Pulse Resp BP SpO2  
09/24/19 2245  82 29 124/56 98 %  
09/24/19 1910 98.6 °F (37 °C) (!) 103 20 143/64 97 % Pulse Oximetry Analysis - 97% on RA Cardiac Monitor:  
Rate: 103bpm 
Rhythm: Sinus Tachycardia Records Reviewed: Nursing Notes, Old Medical Records, Previous electrocardiograms, Ambulance Run Sheet, Previous Radiology Studies and Previous Laboratory Studies Provider Notes (Medical Decision Making): MDM: Elderly female presenting with increased work of breathing and wheezes with peripheral edema requesting a diuretic who is been sent for a change in baseline anemia and concern for possible dissection given her chest pain. Currently she is hemodynamically stable without any chest pain but we will send her for CT is as though she is in the emergency room also will send a type and screen and monitor for any GI bleeding. ED Course:  
Initial assessment performed. The patients presenting problems have been discussed, and they are in agreement with the care plan formulated and outlined with them. I have encouraged them to ask questions as they arise throughout their visit. PROGRESS NOTE: 
10 PM 
Pt appears improved with increased air movement. Vital signs are stable. CT angios without any acute change to her aneurysm. She does have bilateral effusions with possible upper lobe abnormality. Will initiate blood transfusion in the ER as patient is discussed with hospitalist for possible observation admission. CONSULT NOTE:  
11:00 PM 
Clover Portillo MD spoke with Dr. Yasmine Springer Specialty: Hospitalist 
Discussed pt's hx, disposition, and available diagnostic and imaging results. Reviewed care plans. Consultant agrees with plans as outlined. He will evaluate the patient for possible admission. 11 PM 
Patient and her daughter have been updated regarding results, recommendation of transfusion and continued observation. Patient is diuresed approximately 1 L thus far after the IV Lasix. Critical Care Time: CRITICAL CARE NOTE : 
11:15 PM 
IMPENDING DETERIORATION -Airway, Respiratory, Cardiovascular, CNS, Metabolic, Renal and Hepatic ASSOCIATED RISK FACTORS - Hypotension, Hypoxia, Dysrhythmia and Metabolic changes MANAGEMENT- Bedside Assessment and Supervision of Care INTERPRETATION -  CT Scan and Blood Pressure INTERVENTIONS - hemodynamic mngmt, blood transfusion and IV diuretics CASE REVIEW - Hospitalist, Nursing and Family TREATMENT RESPONSE -Stable PERFORMED BY - Self NOTES   : 
I have spent 45 minutes of critical care time involved in lab review, consultations with specialist, family decision- making, bedside attention and documentation. During this entire length of time I was immediately available to the patient. Daniel Coma. MD Yashira 
 
 
 
Diagnosis Clinical Impression: 1. Anemia, unspecified type 2. Dyspnea, unspecified type PLAN: 
1. Admit to the internal medicine service for further management and care Please note, this dictation was completed with [employer], the computer voice recognition software. Quite often unanticipated grammatical, syntax, homophones, and other interpretive errors are inadvertently transcribed by the computer software. Please disregard these errors. Please excuse any errors that have escaped final proof reading. Constitutional:  no fever, no chills  Eyes:  no discharge, no irritations, no pain, no redness, visual changes  Ears:  no ear pain, no ear drainage,  no hearing problems  Nose:  no nasal congestion, no nasal drainage  Mouth/Throat:  no hoarseness and no throat pain  Neck:  no stiffness, no pain, no lumps, no swollen glands  Cardiac:  no chest pain, no edema  Respiratory:  no cough, no shortness of breath  GI: no abdominal pain, no bloating, no constipation, no diarrhea, no nausea, no vomiting  :  no dysuria, no urinary frequency, no hematuria, no discharge  MSK:  no back pain, no msk pain, no weakness  NEURO:  no headache, no weakness, no numbness  Skin:  no lesions, no pruritis, no jaundice, no bruising, no rash

## 2023-10-24 NOTE — ED PROVIDER NOTE - ATTENDING APP SHARED VISIT CONTRIBUTION OF CARE
Pt is a 69 yo male with PMH of heroin use who is presenting s/p overdose at the shelter. Pt reportedly was given 8mg IN narcan. He responded to the narcan. Pt does endorse using one bag of heroin when he normally uses a half bag. Reports that he defecated on himself. Denies other medical problems. Denies other complaints including chest pain, SOB, abd pain, nausea, or vomiting.    Patient seen in bed [facility] of [facility].  He declined admission and declined observation.  Risks, including death or life changing morbidity, discussed with patient and he still requests to be discharged against medical advice.

## 2023-10-24 NOTE — ED PROVIDER NOTE - NSFOLLOWUPCLINICS_GEN_ALL_ED_FT
[facility] Urology Clinic  Urology  [address] [place], [address]  [place], [place] [address]  Phone: [phone]  Fax:

## 2023-10-24 NOTE — ED PROVIDER NOTE - NSFOLLOWUPINSTRUCTIONS_ED_ALL_ED_FT
Fever, Adult  A person using an oral thermometer.  A person holding a temporal thermometer to another person's forehead.  A fever is an increase in the body's temperature. It is usually defined as a temperature of 100.4°F (38°C) or higher. Brief mild or moderate fevers generally have no long-term effects, and they often do not need treatment. Moderate or high fevers may make you feel uncomfortable and can sometimes be a sign of a serious illness or disease. The sweating that may occur with repeated or prolonged fever may also cause a loss of fluid in the body (dehydration).    Fever is confirmed by taking a temperature with a thermometer. A measured temperature can vary with:  Age.  Time of day.  Where in the body you take the temperature. Readings may vary if you place the thermometer:  In the mouth (oral).  In the rectum (rectal).  In the ear (tympanic).  Under the arm (axillary).  On the forehead (temporal).  Follow these instructions at home:  Medicines    Take over-the counter and prescription medicines only as told by your health care provider. Follow the dosing instructions carefully.  If you were prescribed an antibiotic medicine, take it as told by your health care provider. Do not stop taking the antibiotic even if you start to feel better.  General instructions    Watch your condition for any changes. Let your health care provider know about them.  Rest as needed.  Drink enough fluid to keep your urine pale yellow. This helps to prevent dehydration.  Sponge yourself or bathe with room-temperature water to help reduce your body temperature as needed. Do not use ice water.  Do not use too many blankets or wear clothes that are too heavy.  If your fever may be caused by an infection that spreads from person to person (is contagious), such as a cold or the flu, you should stay home from work and public gatherings for at least 24 hours after your fever is gone. Your fever should be gone without the need to use medicines.  Contact a health care provider if:  You vomit.  You cannot eat or drink without vomiting.  You have diarrhea.  You have pain when you urinate.  Your symptoms do not improve with treatment.  You develop new symptoms.  You develop excessive weakness.  Get help right away if:  You have shortness of breath or have trouble breathing.  You are dizzy or you faint.  You are disoriented or confused.  You develop signs of dehydration, such as:  Dark urine, very little urine, or no urine.  Cracked lips.  Dry mouth.  Sunken eyes.  Sleepiness.  Weakness.  You develop severe pain in your abdomen.  You have persistent vomiting or diarrhea.  You develop a skin rash.  Your symptoms suddenly get worse.  Summary  A fever is an increase in the body's temperature. It is usually defined as a temperature of 100.4°F (38°C) or higher. Moderate or high fevers can sometimes be a sign of a serious illness or disease. The sweating that may occur with repeated or prolonged fever may also cause dehydration.  Pay attention to any changes in your symptoms and contact your health care provider if your symptoms do not improve with treatment.  Take over-the counter and prescription medicines only as told by your health care provider. Follow the dosing instructions carefully.  If your fever is from an infection that may be contagious, such as cold or flu, you should stay home from work and public gatherings for at least 24 hours after your fever is gone. Your fever should be gone without the need to use medicines.  Get help right away if you develop signs of dehydration, such as dark urine, cracked lips, dry mouth, sunken eyes, sleepiness, or weakness.  This information is not intended to replace advice given to you by your health care provider. Make sure you discuss any questions you have with your health care provider.

## 2023-10-30 LAB
CULTURE RESULTS: SIGNIFICANT CHANGE UP
CULTURE RESULTS: SIGNIFICANT CHANGE UP
SPECIMEN SOURCE: SIGNIFICANT CHANGE UP
SPECIMEN SOURCE: SIGNIFICANT CHANGE UP

## 2024-01-27 NOTE — ED ADULT NURSE NOTE - NS PRO PASSIVE SMOKE EXP
"Occupational Therapy   Treatment/ Discharge Summary    Name: Kanchan Downing  MRN: [id number]  Admit Date: 1/20/2024  Admitting Diagnosis:  Traumatic subdural hematoma (SDH)    General Precautions: Standard, fall, aspiration   Orthopedic Precautions: N/A   Braces: N/A    Recommendations:     Discharge Recommendations:  home health OT  Level of Assistance Recommended at Discharge: 24 hours supervision for safety in performing ADL's and home management tasks  Discharge Equipment Recommendations: none  Barriers to discharge:  None    Assessment:     Kanchan Downing is a 81 y.o. female with a medical diagnosis of Traumatic subdural hematoma (SDH). Pt tolerated session well and without incident and shows excellent motivation and potential to improve, but the pt continues to require assistance to perform self-care tasks and mobility. Pt strengths include Following multi-step tasks and Attention to task and PLOF, Motivation, and Willingness to participate. Pt improved performance of all ADLs and FM. Pt appropriate for d/c from SNF OT. Pt would benefit from OT services in next level of care. Performance deficits affecting function are weakness, impaired endurance, impaired self care skills, impaired functional mobility, gait instability, impaired balance, impaired cognition, decreased coordination, decreased safety awareness, decreased lower extremity function, decreased upper extremity function.     Rehab Potential is good    Activity tolerance:  Good    Plan:     Pt to d/c from OT services 1/27/2024 and SNF 1/29/2024. Pt appropriate for next level of care.              Subjective     Communicated with: CHRISTOPHER prior to session.     "I am concerned about going home by myself."    Pain/Comfort:  Pain Rating 1: 0/10  Pain Rating Post-Intervention 1: 0/10    Patient's cultural, spiritual, Jainism conflicts given the current situation:  no    Objective:     Patient found up in chair with  (no lines) upon OT entry to room.    Eating "   Was the activity attempted? Yes   Was the activity done independently? Yes   Was adaptive equipment used? No   CARE Score - Eating 6   Oral Hygiene   Was the activity attempted? Yes   Was the activity done independently? Yes  (Mod I seated in WC per pt report)   Was adaptive equipment used? Yes   CARE Score - Oral Hygiene 6   Toileting Hygiene   Was the activity attempted? Yes   Was the activity done independently? No   Assistance Needed Supervision  (SPV /c RW per pt/PCT report)   Was adaptive equipment used? Yes   CARE Score - Toileting Hygiene 4   Shower/Bathe Self   Was the activity attempted? Yes   Was the activity done independently? No   Assistance Needed Supervision  (SPV in shower /c use of SC/grab bards)   Was adaptive equipment used? Yes   CARE Score - Shower/Bathe Self 4   Upper Body Dressing   Was the activity attempted? Yes   Was the activity done independently? No   Assistance Needed Setup / clean-up   Was adaptive equipment used? No   CARE Score - Upper Body Dressing 5   Lower Body Dressing   Was the activity attempted? Yes   Was the activity done independently? No   Assistance Needed Supervision  (SPV to doff/don underwear and pants /c BLE threaded while seated and managed over hips in standing /c RW)   Was adaptive equipment used? Yes   CARE Score - Lower Body Dressing 4   Putting On/Taking Off Footwear   Was the activity attempted? Yes   Was the activity done independently? Yes  (Mod I to doff/don socks)   Was adaptive equipment used? No   CARE Score - Putting On/Taking Off Footwear 6   Personal Hygiene   Was the activity attempted? Yes   Was the activity done independently? Yes  (Mod I seated in WC to wash hands seated at sink per pct/pt report)   Was adaptive equipment used? Yes   CARE Score - Personal Hygiene 6   Sit to Stand   Was the activity attempted? Yes   Was the activity done independently? No   Assistance Needed Supervision  (SBA /c RW)   Was adaptive equipment used? Yes   CARE Score  - Sit to Stand 4   Toilet Transfer   Was the activity attempted? Yes   Was the activity done independently? No   Assistance Needed Supervision  (SBA /c RW per PCT/pt report)   Was adaptive equipment used? Yes   CARE Score - Toilet Transfer 4   Tub/Shower Transfer   Was the activity attempted? Yes   Was the activity done independently? No   Assistance Needed Supervision  (SBA /c RW/grab bar)   Was adaptive equipment used? Yes   CARE Score - Tub/Shower Transfer 4       AMPAC 6 Click ADL: 18        Treatment & Education:  Pt educated on POC, role of OT, and safety. Pt performed tasks to improve safety and independence in functional tasks and ADLs as mentioned above.       Patient left up in chair with call button in reach and all needs met    GOALS:   Multidisciplinary Problems       Occupational Therapy Goals          Problem: Occupational Therapy    Goal Priority Disciplines Outcome Interventions   Occupational Therapy Goal     OT, PT/OT Adequate for Care Transition    Description: Goals to be met by: [id number]/29395     Patient will increase functional independence with ADLs by performing:    UE Dressing with [place]- Not met  LE Dressing with [place]- Not met  Grooming while standing with [place]- Not met  Toileting from toilet with [place] for hygiene and clothing management- Not met   Bathing from  shower chair/bench with [place]- Not met  Step transfer with [place]- Not met  Toilet transfer to toilet with [place]- Not met                                     Time Tracking:     OT Date of Treatment: 01/27/24  OT Start Time: 1013    OT Stop Time: 1108  OT Total Time (min): 55 min    Billable Minutes:Self Care/Home Management 45  Therapeutic Activity 10    1/27/2024   Unknown

## 2024-02-13 ENCOUNTER — EMERGENCY (EMERGENCY)
Facility: HOSPITAL | Age: 69
LOS: 1 days | Discharge: HOME | End: 2024-02-13
Attending: EMERGENCY MEDICINE | Admitting: EMERGENCY MEDICINE
Payer: MEDICARE

## 2024-02-13 VITALS
SYSTOLIC BLOOD PRESSURE: 147 MMHG | DIASTOLIC BLOOD PRESSURE: 73 MMHG | OXYGEN SATURATION: 96 % | TEMPERATURE: 97 F | RESPIRATION RATE: 18 BRPM | HEART RATE: 80 BPM

## 2024-02-13 DIAGNOSIS — Z59.00 HOMELESSNESS UNSPECIFIED: ICD-10-CM

## 2024-02-13 DIAGNOSIS — F11.10 OPIOID ABUSE, UNCOMPLICATED: ICD-10-CM

## 2024-02-13 DIAGNOSIS — Z20.822 CONTACT WITH AND (SUSPECTED) EXPOSURE TO COVID-19: ICD-10-CM

## 2024-02-13 DIAGNOSIS — B34.9 VIRAL INFECTION, UNSPECIFIED: ICD-10-CM

## 2024-02-13 DIAGNOSIS — Z87.898 PERSONAL HISTORY OF OTHER SPECIFIED CONDITIONS: Chronic | ICD-10-CM

## 2024-02-13 DIAGNOSIS — R52 PAIN, UNSPECIFIED: ICD-10-CM

## 2024-02-13 DIAGNOSIS — K40.90 UNILATERAL INGUINAL HERNIA, WITHOUT OBSTRUCTION OR GANGRENE, NOT SPECIFIED AS RECURRENT: ICD-10-CM

## 2024-02-13 LAB
FLUAV AG NPH QL: SIGNIFICANT CHANGE UP
FLUBV AG NPH QL: SIGNIFICANT CHANGE UP
RSV RNA NPH QL NAA+NON-PROBE: SIGNIFICANT CHANGE UP
SARS-COV-2 RNA SPEC QL NAA+PROBE: SIGNIFICANT CHANGE UP

## 2024-02-13 PROCEDURE — 99283 EMERGENCY DEPT VISIT LOW MDM: CPT

## 2024-02-13 PROCEDURE — 99284 EMERGENCY DEPT VISIT MOD MDM: CPT

## 2024-02-13 PROCEDURE — 87637 SARSCOV2&INF A&B&RSV AMP PRB: CPT

## 2024-02-13 PROCEDURE — 99053 MED SERV 10PM-8AM 24 HR FAC: CPT

## 2024-02-13 RX ORDER — IBUPROFEN 200 MG
600 TABLET ORAL ONCE
Refills: 0 | Status: COMPLETED | OUTPATIENT
Start: 2024-02-13 | End: 2024-02-13

## 2024-02-13 RX ADMIN — Medication 600 MILLIGRAM(S): at 07:58

## 2024-02-13 SDOH — ECONOMIC STABILITY - HOUSING INSECURITY: HOMELESSNESS UNSPECIFIED: Z59.00

## 2024-02-13 NOTE — ED PROVIDER NOTE - PATIENT PORTAL LINK FT
You can access the FollowMyHealth Patient Portal offered by [facility] by registering at the following website: [email or web address]. By joining [facility] FollowMyHealth portal, you will also be able to view your health information using other applications (apps) compatible with our system.

## 2024-02-13 NOTE — ED PROVIDER NOTE - PHYSICAL EXAMINATION
CONSTITUTIONAL: Awake, alert.  Nontoxic, no acute distress.  Slightly disheveled in appearance with foul odor    HEAD: Normocephalic, atraumatic.    EYES: Conjunctivae clear without exudates or hemorrhage. Sclera is non-icteric.    ENT:   Ears: External ear normal appearing without tenderness. No mastoid tenderness, swelling, erythema.  Nose: Normal appearing nose, nasal mucosa is pink and moist. The nasal septum is midline, no septal hematoma. +dried yellow mucus present to nares.  Throat: Oral mucosa is pink and moist with poor dentition.  Tongue normal in appearance without lesions and with good symmetrical movement. No buccal nodules or lesions are noted. The pharynx is normal in appearance without tonsillar swelling or exudates.  Uvula midline.  No trismus.  No submandibular/ submental lymphadenopathy.    NECK: supple, trachea midline.    HEART:  Normal rate, regular rhythm.  Heart sounds S1, S2.  No murmurs, rubs or gallops.    LUNGS:  No acute respiratory distress.  Non-tachypneic and non-labored.  Lungs are clear bilaterally with good aeration.  No wheezing, rales, rhonchi.    ABDOMEN: Normal appearing skin without lesions, rashes.  Normal bowel sounds x 4.  Soft, non-distended, non-tender in all four quadrants. No rebound or guarding. No pulsatile abdominal mass.   No CVA tenderness b/l.    GENITAL: +large nontender reducible hernia to L scrotum    MUSCULOSKELETAL:  Moving all extremities without issue.    SKIN: Skin in warm, dry and intact without rashes or lesions.  Appropriate color for ethnicity.    NEUROLOGICAL:  Patient is alert, oriented x person, place and time.    PSYCH: Appropriate mood and affect. Good judgment and insight.

## 2024-02-13 NOTE — ED ADULT NURSE NOTE - NSFALLUNIVINTERV_ED_ALL_ED
Bed/Stretcher in lowest position, wheels locked, appropriate side rails in place/Call bell, personal items and telephone in reach/Instruct patient to call for assistance before getting out of bed/chair/stretcher/Non-slip footwear applied when patient is off stretcher/[place] to call system/Physically safe environment - no spills, clutter or unnecessary equipment/Purposeful proactive rounding/Room/bathroom lighting operational, light cord in reach

## 2024-02-13 NOTE — ED PROVIDER NOTE - NSFOLLOWUPINSTRUCTIONS_ED_ALL_ED_FT
Influenza, Adult  Influenza, also called "the flu," is a viral infection that mainly affects the respiratory tract. This includes the lungs, nose, and throat. The flu spreads easily from person to person (is contagious). It causes common cold symptoms, along with high fever and body aches.    What are the causes?  This condition is caused by the influenza virus. You can get the virus by:  Breathing in droplets that are in the air from an infected person's cough or sneeze.  Touching something that has the virus on it (has been contaminated) and then touching your mouth, nose, or eyes.  What increases the risk?  The following factors may make you more likely to get the flu:  Not washing or sanitizing your hands often.  Having close contact with many people during cold and flu season.  Touching your mouth, eyes, or nose without first washing or sanitizing your hands.  Not getting an annual flu shot.  You may have a higher risk for the flu, including serious problems, such as a lung infection (pneumonia), if you:  Are older than 65.  Are pregnant.  Have a weakened disease-fighting system (immune system). This includes people who have HIV or AIDS, are on chemotherapy, or are taking medicines that reduce (suppress) the immune system.  Have a long-term (chronic) illness, such as heart disease, kidney disease, diabetes, or lung disease.  Have a liver disorder.  Are severely overweight (morbidly obese).  Have anemia.  Have asthma.  What are the signs or symptoms?  Symptoms of this condition usually begin suddenly and last 4–14 days. These may include:  Fever and chills.  Headaches, body aches, or muscle aches.  Sore throat.  Cough.  Runny or stuffy (congested) nose.  Chest discomfort.  Poor appetite.  Weakness or fatigue.  Dizziness.  Nausea or vomiting.  How is this diagnosed?  This condition may be diagnosed based on:  Your symptoms and medical history.  A physical exam.  Swabbing your nose or throat and testing the fluid for the influenza virus.  How is this treated?  If the flu is diagnosed early, you can be treated with antiviral medicine that is given by mouth (orally) or through an IV. This can help reduce how severe the illness is and how long it lasts.    Taking care of yourself at home can help relieve symptoms. Your health care provider may recommend:  Taking over-the-counter medicines.  Drinking plenty of fluids.  In many cases, the flu goes away on its own. If you have severe symptoms or complications, you may be treated in a hospital.    Follow these instructions at home:  Activity    Rest as needed and get plenty of sleep.  Stay home from work or school as told by your health care provider. Unless you are visiting your health care provider, avoid leaving home until your fever has been gone for 24 hours without taking medicine.  Eating and drinking    Take an oral rehydration solution (ORS). This is a drink that is sold at pharmacies and retail stores.  Drink enough fluid to keep your urine pale yellow.  Drink clear fluids in small amounts as you are able. Clear fluids include water, ice chips, fruit juice mixed with water, and low-calorie sports drinks.  Eat bland, easy-to-digest foods in small amounts as you are able. These foods include bananas, applesauce, rice, lean meats, toast, and crackers.  Avoid drinking fluids that contain a lot of sugar or caffeine, such as energy drinks, regular sports drinks, and soda.  Avoid alcohol.  Avoid spicy or fatty foods.  General instructions        Take over-the-counter and prescription medicines only as told by your health care provider.  Use a cool mist humidifier to add humidity to the air in your home. This can make it easier to breathe.  When using a cool mist humidifier, clean it daily. Empty the water and replace it with clean water.  Cover your mouth and nose when you cough or sneeze.  Wash your hands with soap and water often and for at least 20 seconds, especially after you cough or sneeze. If soap and water are not available, use alcohol-based hand .  Keep all follow-up visits. This is important.  How is this prevented?    Get an annual flu shot. This is usually available in late summer, fall, or winter. Ask your health care provider when you should get your flu shot.  Avoid contact with people who are sick during cold and flu season. This is generally fall and winter.  Contact a health care provider if:  You develop new symptoms.  You have:  Chest pain.  Diarrhea.  A fever.  Your cough gets worse.  You produce more mucus.  You feel nauseous or you vomit.  Get help right away if you:  Develop shortness of breath or have difficulty breathing.  Have skin or nails that turn a bluish color.  Have severe pain or stiffness in your neck.  Develop a sudden headache or sudden pain in your face or ear.  Cannot eat or drink without vomiting.  These symptoms may represent a serious problem that is an emergency. Do not wait to see if the symptoms will go away. Get medical help right away. Call your local emergency services (911 in the [place] Do not drive yourself to the hospital.    Summary  Influenza, also called "the flu," is a viral infection that primarily affects your respiratory tract.  Symptoms of the flu usually begin suddenly and last 4–14 days.  Getting an annual flu shot is the best way to prevent getting the flu.  Stay home from work or school as told by your health care provider. Unless you are visiting your health care provider, avoid leaving home until your fever has been gone for 24 hours without taking medicine.  Keep all follow-up visits. This is important.  -----      Hernia, Adult      A hernia is the bulging of an organ or tissue through a weak spot in the muscles of the abdomen. Hernias develop most often near the belly button (navel) or the area where the leg meets the lower abdomen (groin).    Common types of hernias include:  Incisional hernia. This type bulges through a scar from an abdominal surgery.  Umbilical hernia. This type develops near the navel.  Inguinal hernia. This type develops in the groin or scrotum.  Femoral hernia. This type develops below the groin, in the upper thigh area.  Hiatal hernia. This type occurs when part of the stomach slides above the muscle that separates the abdomen from the chest (diaphragm).  What are the causes?  This condition may be caused by:  Heavy lifting.  Coughing over a long period of time.  Straining to have a bowel movement. Constipation can lead to straining.  An incision made during abdominal surgery.  A physical problem that is present at birth (congenital defect).  Being overweight or obese.  Smoking.  Excess fluid in the abdomen.  Undescended testicles in males.  What are the signs or symptoms?  The main symptom is a skin-colored, rounded bulge in the area of the hernia. However, a bulge may not always be present. It may grow bigger or be more visible when you cough or strain (such as when lifting something heavy).    A hernia that can be pushed back into the abdomen (is reducible) rarely causes pain. A hernia that cannot be pushed back into the abdomen (is incarcerated) may lose its blood supply (become strangulated). A hernia that is incarcerated may cause:  Pain.  Fever.  Nausea and vomiting.  Swelling.  Constipation.  How is this diagnosed?  A hernia may be diagnosed based on:  Your symptoms and medical history.  A physical exam. Your health care provider may ask you to cough or move in certain ways to see if the hernia becomes visible.  Imaging tests, such as:  X-rays.  Ultrasound.  CT scan.  How is this treated?  A hernia that is small and painless may not need to be treated. A hernia that is large or painful may be treated with surgery. Inguinal hernias may be treated with surgery to prevent incarceration or strangulation. Strangulated hernias are always treated with surgery because the strangulation causes a lack of blood supply to the trapped organ or tissue.    Surgery to treat a hernia involves pushing the bulge back into place and repairing the weak area of the muscle or abdominal wall.    Follow these instructions at home:  Activity    Avoid straining.  Do not lift anything that is heavier than 10 lb (4.5 kg), or the limit that you are told, until your health care provider says that it is safe.  When lifting heavy objects, lift with your leg muscles, not your back muscles.  Preventing constipation    Take actions to prevent constipation. Constipation leads to straining with bowel movements, which can make a hernia worse or cause a hernia repair to break down. Your health care provider may recommend that you take these actions to prevent or treat constipation:  Drink enough fluid to keep your urine pale yellow.  Take over-the-counter or prescription medicines.  Eat foods that are high in fiber, such as beans, whole grains, and fresh fruits and vegetables.  Limit foods that are high in fat and processed sugars, such as fried or sweet foods.  General instructions    When coughing, try to cough gently.  You may try to push the hernia back in place by very gently pressing on it while lying down. Do not try to force the bulge back in if it will not push in easily.  If you are overweight, work with your health care provider to lose weight safely.  Do not use any products that contain nicotine or tobacco. These products include cigarettes, chewing tobacco, and vaping devices, such as e-cigarettes. If you need help quitting, ask your health care provider.  If you are scheduled for hernia repair, watch your hernia for any changes in shape, size, or color. Tell your health care provider about any changes or new symptoms.  Take over-the-counter and prescription medicines only as told by your health care provider.  Keep all follow-up visits. This is important.  Contact a health care provider if:  You develop new pain, swelling, or redness around your hernia.  You have signs of constipation, such as:  Fewer bowel movements in a week than normal.  Difficulty having a bowel movement.  Stools that are dry, hard, or larger than normal.  Get help right away if:  You have a fever or chills.  You have abdominal pain that gets worse.  You feel nauseous or you vomit.  You cannot push the hernia back in place by very gently pressing on it while lying down. Do not try to force the bulge back in if it will not go in easily.  The hernia:  Changes in shape, size, or color.  Feels hard or tender.  These symptoms may represent a serious problem that is an emergency. Do not wait to see if the symptoms will go away. Get medical help right away. Call your local emergency services (911 in the [place] Do not drive yourself to the hospital.    Summary  A hernia is the bulging of an organ or tissue through a weak spot in the muscles of the abdomen.  The main symptom is a skin-colored bulge in the hernia area. However, a bulge may not always be present. It may grow bigger or more visible when you cough or strain (such as when having a bowel movement).  A hernia that is small and painless may not need to be treated. A hernia that is large or painful may be treated with surgery.  Surgery to treat a hernia involves pushing the bulge back into place and repairing the weak part of the abdomen.

## 2024-02-13 NOTE — ED PROVIDER NOTE - NS ED ROS FT
CONSTITUTIONAL: Denies fever and chills    HEENT: +cough    RESPIRATORY: Denies SOB     CARDIOVASCULAR: Denies chest pain.    GASTROINTESTINAL: Denies abdominal pain, nausea, vomiting and diarrhea.

## 2024-02-13 NOTE — ED PROVIDER NOTE - ATTENDING APP SHARED VISIT CONTRIBUTION OF CARE
68 M undomiciled IVDU now with bodyaches, malaise, cough, congestion.  No fever, cp or sob.  VSS.  Pt looked well, clear rhinorrhea on exam.  Lung clear.  Exam inguinal hernia, nontender, reducible.  No vomiting or obstipation.   Likely viral.  Do not suspect pna or sepsis.  Pt fed, RVP sent, will refer outpt clinic.

## 2024-02-13 NOTE — ED PROVIDER NOTE - OBJECTIVE STATEMENT
69 y/o male, undomiciled, w/ hx heroin abuse, ?brain tumor s/p resection as 12 y/o with residual R sided weakness p/w body aches and cough productive of yellow sputum x 2 days.  Reports having a large inguinal hernia x 2 mo, unchanged today.  Denies f/c, sore throat, cp, sob, abd pain, n/v/d, despite triage note, dysuria, hematuria, penile or scrotal pain.  Denies prior abd surgeries.

## 2024-02-13 NOTE — ED PROVIDER NOTE - CARE PROVIDER_API CALL
Marcin Amato  Colon/Rectal Surgery  [facility]  [place] [address]  Phone: [phone]  Fax: [phone]  Follow Up Time:

## 2024-02-13 NOTE — ED PROVIDER NOTE - CLINICAL SUMMARY MEDICAL DECISION MAKING FREE TEXT BOX
69 y/o male, undomiciled, w/ hx heroin abuse, ?brain tumor s/p resection as 10 y/o with residual R sided weakness p/w body aches and cough productive of yellow sputum x 2 days.  Reports having a large inguinal hernia x 2 mo, unchanged today.  Denies f/c, sore throat, cp, sob, abd pain, n/v/d, despite triage note, dysuria, hematuria, penile or scrotal pain.  Denies prior abd surgeries.  VSS   Exam notable for dried yellow mucus to b/l nares  Also with large nontender reducible hernia to L scrotum  Suspect likely viral upper resp infx  Flu/covid swab sent  Pt requesting food and dc  Will DC with supportive care

## 2024-03-09 NOTE — ED ADULT NURSE NOTE - PRIMARY CARE PROVIDER
If dosing can be verified with [facility] records, and patient's symptoms have improved and she has no active SI/HI, I can send the zoloft refill.    unknown

## 2024-05-13 ENCOUNTER — EMERGENCY (EMERGENCY)
Facility: HOSPITAL | Age: 69
LOS: 1 days | Discharge: LEFT BEFORE DISCHARGE | End: 2024-05-13
Admitting: EMERGENCY MEDICINE
Payer: MEDICAID

## 2024-05-13 VITALS
SYSTOLIC BLOOD PRESSURE: 114 MMHG | DIASTOLIC BLOOD PRESSURE: 60 MMHG | OXYGEN SATURATION: 97 % | WEIGHT: 145.06 LBS | HEIGHT: 71 IN | TEMPERATURE: 98 F | HEART RATE: 76 BPM | RESPIRATION RATE: 17 BRPM

## 2024-05-13 DIAGNOSIS — Z87.898 PERSONAL HISTORY OF OTHER SPECIFIED CONDITIONS: Chronic | ICD-10-CM

## 2024-05-13 PROCEDURE — L9991: CPT

## 2024-05-13 NOTE — ED ADULT TRIAGE NOTE - CHIEF COMPLAINT QUOTE
R foot pain atraumatic 10 days ago. ambulatory in triage. Arrives with old and other OSH bracelets on.

## 2024-05-15 ENCOUNTER — EMERGENCY (EMERGENCY)
Facility: HOSPITAL | Age: 69
LOS: 1 days | Discharge: HOME | End: 2024-05-15
Attending: EMERGENCY MEDICINE | Admitting: EMERGENCY MEDICINE
Payer: MEDICARE

## 2024-05-15 ENCOUNTER — EMERGENCY (EMERGENCY)
Facility: HOSPITAL | Age: 69
LOS: 1 days | Discharge: HOME | End: 2024-05-15
Attending: STUDENT IN AN ORGANIZED HEALTH CARE EDUCATION/TRAINING PROGRAM | Admitting: STUDENT IN AN ORGANIZED HEALTH CARE EDUCATION/TRAINING PROGRAM
Payer: MEDICARE

## 2024-05-15 VITALS
TEMPERATURE: 98 F | SYSTOLIC BLOOD PRESSURE: 119 MMHG | DIASTOLIC BLOOD PRESSURE: 81 MMHG | OXYGEN SATURATION: 97 % | RESPIRATION RATE: 20 BRPM | HEART RATE: 79 BPM | WEIGHT: 145.06 LBS | HEIGHT: 71 IN

## 2024-05-15 VITALS
WEIGHT: 145.06 LBS | RESPIRATION RATE: 18 BRPM | HEIGHT: 71 IN | OXYGEN SATURATION: 95 % | TEMPERATURE: 98 F | SYSTOLIC BLOOD PRESSURE: 109 MMHG | HEART RATE: 75 BPM | DIASTOLIC BLOOD PRESSURE: 71 MMHG

## 2024-05-15 DIAGNOSIS — Z87.898 PERSONAL HISTORY OF OTHER SPECIFIED CONDITIONS: Chronic | ICD-10-CM

## 2024-05-15 DIAGNOSIS — K40.90 UNILATERAL INGUINAL HERNIA, WITHOUT OBSTRUCTION OR GANGRENE, NOT SPECIFIED AS RECURRENT: ICD-10-CM

## 2024-05-15 DIAGNOSIS — R10.9 UNSPECIFIED ABDOMINAL PAIN: ICD-10-CM

## 2024-05-15 DIAGNOSIS — Z59.00 HOMELESSNESS UNSPECIFIED: ICD-10-CM

## 2024-05-15 DIAGNOSIS — F17.200 NICOTINE DEPENDENCE, UNSPECIFIED, UNCOMPLICATED: ICD-10-CM

## 2024-05-15 LAB — GLUCOSE BLDC GLUCOMTR-MCNC: 92 MG/DL — SIGNIFICANT CHANGE UP (ref 70–99)

## 2024-05-15 PROCEDURE — 99284 EMERGENCY DEPT VISIT MOD MDM: CPT

## 2024-05-15 PROCEDURE — 99282 EMERGENCY DEPT VISIT SF MDM: CPT

## 2024-05-15 PROCEDURE — 99283 EMERGENCY DEPT VISIT LOW MDM: CPT

## 2024-05-15 PROCEDURE — 82962 GLUCOSE BLOOD TEST: CPT

## 2024-05-15 RX ORDER — DOCUSATE SODIUM 100 MG
1 CAPSULE ORAL
Qty: 14 | Refills: 0
Start: 2024-05-15 | End: 2024-05-28

## 2024-05-15 RX ORDER — IBUPROFEN 200 MG
600 TABLET ORAL ONCE
Refills: 0 | Status: COMPLETED | OUTPATIENT
Start: 2024-05-15 | End: 2024-05-15

## 2024-05-15 RX ORDER — ACETAMINOPHEN 500 MG
1000 TABLET ORAL ONCE
Refills: 0 | Status: COMPLETED | OUTPATIENT
Start: 2024-05-15 | End: 2024-05-15

## 2024-05-15 RX ORDER — ACETAMINOPHEN 500 MG
2 TABLET ORAL
Qty: 40 | Refills: 0
Start: 2024-05-15

## 2024-05-15 RX ORDER — ACETAMINOPHEN 500 MG
650 TABLET ORAL ONCE
Refills: 0 | Status: COMPLETED | OUTPATIENT
Start: 2024-05-15 | End: 2024-05-15

## 2024-05-15 RX ADMIN — Medication 650 MILLIGRAM(S): at 15:53

## 2024-05-15 RX ADMIN — Medication 600 MILLIGRAM(S): at 23:49

## 2024-05-15 RX ADMIN — Medication 1000 MILLIGRAM(S): at 23:49

## 2024-05-15 SDOH — ECONOMIC STABILITY - HOUSING INSECURITY: HOMELESSNESS UNSPECIFIED: Z59.00

## 2024-05-15 NOTE — ED ADULT TRIAGE NOTE - CHIEF COMPLAINT QUOTE
pt BIBA for abd pain x 1 year. Pt states he has a hernia. Pt noted to have bracelet from different hospital on. States he was at another hospital yesterday for foot pain.

## 2024-05-15 NOTE — ED ADULT TRIAGE NOTE - CHIEF COMPLAINT QUOTE
as per patient, "i'm busy, busy, busy and dizzy, dizzy, dizzy and I took crack, crack, crack" EMS found patient sitting on the floor mumbling to self, pt denies any pain and reports having used "crack after I left"

## 2024-05-15 NOTE — ED PROVIDER NOTE - PATIENT PORTAL LINK FT
You can access the FollowMyHealth Patient Portal offered by [facility] by registering at the following website: [email or web address]. By joining [employer]’s FollowMyHealth portal, you will also be able to view your health information using other applications (apps) compatible with our system.

## 2024-05-15 NOTE — ED PROVIDER NOTE - CARE PROVIDER_API CALL
Cristi Larsen  Colon/Rectal Surgery  [address], [address]  [place] [address]  Phone: [phone]  Fax: [phone]  Follow Up Time: 4-6 Days

## 2024-05-15 NOTE — ED ADULT NURSE NOTE - NSFALLUNIVINTERV_ED_ALL_ED
Bed/Stretcher in lowest position, wheels locked, appropriate side rails in place/Call bell, personal items and telephone in reach/Instruct patient to call for assistance before getting out of bed/chair/stretcher/Non-slip footwear applied when patient is off stretcher/[place] to call system/Physically safe environment - no spills, clutter or unnecessary equipment/Purposeful proactive rounding/Room/bathroom lighting operational, light cord in reach

## 2024-05-15 NOTE — ED PROVIDER NOTE - PRINCIPAL DIAGNOSIS
What Type Of Note Output Would You Prefer (Optional)?: Standard Output How Severe Is Your Rash?: mild Is This A New Presentation, Or A Follow-Up?: Rash Additional History: Pt stated his been using Clobetasol on his face for a year. Inguinal hernia

## 2024-05-15 NOTE — ED PROVIDER NOTE - OBJECTIVE STATEMENT
68 M undomiciled, w/ hx heroin abuse, ?brain tumor s/p resection as 10 y/o with residual R sided weakness, L inguinal hernia p/w abd pain x 1 yr. States he also smoked crack. Denies dizziness or lightheadedness to me. No cp or sob. No fever, chills, nausea or vomiting. No urinary complaints. Normal BM. No SI or HI. ROS as above.

## 2024-05-15 NOTE — ED ADULT TRIAGE NOTE - TEMPERATURE IN FAHRENHEIT (DEGREES F)
"I have evaluated and performed a medical screening assessment on this patient while awaiting a thorough evaluation and treatment. All of the emergency department beds are occupied at this time. When appropriate, laboratory studies will be ordered from triage. The patient has been advised of this process and care plan. Patient complains of "dislocation of the right shoulder". He has tenderness and decreased ROM. Neurovascularly intact.    Orders pending: Right shoulder xray      " 97.5

## 2024-05-15 NOTE — ED PROVIDER NOTE - PHYSICAL EXAMINATION
Vitals reviewed  Gen: comfortable appearing, nad, speaking in full sentences  Skin: wwp  HEENT: ncat, eomi, airway patent   CV: rrr, no audible m/r/g  Resp: symmetrical expansion, ctab, no w/r/r  Abd: nondistended, soft, nontender, reducible L inguinal hernia, no overlying skin changes or ttp, no r/g  Ext: FROM throughout, no peripheral edema, no muscle or joint ttp, distal pulses 2+  Neuro: alert/oriented, no focal deficits, steady gait w/ cane

## 2024-05-15 NOTE — ED PROVIDER NOTE - CLINICAL SUMMARY MEDICAL DECISION MAKING FREE TEXT BOX
69 yo with inguinal hernia, reducible, soft and nontender. will treat pain, refer to general surgery for elective repair. no concern for strangulation or incarceration. endorsed dizziness at triage, denies any complaints here. farhana storey. salma bingham.

## 2024-05-15 NOTE — ED ADULT NURSE NOTE - OBJECTIVE STATEMENT
Patient is a 68yoM bibems for ams. Patient is awake and alert, spont breathing on RA. Patient is resting calmly on the stretcher. Reports that he used IN heroin tonight, denies all other drug/alcohol use. Patient able to participate with assessment, denies acute pain. Changed into yellow gown, no obvious signs of trauma/injury noted.

## 2024-05-15 NOTE — ED PROVIDER NOTE - NSFOLLOWUPINSTRUCTIONS_ED_ALL_ED_FT
Please take tylenol or motrin as needed. Please follow up with the general surgeon. Return for worsening symptoms, worsening pain, swelling, inability to reduce hernia, lack of bowel movements.     I hope you feel better soon!    Sincerely,  Puma Valente MD

## 2024-05-15 NOTE — ED PROVIDER NOTE - NS ED ATTENDING STATEMENT MOD
I have seen and examined this patient and fully participated in the care of this patient as the teaching attending.  The service was shared with the ANJELICA.  I reviewed and verified the documentation.

## 2024-05-15 NOTE — ED PROVIDER NOTE - NS ED MD DISPO DISCHARGE
Home
Physical Exam  GEN: Awake, alert, non-toxic appearing, NAD  EYES: full EOMI,  ENT: External inspection normal, normal voice,   HEAD: atraumatic  NECK: FROM neck, supple, no meningismus,   CV: rrr, no edema  RESP: cta bl, no tachypnea, no hypoxia, no resp distress,  GI: +BS, Soft, tender rlq, +rovsing, +obturator/psoas, no guarding/rebound,   : no cvat  MSK: FROM all 4 extremities,   SKIN: no abd wall rash

## 2024-05-15 NOTE — ED PROVIDER NOTE - PATIENT PORTAL LINK FT
You can access the FollowMyHealth Patient Portal offered by [facility] by registering at the following website: [email or web address]. By joining [employer]’s FollowMyHealth portal, you will also be able to view your health information using other applications (apps) compatible with our system.

## 2024-05-15 NOTE — ED PROVIDER NOTE - NSFOLLOWUPINSTRUCTIONS_ED_ALL_ED_FT
Follow up with your primary care doctor or clinics listed below if you do not have a doctor,    [address] [address]  To make an appointment, call [phone]    [facility]  Address: [address] [place] [address]  Appointment Center: [phone] ([phone])     [facility] is a good referral line for crisis and substance abuse help.   has drop in programs all over the city.    Return to the ER for Emergencies.  Return immediately for any new or worsening symptoms or any new concerns    Inguinal Hernia, Adult    An inguinal hernia develops when fat or the intestines push through a weak spot in a muscle where the leg meets the lower abdomen (groin). This creates a bulge. This kind of hernia could also be:  In the scrotum, if you are male.  In folds of skin around the vagina, if you are female.  There are three types of inguinal hernias:  Hernias that can be pushed back into the abdomen (are reducible). This type rarely causes pain.  Hernias that are not reducible (are incarcerated).  Hernias that are not reducible and lose their blood supply (are strangulated). This type of hernia requires emergency surgery.  What are the causes?  This condition is caused by having a weak spot in the muscles or tissues in your groin. This develops over time. The hernia may poke through the weak spot when you suddenly strain your lower abdominal muscles, such as when you:  Lift a heavy object.  Strain to have a bowel movement. Constipation can lead to straining.  Cough.  What increases the risk?  This condition is more likely to develop in:  Males.  Pregnant females.  People who:  Are overweight.  Work in jobs that require long periods of standing or heavy lifting.  Have had an inguinal hernia before.  Smoke or have lung disease. These factors can lead to long-term (chronic) coughing.  What are the signs or symptoms?  Symptoms may depend on the size of the hernia. Often, a small inguinal hernia has no symptoms. Symptoms of a larger hernia may include:  A bulge in the groin area. This is easier to see when standing. It might not be visible when lying down.  Pain or burning in the groin. This may get worse when lifting, straining, or coughing.  A dull ache or a feeling of pressure in the groin.  An unusual bulge in the scrotum, in males.  Symptoms of a strangulated inguinal hernia may include:  A bulge in your groin that is very painful and tender to the touch.  A bulge that turns red or purple.  Fever, nausea, and vomiting.  Inability to have a bowel movement or to pass gas.  How is this diagnosed?  This condition is diagnosed based on your symptoms, your medical history, and a physical exam. Your health care provider may feel your groin area and ask you to cough.    How is this treated?  Treatment depends on the size of your hernia and whether you have symptoms. If you do not have symptoms, your health care provider may have you watch your hernia carefully and have you come in for follow-up visits. If your hernia is large or if you have symptoms, you may need surgery to repair the hernia.    Follow these instructions at home:  Lifestyle    Avoid lifting heavy objects.  Avoid standing for long periods of time.  Do not use any products that contain nicotine or tobacco. These products include cigarettes, chewing tobacco, and vaping devices, such as e-cigarettes. If you need help quitting, ask your health care provider.  Maintain a healthy weight.  Preventing constipation    You may need to take these actions to prevent or treat constipation:  Drink enough fluid to keep your urine pale yellow.  Take over-the-counter or prescription medicines.  Eat foods that are high in fiber, such as beans, whole grains, and fresh fruits and vegetables.  Limit foods that are high in fat and processed sugars, such as fried or sweet foods.  General instructions    You may try to push the hernia back in place by very gently pressing on it while lying down. Do not try to force the bulge back in if it will not push in easily.  Watch your hernia for any changes in shape, size, or color. Get help right away if you notice any changes.  Take over-the-counter and prescription medicines only as told by your health care provider.  Keep all follow-up visits. This is important.  Contact a health care provider if:  You have a fever or chills.  You develop new symptoms.  Your symptoms get worse.  Get help right away if:  You have pain in your groin that suddenly gets worse.  You have a bulge in your groin that:  Suddenly gets bigger and does not get smaller.  Becomes red or purple or painful to the touch.  You are a man and you have a sudden pain in your scrotum, or the size of your scrotum suddenly changes.  You cannot push the hernia back in place by very gently pressing on it when you are lying down.  You have nausea or vomiting that does not go away.  You have a fast heartbeat.  You cannot have a bowel movement or pass gas.  These symptoms may represent a serious problem that is an emergency. Do not wait to see if the symptoms will go away. Get medical help right away. Call your local emergency services (911 in the [place]    Summary  An inguinal hernia develops when fat or the intestines push through a weak spot in a muscle where your leg meets your lower abdomen (groin).  This condition is caused by having a weak spot in muscles or tissues in your groin.  Symptoms may depend on the size of the hernia, and they may include pain or swelling in your groin. A small inguinal hernia often has no symptoms.  Treatment may not be needed if you do not have symptoms. If you have symptoms or a large hernia, you may need surgery to repair the hernia.  Avoid lifting heavy objects. Also, avoid standing for long periods of time.  This information is not intended to replace advice given to you by your health care provider. Make sure you discuss any questions you have with your health care provider.

## 2024-05-15 NOTE — ED PROVIDER NOTE - NS ED ROS FT
Constitutional: No fever or chills  Eyes: No discharge or drainage  Ears, Nose, Mouth, Throat: No nasal discharge, no sore throat  Cardiovascular: No chest pain, no palpitations  Respiratory: No shortness of breath, no cough  Gastrointestinal: No nausea or vomiting, + abdominal pain, no diarrhea or constipation  Musculoskeletal: No joint pain, no swelling  Skin: No rashes or lesions  Neurological: No numbness, weakness, tingling, no headache  Psychiatric: No depression

## 2024-05-15 NOTE — ED PROVIDER NOTE - PHYSICAL EXAMINATION
general: Well appearing, in no acute distress  HEENT: Normocephalic, atraumatic, extraocular movements intact  CV: Regular rate  Pulm: No respiratory distress, no tachypnea  Abd: Flat, soft, ntnd, L inguinal hernia soft, reducible, no ttp, no overlying skin changes  Ext: warm and well perfused  Skin: No gross rashes or lesions  Neuro: Alert and oriented, moving all extremities with

## 2024-05-15 NOTE — ED PROVIDER NOTE - CLINICAL SUMMARY MEDICAL DECISION MAKING FREE TEXT BOX
68 M undomiciled, w/ hx heroin abuse, ?brain tumor s/p resection as 10 y/o with residual R sided weakness, L inguinal hernia p/w abd pain x 1 yr; intermittent pain 2/2 known L inguinal hernia.  pt has no obstructive sxs.  on exam pt comfortable appearing, abd soft/nt, reducible L inguinal hernia.  pt ate sandwiches in ED, given rx for tylenol and colace and instructed to f/u outpt..  given information for [facility] due to insurance.  discussed strict return parameters

## 2024-05-17 DIAGNOSIS — M79.671 PAIN IN RIGHT FOOT: ICD-10-CM

## 2024-05-17 DIAGNOSIS — Z53.21 PROCEDURE AND TREATMENT NOT CARRIED OUT DUE TO PATIENT LEAVING PRIOR TO BEING SEEN BY HEALTH CARE PROVIDER: ICD-10-CM

## 2024-05-24 NOTE — ED ADULT NURSE NOTE - CHIEF COMPLAINT QUOTE
Patient mobility status  with no difficulty. Provider aware     I have reviewed discharge instructions with the patient.  The patient verbalized understanding.    Patient left ED via Discharge Method: ambulatory to Home with  self .    Opportunity for questions and clarification provided.     Patient given 2 scripts.            rt leg/foot pain for 4 days. Pt denies trauma/no injury. Thalidomide Counseling: I discussed with the patient the risks of thalidomide including but not limited to birth defects, anxiety, weakness, chest pain, dizziness, cough and severe allergy. 5-Fu Counseling: 5-Fluorouracil Counseling:  I discussed with the patient the risks of 5-fluorouracil including but not limited to erythema, scaling, itching, weeping, crusting, and pain. Doxycycline Counseling:  Patient counseled regarding possible photosensitivity and increased risk for sunburn.  Patient instructed to avoid sunlight, if possible.  When exposed to sunlight, patients should wear protective clothing, sunglasses, and sunscreen.  The patient was instructed to call the office immediately if the following severe adverse effects occur:  hearing changes, easy bruising/bleeding, severe headache, or vision changes.  The patient verbalized understanding of the proper use and possible adverse effects of doxycycline.  All of the patient's questions and concerns were addressed. Elidel Counseling: Patient may experience a mild burning sensation during topical application. Elidel is not approved in children less than 2 years of age. There have been case reports of hematologic and skin malignancies in patients using topical calcineurin inhibitors although causality is questionable. Acitretin Counseling:  I discussed with the patient the risks of acitretin including but not limited to hair loss, dry lips/skin/eyes, liver damage, hyperlipidemia, depression/suicidal ideation, photosensitivity.  Serious rare side effects can include but are not limited to pancreatitis, pseudotumor cerebri, bony changes, clot formation/stroke/heart attack.  Patient understands that alcohol is contraindicated since it can result in liver toxicity and significantly prolong the elimination of the drug by many years. Rifampin Pregnancy And Lactation Text: This medication is Pregnancy Category C and it isn't know if it is safe during pregnancy. It is also excreted in breast milk and should not be used if you are breast feeding. Azathioprine Pregnancy And Lactation Text: This medication is Pregnancy Category D and isn't considered safe during pregnancy. It is unknown if this medication is excreted in breast milk. Minoxidil Pregnancy And Lactation Text: This medication has not been assigned a Pregnancy Risk Category but animal studies failed to show danger with the topical medication. It is unknown if the medication is excreted in breast milk. Humira Counseling:  I discussed with the patient the risks of adalimumab including but not limited to myelosuppression, immunosuppression, autoimmune hepatitis, demyelinating diseases, lymphoma, and serious infections.  The patient understands that monitoring is required including a PPD at baseline and must alert us or the primary physician if symptoms of infection or other concerning signs are noted. Topical Sulfur Applications Counseling: Topical Sulfur Counseling: Patient counseled that this medication may cause skin irritation or allergic reactions.  In the event of skin irritation, the patient was advised to reduce the amount of the drug applied or use it less frequently.   The patient verbalized understanding of the proper use and possible adverse effects of topical sulfur application.  All of the patient's questions and concerns were addressed. Picato Pregnancy And Lactation Text: This medication is Pregnancy Category C. It is unknown if this medication is excreted in breast milk. Cimzia Pregnancy And Lactation Text: This medication crosses the placenta but can be considered safe in certain situations. Cimzia may be excreted in breast milk. Quinolones Counseling:  I discussed with the patient the risks of fluoroquinolones including but not limited to GI upset, allergic reaction, drug rash, diarrhea, dizziness, photosensitivity, yeast infections, liver function test abnormalities, tendonitis/tendon rupture. Topical Clindamycin Counseling: Patient counseled that this medication may cause skin irritation or allergic reactions.  In the event of skin irritation, the patient was advised to reduce the amount of the drug applied or use it less frequently.   The patient verbalized understanding of the proper use and possible adverse effects of clindamycin.  All of the patient's questions and concerns were addressed. Drysol Counseling:  I discussed with the patient the risks of drysol/aluminum chloride including but not limited to skin rash, itching, irritation, burning. Erythromycin Counseling:  I discussed with the patient the risks of erythromycin including but not limited to GI upset, allergic reaction, drug rash, diarrhea, increase in liver enzymes, and yeast infections. Benzoyl Peroxide Counseling: Patient counseled that medicine may cause skin irritation and bleach clothing.  In the event of skin irritation, the patient was advised to reduce the amount of the drug applied or use it less frequently.   The patient verbalized understanding of the proper use and possible adverse effects of benzoyl peroxide.  All of the patient's questions and concerns were addressed. Methotrexate Pregnancy And Lactation Text: This medication is Pregnancy Category X and is known to cause fetal harm. This medication is excreted in breast milk. Topical Sulfur Applications Pregnancy And Lactation Text: This medication is Pregnancy Category C and has an unknown safety profile during pregnancy. It is unknown if this topical medication is excreted in breast milk. Albendazole Pregnancy And Lactation Text: This medication is Pregnancy Category C and it isn't known if it is safe during pregnancy. It is also excreted in breast milk. Griseofulvin Counseling:  I discussed with the patient the risks of griseofulvin including but not limited to photosensitivity, cytopenia, liver damage, nausea/vomiting and severe allergy.  The patient understands that this medication is best absorbed when taken with a fatty meal (e.g., ice cream or french fries). Isotretinoin Pregnancy And Lactation Text: This medication is Pregnancy Category X and is considered extremely dangerous during pregnancy. It is unknown if it is excreted in breast milk. Xolair Counseling:  Patient informed of potential adverse effects including but not limited to fever, muscle aches, rash and allergic reactions.  The patient verbalized understanding of the proper use and possible adverse effects of Xolair.  All of the patient's questions and concerns were addressed. Clindamycin Counseling: I counseled the patient regarding use of clindamycin as an antibiotic for prophylactic and/or therapeutic purposes. Clindamycin is active against numerous classes of bacteria, including skin bacteria. Side effects may include nausea, diarrhea, gastrointestinal upset, rash, hives, yeast infections, and in rare cases, colitis. Methotrexate Counseling:  Patient counseled regarding adverse effects of methotrexate including but not limited to nausea, vomiting, abnormalities in liver function tests. Patients may develop mouth sores, rash, diarrhea, and abnormalities in blood counts. The patient understands that monitoring is required including LFT's and blood counts.  There is a rare possibility of scarring of the liver and lung problems that can occur when taking methotrexate. Persistent nausea, loss of appetite, pale stools, dark urine, cough, and shortness of breath should be reported immediately. Patient advised to discontinue methotrexate treatment at least three months before attempting to become pregnant.  I discussed the need for folate supplements while taking methotrexate.  These supplements can decrease side effects during methotrexate treatment. The patient verbalized understanding of the proper use and possible adverse effects of methotrexate.  All of the patient's questions and concerns were addressed. Isotretinoin Counseling: Patient should get monthly blood tests, not donate blood, not drive at night if vision affected, not share medication, and not undergo elective surgery for 6 months after tx completed. Side effects reviewed, pt to contact office should one occur. Quinolones Pregnancy And Lactation Text: This medication is Pregnancy Category C and it isn't know if it is safe during pregnancy. It is also excreted in breast milk. Taltz Counseling: I discussed with the patient the risks of ixekizumab including but not limited to immunosuppression, serious infections, worsening of inflammatory bowel disease and drug reactions.  The patient understands that monitoring is required including a PPD at baseline and must alert us or the primary physician if symptoms of infection or other concerning signs are noted. Solaraze Counseling:  I discussed with the patient the risks of Solaraze including but not limited to erythema, scaling, itching, weeping, crusting, and pain. Tazorac Counseling:  Patient advised that medication is irritating and drying.  Patient may need to apply sparingly and wash off after an hour before eventually leaving it on overnight.  The patient verbalized understanding of the proper use and possible adverse effects of tazorac.  All of the patient's questions and concerns were addressed. Cyclosporine Counseling:  I discussed with the patient the risks of cyclosporine including but not limited to hypertension, gingival hyperplasia,myelosuppression, immunosuppression, liver damage, kidney damage, neurotoxicity, lymphoma, and serious infections. The patient understands that monitoring is required including baseline blood pressure, CBC, CMP, lipid panel and uric acid, and then 1-2 times monthly CMP and blood pressure. Bexarotene Counseling:  I discussed with the patient the risks of bexarotene including but not limited to hair loss, dry lips/skin/eyes, liver abnormalities, hyperlipidemia, pancreatitis, depression/suicidal ideation, photosensitivity, drug rash/allergic reactions, hypothyroidism, anemia, leukopenia, infection, cataracts, and teratogenicity.  Patient understands that they will need regular blood tests to check lipid profile, liver function tests, white blood cell count, thyroid function tests and pregnancy test if applicable. Cimetidine Counseling:  I discussed with the patient the risks of Cimetidine including but not limited to gynecomastia, headache, diarrhea, nausea, drowsiness, arrhythmias, pancreatitis, skin rashes, psychosis, bone marrow suppression and kidney toxicity. Simponi Pregnancy And Lactation Text: The risk during pregnancy and breastfeeding is uncertain with this medication. Cyclophosphamide Counseling:  I discussed with the patient the risks of cyclophosphamide including but not limited to hair loss, hormonal abnormalities, decreased fertility, abdominal pain, diarrhea, nausea and vomiting, bone marrow suppression and infection. The patient understands that monitoring is required while taking this medication. Cosentyx Pregnancy And Lactation Text: This medication is Pregnancy Category B and is considered safe during pregnancy. It is unknown if this medication is excreted in breast milk. Arava Counseling:  Patient counseled regarding adverse effects of Arava including but not limited to nausea, vomiting, abnormalities in liver function tests. Patients may develop mouth sores, rash, diarrhea, and abnormalities in blood counts. The patient understands that monitoring is required including LFTs and blood counts.  There is a rare possibility of scarring of the liver and lung problems that can occur when taking methotrexate. Persistent nausea, loss of appetite, pale stools, dark urine, cough, and shortness of breath should be reported immediately. Patient advised to discontinue Arava treatment and consult with a physician prior to attempting conception. The patient will have to undergo a treatment to eliminate Arava from the body prior to conception. Ivermectin Counseling:  Patient instructed to take medication on an empty stomach with a full glass of water.  Patient informed of potential adverse effects including but not limited to nausea, diarrhea, dizziness, itching, and swelling of the extremities or lymph nodes.  The patient verbalized understanding of the proper use and possible adverse effects of ivermectin.  All of the patient's questions and concerns were addressed. Cephalexin Counseling: I counseled the patient regarding use of cephalexin as an antibiotic for prophylactic and/or therapeutic purposes. Cephalexin (commonly prescribed under brand name Keflex) is a cephalosporin antibiotic which is active against numerous classes of bacteria, including most skin bacteria. Side effects may include nausea, diarrhea, gastrointestinal upset, rash, hives, yeast infections, and in rare cases, hepatitis, kidney disease, seizures, fever, confusion, neurologic symptoms, and others. Patients with severe allergies to penicillin medications are cautioned that there is about a 10% incidence of cross-reactivity with cephalosporins. When possible, patients with penicillin allergies should use alternatives to cephalosporins for antibiotic therapy. Clofazimine Pregnancy And Lactation Text: This medication is Pregnancy Category C and isn't considered safe during pregnancy. It is excreted in breast milk. Nsaids Counseling: NSAID Counseling: I discussed with the patient that NSAIDs should be taken with food. Prolonged use of NSAIDs can result in the development of stomach ulcers.  Patient advised to stop taking NSAIDs if abdominal pain occurs.  The patient verbalized understanding of the proper use and possible adverse effects of NSAIDs.  All of the patient's questions and concerns were addressed. Odomzo Pregnancy And Lactation Text: This medication is Pregnancy Category X and is absolutely contraindicated during pregnancy. It is unknown if it is excreted in breast milk. Clindamycin Pregnancy And Lactation Text: This medication can be used in pregnancy if certain situations. Clindamycin is also present in breast milk. Metronidazole Pregnancy And Lactation Text: This medication is Pregnancy Category B and considered safe during pregnancy.  It is also excreted in breast milk. Acitretin Pregnancy And Lactation Text: This medication is Pregnancy Category X and should not be given to women who are pregnant or may become pregnant in the future. This medication is excreted in breast milk. Erythromycin Pregnancy And Lactation Text: This medication is Pregnancy Category B and is considered safe during pregnancy. It is also excreted in breast milk. Griseofulvin Pregnancy And Lactation Text: This medication is Pregnancy Category X and is known to cause serious birth defects. It is unknown if this medication is excreted in breast milk but breast feeding should be avoided. SSKI Counseling:  I discussed with the patient the risks of SSKI including but not limited to thyroid abnormalities, metallic taste, GI upset, fever, headache, acne, arthralgias, paraesthesias, lymphadenopathy, easy bleeding, arrhythmias, and allergic reaction. Topical Clindamycin Pregnancy And Lactation Text: This medication is Pregnancy Category B and is considered safe during pregnancy. It is unknown if it is excreted in breast milk. Drysol Pregnancy And Lactation Text: This medication is considered safe during pregnancy and breast feeding. Azathioprine Counseling:  I discussed with the patient the risks of azathioprine including but not limited to myelosuppression, immunosuppression, hepatotoxicity, lymphoma, and infections.  The patient understands that monitoring is required including baseline LFTs, Creatinine, possible TPMP genotyping and weekly CBCs for the first month and then every 2 weeks thereafter.  The patient verbalized understanding of the proper use and possible adverse effects of azathioprine.  All of the patient's questions and concerns were addressed. Birth Control Pills Pregnancy And Lactation Text: This medication should be avoided if pregnant and for the first 30 days post-partum. Rituxan Counseling:  I discussed with the patient the risks of Rituxan infusions. Side effects can include infusion reactions, severe drug rashes including mucocutaneous reactions, reactivation of latent hepatitis and other infections and rarely progressive multifocal leukoencephalopathy.  All of the patient's questions and concerns were addressed. Ketoconazole Counseling:   Patient counseled regarding improving absorption with orange juice.  Adverse effects include but are not limited to breast enlargement, headache, diarrhea, nausea, upset stomach, liver function test abnormalities, taste disturbance, and stomach pain.  There is a rare possibility of liver failure that can occur when taking ketoconazole. The patient understands that monitoring of LFTs may be required, especially at baseline. The patient verbalized understanding of the proper use and possible adverse effects of ketoconazole.  All of the patient's questions and concerns were addressed. Minocycline Pregnancy And Lactation Text: This medication is Pregnancy Category D and not consider safe during pregnancy. It is also excreted in breast milk. Protopic Counseling: Patient may experience a mild burning sensation during topical application. Protopic is not approved in children less than 2 years of age. There have been case reports of hematologic and skin malignancies in patients using topical calcineurin inhibitors although causality is questionable. Use Enhanced Medication Counseling?: No Hydroxyzine Counseling: Patient advised that the medication is sedating and not to drive a car after taking this medication.  Patient informed of potential adverse effects including but not limited to dry mouth, urinary retention, and blurry vision.  The patient verbalized understanding of the proper use and possible adverse effects of hydroxyzine.  All of the patient's questions and concerns were addressed. Bactrim Counseling:  I discussed with the patient the risks of sulfa antibiotics including but not limited to GI upset, allergic reaction, drug rash, diarrhea, dizziness, photosensitivity, and yeast infections.  Rarely, more serious reactions can occur including but not limited to aplastic anemia, agranulocytosis, methemoglobinemia, blood dyscrasias, liver or kidney failure, lung infiltrates or desquamative/blistering drug rashes. Xelkeyonz Pregnancy And Lactation Text: This medication is Pregnancy Category D and is not considered safe during pregnancy.  The risk during breast feeding is also uncertain. Xolair Pregnancy And Lactation Text: This medication is Pregnancy Category B and is considered safe during pregnancy. This medication is excreted in breast milk. Xeljanz Counseling: I discussed with the patient the risks of Xeljanz therapy including increased risk of infection, liver issues, headache, diarrhea, or cold symptoms. Live vaccines should be avoided. They were instructed to call if they have any problems. Cyclophosphamide Pregnancy And Lactation Text: This medication is Pregnancy Category D and it isn't considered safe during pregnancy. This medication is excreted in breast milk. Glycopyrrolate Counseling:  I discussed with the patient the risks of glycopyrrolate including but not limited to skin rash, drowsiness, dry mouth, difficulty urinating, and blurred vision. Nsaids Pregnancy And Lactation Text: These medications are considered safe up to 30 weeks gestation. It is excreted in breast milk. Erivedge Counseling- I discussed with the patient the risks of Erivedge including but not limited to nausea, vomiting, diarrhea, constipation, weight loss, changes in the sense of taste, decreased appetite, muscle spasms, and hair loss.  The patient verbalized understanding of the proper use and possible adverse effects of Erivedge.  All of the patient's questions and concerns were addressed. Otezla Pregnancy And Lactation Text: This medication is Pregnancy Category C and it isn't known if it is safe during pregnancy. It is unknown if it is excreted in breast milk. Cellcept Counseling:  I discussed with the patient the risks of mycophenolate mofetil including but not limited to infection/immunosuppression, GI upset, hypokalemia, hypercholesterolemia, bone marrow suppression, lymphoproliferative disorders, malignancy, GI ulceration/bleed/perforation, colitis, interstitial lung disease, kidney failure, progressive multifocal leukoencephalopathy, and birth defects.  The patient understands that monitoring is required including a baseline creatinine and regular CBC testing. In addition, patient must alert us immediately if symptoms of infection or other concerning signs are noted. Tetracycline Counseling: Patient counseled regarding possible photosensitivity and increased risk for sunburn.  Patient instructed to avoid sunlight, if possible.  When exposed to sunlight, patients should wear protective clothing, sunglasses, and sunscreen.  The patient was instructed to call the office immediately if the following severe adverse effects occur:  hearing changes, easy bruising/bleeding, severe headache, or vision changes.  The patient verbalized understanding of the proper use and possible adverse effects of tetracycline.  All of the patient's questions and concerns were addressed. Patient understands to avoid pregnancy while on therapy due to potential birth defects. Oxybutynin Counseling:  I discussed with the patient the risks of oxybutynin including but not limited to skin rash, drowsiness, dry mouth, difficulty urinating, and blurred vision. High Dose Vitamin A Pregnancy And Lactation Text: High dose vitamin A therapy is contraindicated during pregnancy and breast feeding. 5-Fu Pregnancy And Lactation Text: This medication is Pregnancy Category X and contraindicated in pregnancy and in women who may become pregnant. It is unknown if this medication is excreted in breast milk. Colchicine Counseling:  Patient counseled regarding adverse effects including but not limited to stomach upset (nausea, vomiting, stomach pain, or diarrhea).  Patient instructed to limit alcohol consumption while taking this medication.  Colchicine may reduce blood counts especially with prolonged use.  The patient understands that monitoring of kidney function and blood counts may be required, especially at baseline. The patient verbalized understanding of the proper use and possible adverse effects of colchicine.  All of the patient's questions and concerns were addressed. Cyclosporine Pregnancy And Lactation Text: This medication is Pregnancy Category C and it isn't know if it is safe during pregnancy. This medication is excreted in breast milk. Doxepin Counseling:  Patient advised that the medication is sedating and not to drive a car after taking this medication. Patient informed of potential adverse effects including but not limited to dry mouth, urinary retention, and blurry vision.  The patient verbalized understanding of the proper use and possible adverse effects of doxepin.  All of the patient's questions and concerns were addressed. Infliximab Counseling:  I discussed with the patient the risks of infliximab including but not limited to myelosuppression, immunosuppression, autoimmune hepatitis, demyelinating diseases, lymphoma, and serious infections.  The patient understands that monitoring is required including a PPD at baseline and must alert us or the primary physician if symptoms of infection or other concerning signs are noted. Valtrex Counseling: I discussed with the patient the risks of valacyclovir including but not limited to kidney damage, nausea, vomiting and severe allergy.  The patient understands that if the infection seems to be worsening or is not improving, they are to call. Cimzia Counseling:  I discussed with the patient the risks of Cimzia including but not limited to immunosuppression, allergic reactions and infections.  The patient understands that monitoring is required including a PPD at baseline and must alert us or the primary physician if symptoms of infection or other concerning signs are noted. Minocycline Counseling: Patient advised regarding possible photosensitivity and discoloration of the teeth, skin, lips, tongue and gums.  Patient instructed to avoid sunlight, if possible.  When exposed to sunlight, patients should wear protective clothing, sunglasses, and sunscreen.  The patient was instructed to call the office immediately if the following severe adverse effects occur:  hearing changes, easy bruising/bleeding, severe headache, or vision changes.  The patient verbalized understanding of the proper use and possible adverse effects of minocycline.  All of the patient's questions and concerns were addressed. Doxycycline Pregnancy And Lactation Text: This medication is Pregnancy Category D and not consider safe during pregnancy. It is also excreted in breast milk but is considered safe for shorter treatment courses. Spironolactone Counseling: Patient advised regarding risks of diarrhea, abdominal pain, hyperkalemia, birth defects (for female patients), liver toxicity and renal toxicity. The patient may need blood work to monitor liver and kidney function and potassium levels while on therapy. The patient verbalized understanding of the proper use and possible adverse effects of spironolactone.  All of the patient's questions and concerns were addressed. Carac Counseling:  I discussed with the patient the risks of Carac including but not limited to erythema, scaling, itching, weeping, crusting, and pain. Terbinafine Pregnancy And Lactation Text: This medication is Pregnancy Category B and is considered safe during pregnancy. It is also excreted in breast milk and breast feeding isn't recommended. Cosentyx Counseling:  I discussed with the patient the risks of Cosentyx including but not limited to worsening of Crohn's disease, immunosuppression, allergic reactions and infections.  The patient understands that monitoring is required including a PPD at baseline and must alert us or the primary physician if symptoms of infection or other concerning signs are noted. Minoxidil Counseling: Minoxidil is a topical medication which can increase blood flow where it is applied. It is uncertain how this medication increases hair growth. Side effects are uncommon and include stinging and allergic reactions. Detail Level: Zone Hydroxychloroquine Pregnancy And Lactation Text: This medication has been shown to cause fetal harm but it isn't assigned a Pregnancy Risk Category. There are small amounts excreted in breast milk. Fluconazole Counseling:  Patient counseled regarding adverse effects of fluconazole including but not limited to headache, diarrhea, nausea, upset stomach, liver function test abnormalities, taste disturbance, and stomach pain.  There is a rare possibility of liver failure that can occur when taking fluconazole.  The patient understands that monitoring of LFTs and kidney function test may be required, especially at baseline. The patient verbalized understanding of the proper use and possible adverse effects of fluconazole.  All of the patient's questions and concerns were addressed. Gabapentin Counseling: I discussed with the patient the risks of gabapentin including but not limited to dizziness, somnolence, fatigue and ataxia. Dapsone Counseling: I discussed with the patient the risks of dapsone including but not limited to hemolytic anemia, agranulocytosis, rashes, methemoglobinemia, kidney failure, peripheral neuropathy, headaches, GI upset, and liver toxicity.  Patients who start dapsone require monitoring including baseline LFTs and weekly CBCs for the first month, then every month thereafter.  The patient verbalized understanding of the proper use and possible adverse effects of dapsone.  All of the patient's questions and concerns were addressed. Prednisone Counseling:  I discussed with the patient the risks of prolonged use of prednisone including but not limited to weight gain, insomnia, osteoporosis, mood changes, diabetes, susceptibility to infection, glaucoma and high blood pressure.  In cases where prednisone use is prolonged, patients should be monitored with blood pressure checks, serum glucose levels and an eye exam.  Additionally, the patient may need to be placed on GI prophylaxis, PCP prophylaxis, and calcium and vitamin D supplementation and/or a bisphosphonate.  The patient verbalized understanding of the proper use and the possible adverse effects of prednisone.  All of the patient's questions and concerns were addressed. Topical Retinoid counseling:  Patient advised to apply a pea-sized amount only at bedtime and wait 30 minutes after washing their face before applying.  If too drying, patient may add a non-comedogenic moisturizer. The patient verbalized understanding of the proper use and possible adverse effects of retinoids.  All of the patient's questions and concerns were addressed. High Dose Vitamin A Counseling: Side effects reviewed, pt to contact office should one occur. Birth Control Pills Counseling: Birth Control Pill Counseling: I discussed with the patient the potential side effects of OCPs including but not limited to increased risk of stroke, heart attack, thrombophlebitis, deep venous thrombosis, hepatic adenomas, breast changes, GI upset, headaches, and depression.  The patient verbalized understanding of the proper use and possible adverse effects of OCPs. All of the patient's questions and concerns were addressed. Dapsone Pregnancy And Lactation Text: This medication is Pregnancy Category C and is not considered safe during pregnancy or breast feeding. Dupixent Counseling: I discussed with the patient the risks of dupilumab including but not limited to eye infection and irritation, cold sores, injection site reactions, worsening of asthma, allergic reactions and increased risk of parasitic infection.  Live vaccines should be avoided while taking dupilumab. Dupilumab will also interact with certain medications such as warfarin and cyclosporine. The patient understands that monitoring is required and they must alert us or the primary physician if symptoms of infection or other concerning signs are noted. Glycopyrrolate Pregnancy And Lactation Text: This medication is Pregnancy Category B and is considered safe during pregnancy. It is unknown if it is excreted breast milk. Picato Counseling:  I discussed with the patient the risks of Picato including but not limited to erythema, scaling, itching, weeping, crusting, and pain. Albendazole Counseling:  I discussed with the patient the risks of albendazole including but not limited to cytopenia, kidney damage, nausea/vomiting and severe allergy.  The patient understands that this medication is being used in an off-label manner. Hydroxyzine Pregnancy And Lactation Text: This medication is not safe during pregnancy and should not be taken. It is also excreted in breast milk and breast feeding isn't recommended. Valtrex Pregnancy And Lactation Text: this medication is Pregnancy Category B and is considered safe during pregnancy. This medication is not directly found in breast milk but it's metabolite acyclovir is present. Enbrel Counseling:  I discussed with the patient the risks of etanercept including but not limited to myelosuppression, immunosuppression, autoimmune hepatitis, demyelinating diseases, lymphoma, and infections.  The patient understands that monitoring is required including a PPD at baseline and must alert us or the primary physician if symptoms of infection or other concerning signs are noted. Stelara Counseling:  I discussed with the patient the risks of ustekinumab including but not limited to immunosuppression, malignancy, posterior leukoencephalopathy syndrome, and serious infections.  The patient understands that monitoring is required including a PPD at baseline and must alert us or the primary physician if symptoms of infection or other concerning signs are noted. Dupixent Pregnancy And Lactation Text: This medication likely crosses the placenta but the risk for the fetus is uncertain. This medication is excreted in breast milk. Tremfya Counseling: I discussed with the patient the risks of guselkumab including but not limited to immunosuppression, serious infections, worsening of inflammatory bowel disease and drug reactions.  The patient understands that monitoring is required including a PPD at baseline and must alert us or the primary physician if symptoms of infection or other concerning signs are noted. Doxepin Pregnancy And Lactation Text: This medication is Pregnancy Category C and it isn't known if it is safe during pregnancy. It is also excreted in breast milk and breast feeding isn't recommended. Clofazimine Counseling:  I discussed with the patient the risks of clofazimine including but not limited to skin and eye pigmentation, liver damage, nausea/vomiting, gastrointestinal bleeding and allergy. Metronidazole Counseling:  I discussed with the patient the risks of metronidazole including but not limited to seizures, nausea/vomiting, a metallic taste in the mouth, nausea/vomiting and severe allergy. Odomzo Counseling- I discussed with the patient the risks of Odomzo including but not limited to nausea, vomiting, diarrhea, constipation, weight loss, changes in the sense of taste, decreased appetite, muscle spasms, and hair loss.  The patient verbalized understanding of the proper use and possible adverse effects of Odomzo.  All of the patient's questions and concerns were addressed. Cephalexin Pregnancy And Lactation Text: This medication is Pregnancy Category B and considered safe during pregnancy.  It is also excreted in breast milk but can be used safely for shorter doses. Solaraze Pregnancy And Lactation Text: This medication is Pregnancy Category B and is considered safe. There is some data to suggest avoiding during the third trimester. It is unknown if this medication is excreted in breast milk. Hydroxychloroquine Counseling:  I discussed with the patient that a baseline ophthalmologic exam is needed at the start of therapy and every year thereafter while on therapy. A CBC may also be warranted for monitoring.  The side effects of this medication were discussed with the patient, including but not limited to agranulocytosis, aplastic anemia, seizures, rashes, retinopathy, and liver toxicity. Patient instructed to call the office should any adverse effect occur.  The patient verbalized understanding of the proper use and possible adverse effects of Plaquenil.  All the patient's questions and concerns were addressed. Spironolactone Pregnancy And Lactation Text: This medication can cause feminization of the male fetus and should be avoided during pregnancy. The active metabolite is also found in breast milk. Bexarotene Pregnancy And Lactation Text: This medication is Pregnancy Category X and should not be given to women who are pregnant or may become pregnant. This medication should not be used if you are breast feeding. Eucrisa Counseling: Patient may experience a mild burning sensation during topical application. Eucrisa is not approved in children less than 2 years of age. Simponi Counseling:  I discussed with the patient the risks of golimumab including but not limited to myelosuppression, immunosuppression, autoimmune hepatitis, demyelinating diseases, lymphoma, and serious infections.  The patient understands that monitoring is required including a PPD at baseline and must alert us or the primary physician if symptoms of infection or other concerning signs are noted. Imiquimod Counseling:  I discussed with the patient the risks of imiquimod including but not limited to erythema, scaling, itching, weeping, crusting, and pain.  Patient understands that the inflammatory response to imiquimod is variable from person to person and was educated regarded proper titration schedule.  If flu-like symptoms develop, patient knows to discontinue the medication and contact us. Otezla Counseling: The side effects of Otezla were discussed with the patient, including but not limited to worsening or new depression, weight loss, diarrhea, nausea, upper respiratory tract infection, and headache. Patient instructed to call the office should any adverse effect occur.  The patient verbalized understanding of the proper use and possible adverse effects of Otezla.  All the patient's questions and concerns were addressed. Ketoconazole Pregnancy And Lactation Text: This medication is Pregnancy Category C and it isn't know if it is safe during pregnancy. It is also excreted in breast milk and breast feeding isn't recommended. Rituxan Pregnancy And Lactation Text: This medication is Pregnancy Category C and it isn't know if it is safe during pregnancy. It is unknown if this medication is excreted in breast milk but similar antibodies are known to be excreted. Tazorac Pregnancy And Lactation Text: This medication is not safe during pregnancy. It is unknown if this medication is excreted in breast milk. Siliq Counseling:  I discussed with the patient the risks of Siliq including but not limited to new or worsening depression, suicidal thoughts and behavior, immunosuppression, malignancy, posterior leukoencephalopathy syndrome, and serious infections.  The patient understands that monitoring is required including a PPD at baseline and must alert us or the primary physician if symptoms of infection or other concerning signs are noted. There is also a special program designed to monitor depression which is required with Siliq. Protopic Pregnancy And Lactation Text: This medication is Pregnancy Category C. It is unknown if this medication is excreted in breast milk when applied topically. Terbinafine Counseling: Patient counseling regarding adverse effects of terbinafine including but not limited to headache, diarrhea, rash, upset stomach, liver function test abnormalities, itching, taste/smell disturbance, nausea, abdominal pain, and flatulence.  There is a rare possibility of liver failure that can occur when taking terbinafine.  The patient understands that a baseline LFT and kidney function test may be required. The patient verbalized understanding of the proper use and possible adverse effects of terbinafine.  All of the patient's questions and concerns were addressed. Bactrim Pregnancy And Lactation Text: This medication is Pregnancy Category D and is known to cause fetal risk.  It is also excreted in breast milk. Azithromycin Pregnancy And Lactation Text: This medication is considered safe during pregnancy and is also secreted in breast milk. Azithromycin Counseling:  I discussed with the patient the risks of azithromycin including but not limited to GI upset, allergic reaction, drug rash, diarrhea, and yeast infections. Benzoyl Peroxide Pregnancy And Lactation Text: This medication is Pregnancy Category C. It is unknown if benzoyl peroxide is excreted in breast milk. Rifampin Counseling: I discussed with the patient the risks of rifampin including but not limited to liver damage, kidney damage, red-orange body fluids, nausea/vomiting and severe allergy. Zyclara Counseling:  I discussed with the patient the risks of imiquimod including but not limited to erythema, scaling, itching, weeping, crusting, and pain.  Patient understands that the inflammatory response to imiquimod is variable from person to person and was educated regarded proper titration schedule.  If flu-like symptoms develop, patient knows to discontinue the medication and contact us. Hydroquinone Counseling:  Patient advised that medication may result in skin irritation, lightening (hypopigmentation), dryness, and burning.  In the event of skin irritation, the patient was advised to reduce the amount of the drug applied or use it less frequently.  Rarely, spots that are treated with hydroquinone can become darker (pseudoochronosis).  Should this occur, patient instructed to stop medication and call the office. The patient verbalized understanding of the proper use and possible adverse effects of hydroquinone.  All of the patient's questions and concerns were addressed. Sski Pregnancy And Lactation Text: This medication is Pregnancy Category D and isn't considered safe during pregnancy. It is excreted in breast milk. Itraconazole Counseling:  I discussed with the patient the risks of itraconazole including but not limited to liver damage, nausea/vomiting, neuropathy, and severe allergy.  The patient understands that this medication is best absorbed when taken with acidic beverages such as non-diet cola or ginger ale.  The patient understands that monitoring is required including baseline LFTs and repeat LFTs at intervals.  The patient understands that they are to contact us or the primary physician if concerning signs are noted.

## 2024-06-10 ENCOUNTER — EMERGENCY (EMERGENCY)
Facility: HOSPITAL | Age: 69
LOS: 1 days | Discharge: HOME | End: 2024-06-10
Attending: EMERGENCY MEDICINE | Admitting: EMERGENCY MEDICINE
Payer: MEDICARE

## 2024-06-10 VITALS
DIASTOLIC BLOOD PRESSURE: 96 MMHG | HEART RATE: 68 BPM | SYSTOLIC BLOOD PRESSURE: 137 MMHG | RESPIRATION RATE: 18 BRPM | OXYGEN SATURATION: 98 % | TEMPERATURE: 97 F

## 2024-06-10 VITALS
HEART RATE: 69 BPM | TEMPERATURE: 98 F | WEIGHT: 134.92 LBS | DIASTOLIC BLOOD PRESSURE: 61 MMHG | OXYGEN SATURATION: 98 % | SYSTOLIC BLOOD PRESSURE: 111 MMHG | RESPIRATION RATE: 18 BRPM | HEIGHT: 71 IN

## 2024-06-10 DIAGNOSIS — F19.10 OTHER PSYCHOACTIVE SUBSTANCE ABUSE, UNCOMPLICATED: ICD-10-CM

## 2024-06-10 DIAGNOSIS — Z86.011 PERSONAL HISTORY OF BENIGN NEOPLASM OF THE BRAIN: ICD-10-CM

## 2024-06-10 DIAGNOSIS — Z87.898 PERSONAL HISTORY OF OTHER SPECIFIED CONDITIONS: Chronic | ICD-10-CM

## 2024-06-10 PROCEDURE — 99283 EMERGENCY DEPT VISIT LOW MDM: CPT

## 2024-06-10 PROCEDURE — 82962 GLUCOSE BLOOD TEST: CPT

## 2024-06-10 PROCEDURE — 99284 EMERGENCY DEPT VISIT MOD MDM: CPT

## 2024-06-10 NOTE — ED ADULT NURSE NOTE - OBJECTIVE STATEMENT
68yM BIBEMS for a medical evaluation. Pt states he fell. Pt appears disheveled and has poor hygiene. States he did cocaine last night. Denies any other drug or alcohol use today. Denies any symptoms.

## 2024-06-10 NOTE — ED ADULT TRIAGE NOTE - CHIEF COMPLAINT QUOTE
pt presents to ER by EMS after bystander called 911 because patient was laying on the ground singing. upon arrival to ER, pt alert and oriented x3 and responding to questions appropriately. states he did cocaine today. denies alcohol use. denies any complaints or pain. pt appears disheveled and smells malodorous.

## 2024-06-10 NOTE — ED PROVIDER NOTE - PHYSICAL EXAMINATION
VITAL SIGNS: I have reviewed nursing notes and confirm.  CONSTITUTIONAL:  in no acute distress.   SKIN:  warm and dry, no acute rash.   HEAD:  normocephalic, atraumatic.  EYES: PERRL, EOM intact; conjunctiva and sclera clear.  ENT: No nasal discharge; airway clear.   NECK: Supple; non tender.  CARD: S1, S2 normal; no murmurs, gallops, or rubs. Regular rate and rhythm.   RESP:  Clear to auscultation b/l, no wheezes, rales or rhonchi.  ABD: Normal bowel sounds; soft; non-distended; non-tender; no guarding/ rebound. lg L inguinal hernia extending into testes - nontender, no erythema  MSK: Normal ROM. No clubbing, cyanosis or edema. no ttp bilat le  NEURO: Alert, oriented, grossly unremarkable, R hemiparesis (baseline)  PSYCH: Cooperative, mood and affect appropriate.

## 2024-06-10 NOTE — SBIRT NOTE ADULT - NSSBIRTDRGPASSREFTXDET_GEN_A_CORE
Patient reports being interested in going to [facility] outpatient substance abuse treatment.

## 2024-06-10 NOTE — ED PROVIDER NOTE - OBJECTIVE STATEMENT
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  Pt denies complaint.  Pt reports mechanical fall resulting in him laying on the ground but denies injury.  No chi/loc, neck/back pain, ext pain or injury.  Pt ambulates w cane at baseline, denies difficulty.  No ha, change in vision/speech/gait, numbness or weakness in ext, cp, palpitations, sob, abd pain, n/v/d.  + cocaine use.

## 2024-06-10 NOTE — ED ADULT TRIAGE NOTE - BEFAST ARM SIDE DRIFT
"No

## 2024-06-10 NOTE — ED PROVIDER NOTE - CLINICAL SUMMARY MEDICAL DECISION MAKING FREE TEXT BOX
Pt biba after found on the ground.  Pt reports fall w/o injury.  Exam benign, ambulatory w cane in ER.  Pt wants assistance w drug use; SW consulted and pt now plans to go to [facility] for admit to detox/rehab.  Darell bingham.

## 2024-06-10 NOTE — ED PROVIDER NOTE - PATIENT PORTAL LINK FT
You can access the FollowMyHealth Patient Portal offered by [facility] by registering at the following website: [email or web address]. By joining [facility]’s FollowMyHealth portal, you will also be able to view your health information using other applications (apps) compatible with our system.

## 2024-06-10 NOTE — ED PROVIDER NOTE - NSFOLLOWUPINSTRUCTIONS_ED_ALL_ED_FT
Substance abuse    Please go to detox.      Substance Abuse    Chemical dependency is an addiction to drugs or alcohol. It is characterized by the repeated behavior of seeking out and using drugs and alcohol despite harmful consequences to the health and safety of oneself and others. Using drugs in a manner that brought you to an Emergency Room suggests you may have an drug abuse problem. Seek help at a drug addiction center.    SEEK IMMEDIATE MEDICAL CARE IF YOU HAVE ANY OF THE FOLLOWING SYMPTOMS: chest pain, shortness of breath, change in mental status, thoughts about hurting killing yourself, thoughts about hurting or killing somebody else, hallucinations, or worsening depression.

## 2024-06-10 NOTE — ED ADULT NURSE NOTE - NSFALLRISKINTERV_ED_ALL_ED
Assistance OOB with selected safe patient handling equipment if applicable/Assistance with ambulation/Communicate fall risk and risk factors to all staff, patient, and family/Monitor gait and stability/Monitor for mental status changes and reorient to person, place, and time, as needed/Provide visual cue: yellow wristband, yellow gown, etc/Reinforce activity limits and safety measures with patient and family/Toileting schedule using arm’s reach rule for commode and bathroom/Use of alarms - bed, stretcher, chair and/or video monitoring/Call bell, personal items and telephone in reach/Instruct patient to call for assistance before getting out of bed/chair/stretcher/Non-slip footwear applied when patient is off stretcher/[place] to call system/Physically safe environment - no spills, clutter or unnecessary equipment/Purposeful Proactive Rounding/Room/bathroom lighting operational, light cord in reach

## 2024-06-10 NOTE — ED ADULT TRIAGE NOTE - BP NONINVASIVE DIASTOLIC (MM HG)
HAWATISH SCHUSTER  62y, Male    All available historical data reviewed    OVERNIGHT EVENTS:  isolated fevers  non communicative    ROS:    VITALS:  T(F): 98.9, Max: 101 (03-23-20 @ 20:06)  HR: 81  BP: 102/59  RR: 18Vital Signs Last 24 Hrs  T(C): 37.2 (24 Mar 2020 04:41), Max: 38.3 (23 Mar 2020 20:06)  T(F): 98.9 (24 Mar 2020 04:41), Max: 101 (23 Mar 2020 20:06)  HR: 81 (24 Mar 2020 06:59) (70 - 100)  BP: 102/59 (24 Mar 2020 06:59) (84/47 - 112/60)  BP(mean): --  RR: 18 (24 Mar 2020 04:41) (18 - 18)  SpO2: 93% (23 Mar 2020 20:06) (93% - 93%)    TESTS & MEASUREMENTS:                        12.3   2.86  )-----------( 139      ( 24 Mar 2020 06:45 )             36.7     03-24    142  |  106  |  14  ----------------------------<  119<H>  4.2   |  25  |  0.5<L>    Ca    7.9<L>      24 Mar 2020 06:45  Phos  2.2     03-23  Mg     1.9     03-23    TPro  5.8<L>  /  Alb  3.3<L>  /  TBili  0.2  /  DBili  x   /  AST  29  /  ALT  16  /  AlkPhos  66  03-23    LIVER FUNCTIONS - ( 23 Mar 2020 07:20 )  Alb: 3.3 g/dL / Pro: 5.8 g/dL / ALK PHOS: 66 U/L / ALT: 16 U/L / AST: 29 U/L / GGT: x             Culture - Urine (collected 03-20-20 @ 20:18)  Source: .Urine Clean Catch (Midstream)  Final Report (03-22-20 @ 17:22):    >=3 organisms. Probable collection contamination.    Culture - Blood (collected 03-20-20 @ 19:00)  Source: .Blood Blood-Peripheral  Preliminary Report (03-22-20 @ 02:14):    No growth to date.    Culture - Blood (collected 03-20-20 @ 19:00)  Source: .Blood Blood-Peripheral  Preliminary Report (03-22-20 @ 02:14):    No growth to date.            RADIOLOGY & ADDITIONAL TESTS:  Personal review of radiological diagnostics performed  Echo and EKG results noted when applicable.     MEDICATIONS:  acetaminophen   Tablet .. 650 milliGRAM(s) Oral every 6 hours PRN  baclofen 10 milliGRAM(s) Oral three times a day  chlorhexidine 4% Liquid 1 Application(s) Topical <User Schedule>  enoxaparin Injectable 40 milliGRAM(s) SubCutaneous daily  pantoprazole   Suspension 40 milliGRAM(s) Oral before breakfast  PHENobarbital 64.8 milliGRAM(s) Oral three times a day      ANTIBIOTICS: 61

## 2024-06-10 NOTE — CHART NOTE - NSCHARTNOTEFT_GEN_A_CORE
SW met with patient at bedside and explained role.  Patient is a 68 year old male who came to the ED due to medical evaluation.  Patient abuses heroin and cocaine.  Patient lives in a shelter on [facility].  SW completed an SBIRT evaluation.  SW discussed referral to  outpatient substance abuse treatment.  Patient has a long history of using heroin and cocaine.  Patient agreed to attend [facility] outpatient substance abuse treatment.  SW have patient a new pair of pants.   Patient will be given MTA card to return to Shelter.

## 2024-06-26 NOTE — ED PROVIDER NOTE - IV ALTEPLASE INCLUSION HIDDEN
52 YO M with past medical history of AFib (on Eliquis), indwelling Aguilera, cerebral aneurysm, CAD (s/p stents), CVA, T2DM, HTN, OM (s/p debridement), PE, perforated gastric ulcer s/p ometnopexy 2019 with Dr. Mejia, transferred from [facility] for difficulty in breathing.      CAD, Afib, s/p Cardiac Arrest, Mod-Severe MR, HTN  - s/p cardiac arrest x2 w/ AHRF.  s/p intubation with successful extubation.  On pressors while in ICU  - Now tolerating RA    - Trops + but no sig trend. No clear evidence of acute ischemia  - Continue ASA and statin      - Known AFib, now rate-control.  At times, moderately rapid at 110's   - Continue Cardizem and BB  - s/p FD Lovenox, now on Eliquis    - TTE normal EF and mod-severe MR  - no sign of volume overload on exam  - c/w Lasix 20mg po qd    - ATN with creat normalized  - Monitor and replete lytes, keep K>4, Mg>2.  - Will continue to follow.    Henrietta Kim DNP, NP-C, AGACNP-C  Cardiology   Call TEAMS        show

## 2024-08-22 ENCOUNTER — EMERGENCY (EMERGENCY)
Facility: HOSPITAL | Age: 69
LOS: 1 days | Discharge: LEFT BEFORE DISCHARGE | End: 2024-08-22
Admitting: EMERGENCY MEDICINE
Payer: MEDICARE

## 2024-08-22 VITALS
DIASTOLIC BLOOD PRESSURE: 54 MMHG | SYSTOLIC BLOOD PRESSURE: 91 MMHG | WEIGHT: 149.91 LBS | TEMPERATURE: 98 F | OXYGEN SATURATION: 94 % | HEIGHT: 72 IN | RESPIRATION RATE: 18 BRPM | HEART RATE: 68 BPM

## 2024-08-22 DIAGNOSIS — Z87.898 PERSONAL HISTORY OF OTHER SPECIFIED CONDITIONS: Chronic | ICD-10-CM

## 2024-08-22 PROCEDURE — 82962 GLUCOSE BLOOD TEST: CPT

## 2024-08-22 PROCEDURE — L9991: CPT

## 2024-08-25 DIAGNOSIS — R41.82 ALTERED MENTAL STATUS, UNSPECIFIED: ICD-10-CM

## 2024-08-25 DIAGNOSIS — Z53.21 PROCEDURE AND TREATMENT NOT CARRIED OUT DUE TO PATIENT LEAVING PRIOR TO BEING SEEN BY HEALTH CARE PROVIDER: ICD-10-CM

## 2024-10-25 ENCOUNTER — EMERGENCY (EMERGENCY)
Facility: HOSPITAL | Age: 69
LOS: 1 days | Discharge: HOME | End: 2024-10-25
Admitting: EMERGENCY MEDICINE
Payer: MEDICARE

## 2024-10-25 VITALS
DIASTOLIC BLOOD PRESSURE: 82 MMHG | SYSTOLIC BLOOD PRESSURE: 134 MMHG | RESPIRATION RATE: 16 BRPM | TEMPERATURE: 97 F | OXYGEN SATURATION: 98 % | HEART RATE: 89 BPM

## 2024-10-25 DIAGNOSIS — Z87.898 PERSONAL HISTORY OF OTHER SPECIFIED CONDITIONS: Chronic | ICD-10-CM

## 2024-10-25 PROCEDURE — 99283 EMERGENCY DEPT VISIT LOW MDM: CPT

## 2024-10-25 RX ORDER — ACETAMINOPHEN 325 MG
650 TABLET ORAL ONCE
Refills: 0 | Status: COMPLETED | OUTPATIENT
Start: 2024-10-25 | End: 2024-10-25

## 2024-10-25 RX ADMIN — Medication 650 MILLIGRAM(S): at 17:00

## 2024-10-25 NOTE — ED ADULT NURSE NOTE - CHIEF COMPLAINT QUOTE
pt here for R toe pain x2 days. denies injury/trauma. denies hx of dm. denies f/c.  endorses heroin use, last use 3 days ago.  pt in NAD, ambulatory with steady gait.

## 2024-10-25 NOTE — ED PROVIDER NOTE - OBJECTIVE STATEMENT
The pt is a 68 y/o undomicile M, who presents to ED c/o R toe pain x few d. Admits to using heroin daily. Denies trauma, fall, pus, bleeding, injecting into foot/toe, fevers, chills, decreased ROM.

## 2024-10-25 NOTE — ED PROVIDER NOTE - CARE PROVIDER_API CALL
Jose Montero  Podiatric Medicine and Surgery  [address] [place] [address], [address]  [place] [address]  Phone: [phone]  Fax: [phone]  Follow Up Time:

## 2024-10-25 NOTE — ED ADULT NURSE NOTE - OBJECTIVE STATEMENT
Pt presents to ED C/O side of R toe pain x 2 days. Pt skin appears dry and flaking on assessment. Ambulating well independently. Denies oozing, bleeding, fever.

## 2024-10-25 NOTE — ED PROVIDER NOTE - NSFOLLOWUPINSTRUCTIONS_ED_ALL_ED_FT
Corns and Calluses: What to Know  A close-up view of the toes, showing a corn on the side of the little toe.  A close-up view of the bottom of a foot, showing a callus.  Corns and calluses are skin conditions that can cause discomfort. Corns are small areas of thickened skin that usually form on a toe. They have a cone-shaped core that can press on nerves, causing pain.  Calluses are larger areas of thickened skin that can form anywhere on the body. They often show up on the hands, soles of the feet, and heels. Calluses don't have a pointy core like corns do.  What are the causes?  Corns and calluses are caused by rubbing or pressure. Tight or bad-fitting shoes are often what cause these things.  What increases the risk?  Corns  Corns are more likely to develop in people who have misshapen toes, such as hammertoes.  Calluses  Calluses are more likely to develop in people who:  Work with their hands.  Wear shoes that fit poorly, are too tight, or have high heels.  Have misshapen toes.  Some conditions make you more likely to get calluses and can lead to problems like ulceration or infection. These include:  Diabetes.  Poor blood flow.  Numbness in your feet.  What are the signs or symptoms?  Symptoms of a corn or callus include:  A hard growth on the skin.  Pain or tenderness under the skin.  Redness and swelling.  More discomfort when wearing tight shoes, if your feet are affected.  If a corn or callus becomes infected, symptoms may include:  Redness and swelling that gets worse.  Pain.  Fluid, blood, or pus coming out of the corn or callus.  How is this diagnosed?  Corns and calluses may be diagnosed based on your:  Symptoms.  Medical history.  Physical exam.  How is this treated?  Treatment for corns and calluses may include:  Fixing the cause, like changing shoes, wearing gloves, or using shoe inserts or protective pads.  Applying lotion to soften the skin. This may include using a medicated moisturizer.  Gently removing dead layers of skin.  Removing the corn or callus with a scalpel or laser. This is done by a health care provider who specializes in skin or feet (dermatologist or podiatrist).  Taking antibiotics, if it's infected.  Having surgery for a misshapen toe.  Follow these instructions at home:  A person sitting down looking at the bottom of the foot that has a callus on it.  Take your medicine only as told.  If you were given antibiotics, take them as told. Do not stop taking them even if you start to feel better.  Wear shoes that fit well. Avoid wearing shoes that have high heels or are too tight or too loose.  Wear padding, protective layers, gloves, or orthotics as told.  Soak your hands or feet and file the corn or callus with a file or pumice stone. Do this as told by your provider.  Check your corn or callus every day for signs of infection.  Contact a health care provider if:  Your symptoms don't get better with treatment.  You have redness or swelling that gets worse.  Your corn or callus is painful.  You have fluid, blood, or pus coming from your corn or callus.  You have new symptoms.  Get help right away if:  You have very bad pain with redness.

## 2024-10-25 NOTE — ED PROVIDER NOTE - PATIENT PORTAL LINK FT
You can access the FollowMyHealth Patient Portal offered by [facility] by registering at the following website: [email or web address]. By joining [employer]’s FollowMyHealth portal, you will also be able to view your health information using other applications (apps) compatible with our system.

## 2024-10-25 NOTE — ED ADULT TRIAGE NOTE - CHIEF COMPLAINT QUOTE
pt here for R toe pain x2 days. denies injury/trauma. denies hx of dm. denies f/c.  pt in NAD, ambulatory with steady gait. pt here for R toe pain x2 days. denies injury/trauma. denies hx of dm. denies f/c.  endorses heroin use, last use 3 days ago.  pt in NAD, ambulatory with steady gait.

## 2024-10-25 NOTE — ED PROVIDER NOTE - CLINICAL SUMMARY MEDICAL DECISION MAKING FREE TEXT BOX
undomicile m c/o r big toe pain - has calluses on exam, no cellulitis or abscess, no concern for fx. no indication for xrays, pt requesting methadone or narcotics for pain control, offered socks and tyl - podiatry clinic for f/u

## 2024-10-25 NOTE — ED PROVIDER NOTE - MUSCULOSKELETAL, MLM
Spine appears normal, range of motion is not limited, no muscle or joint tenderness; Feet: very poor pedal hygiene b/l, + multiple calluses and very dry cracked skin, +  toenail fungus, no bony tend, no erythema/swelling/pus, pedal pulses 2+ b/l, FROM, normal gait

## 2024-10-25 NOTE — ED PROVIDER NOTE - ENMT, MLM
Airway patent, Nasal mucosa clear. Mouth with very poor dentition. Throat has no vesicles, no oropharyngeal exudates and uvula is midline.

## 2024-10-28 DIAGNOSIS — B35.1 TINEA UNGUIUM: ICD-10-CM

## 2024-10-28 DIAGNOSIS — M79.674 PAIN IN RIGHT TOE(S): ICD-10-CM

## 2024-10-28 DIAGNOSIS — L84 CORNS AND CALLOSITIES: ICD-10-CM

## 2024-10-28 DIAGNOSIS — R46.0 VERY LOW LEVEL OF PERSONAL HYGIENE: ICD-10-CM

## 2024-10-28 DIAGNOSIS — Z59.00 HOMELESSNESS UNSPECIFIED: ICD-10-CM

## 2024-10-28 SDOH — ECONOMIC STABILITY - HOUSING INSECURITY: HOMELESSNESS UNSPECIFIED: Z59.00

## 2024-11-13 NOTE — ED PROVIDER NOTE - NS ED MD DISPO DISCHARGE CCDA
HPI:    Nora Nova is a 23 year old female who presents for an ER follow up.              ALLERGIES:   Allergen Reactions    Penicillins HIVES       Current Outpatient Medications   Medication Sig Dispense Refill    Tri-Lo-Ann Marie 0.18/0.215/0.25 MG-25 MCG tablet TAKE 1 TABLET DAILY 84 tablet 3    budesonide (Pulmicort) 0.5 MG/2ML nebulizer suspension Mix 1 respuel of Pulmicort into 4 ounces of distilled saline irrigations recipe. Irrigate 2 ounces into each nostril once daily. 180 mL 3    budesonide (Pulmicort) 0.5 MG/2ML nebulizer suspension Mix 1 respuel of Pulmicort into 4 ounces of distilled saline irrigations recipe. Irrigate 2 ounces into each nostril once daily. 60 mL 3    HYDROcodone-acetaminophen (NORCO) 5-325 MG per tablet Take 1 tablet by mouth every 4 hours as needed for Pain. 20 tablet 0    methylPREDNISolone (MEDROL DOSEPAK) 4 MG tablet Take 1 tablet by mouth as directed. See package instructions. 21 tablet 0    albuterol 108 (90 Base) MCG/ACT inhaler INHALE 2 PUFFS BY MOUTH EVERY 4 HOURS AS NEEDED FOR SHORTNESS OF BREATH AND FOR WHEEZING 27 g 3    betamethasone dipropionate (DIPROSONE) 0.05 % cream Apply twice daily strictly to affected areas for 2 weeks; AVOID use on face and body folds 45 g 1    triamcinolone (ARISTOCORT) 0.1 % ointment Apply 1 Application topically in the morning and 1 Application in the evening. 30 g 1    omeprazole (PriLOSEC) 40 MG capsule Take 1 capsule by mouth daily. 30 capsule 3    beclomethasone diprop HFA (QVAR REDIHALER) 80 MCG/ACT inhaler Inhale 1 puff into the lungs in the morning and 1 puff in the evening. 1 each 3    benzonatate (TESSALON PERLES) 200 MG capsule Take 1 capsule by mouth 3 times daily as needed for Cough. 30 capsule 0    Spacer/Aero-Holding Chambers (EQ Space Chamber Anti-Static) Device USE AS DIRECTED      Spacer/Aero-Hold Chamber Bags [facility] to determine type based on insurance 1 each 1    albuterol (VENTOLIN) (2.5 MG/3ML) 0.083% nebulizer  solution Take 3 mLs by nebulization every 6 hours as needed for Shortness of Breath. 75 mL 0    cholecalciferol 1.25 mg (50,000 units) tablet Take 1 tablet by mouth 1 day a week. 12 tablet 0    levothyroxine 75 MCG tablet Take 1 tablet by mouth daily. 90 tablet 3     No current facility-administered medications for this visit.       Review of systems:   Review of Systems   Constitutional: Negative.  Negative for appetite change, fatigue, fever and unexpected weight change.   HENT: Negative.     Respiratory: Negative.  Negative for cough and shortness of breath.    Cardiovascular: Negative.  Negative for chest pain, palpitations and leg swelling.   Gastrointestinal: Negative.  Negative for abdominal pain, nausea and vomiting.   Genitourinary: Negative.    Musculoskeletal: Negative.  Negative for myalgias.   Skin: Negative.  Negative for rash.   Neurological: Negative.  Negative for dizziness and headaches.   Psychiatric/Behavioral: Negative.  Negative for confusion.    All other systems reviewed and are negative.        Physical Examination:  Physical Exam  Vitals and nursing note reviewed.   Constitutional:       General: She is not in acute distress.     Appearance: Normal appearance.      Comments: Pleasant. NAD. Nontoxic appearing. Normal vitals. Normal speech and insight.      HENT:      Head: Normocephalic and atraumatic.   Eyes:      Conjunctiva/sclera: Conjunctivae normal.   Cardiovascular:      Rate and Rhythm: Normal rate and regular rhythm.      Pulses: Normal pulses.      Heart sounds: Normal heart sounds. No murmur heard.  Pulmonary:      Effort: Pulmonary effort is normal. No respiratory distress.      Breath sounds: Normal breath sounds. No stridor. No wheezing, rhonchi or rales.   Musculoskeletal:      Right lower leg: No edema.      Left lower leg: No edema.   Skin:     General: Skin is warm and dry.      Capillary Refill: Capillary refill takes 2 to 3 seconds.      Findings: No erythema or rash.    Neurological:      General: No focal deficit present.      Mental Status: She is alert. Mental status is at baseline.   Psychiatric:         Mood and Affect: Mood normal.         Behavior: Behavior normal.         Thought Content: Thought content normal.         Judgment: Judgment normal.                 Labs:   No visits with results within 1 Month(s) from this visit.   Latest known visit with results is:   Hospital Outpatient Visit on 07/05/2024   Component Date Value Ref Range Status    URINE PREGNANCY,QUAL 07/05/2024 Negative  Negative Final    Internal Procedural Controls Accep* 07/05/2024 Yes  Yes Final    TEST LOT NUMBER 07/05/2024 [id number]   Final    TEST LOT EXPIRATION DATE 07/05/2024 11/14/2025   Final    Case Report 07/05/2024    Final                    Value:Surgical Pathology                                Case: [id number]                                 Authorizing Provider:  Ish Steen MD      Collected:           07/05/2024 1026              Ordering Location:     [facility]  Received:            07/08/2024 0805                                     Center                                                                       Pathologist:           Brendon Ocampo MD                                                            Specimen:    Sinus, bilateral sinus contents                                                            Pathologic Diagnosis 07/05/2024    Final                    Value:A.  Submitted as bilateral sinus contents:                          -Fragments of benign sinonasal type mucosa and bone with chronic                           inflammation, fibrosis, and reactive changes                          -Focal prominent eosinophilic infiltrates noted                          -Predominantly inflammatory mucus with neutrophils and eosinophils                          -Compatible with chronic sinusitis                          -Septal bone and cartilage noted on gross  examination                          -No definitive fungal organisms identified on GMS stain    Clinical Information 07/05/2024    Final                    Value:Order / Surgery Diagnosis:                          Chronic maxillary sinusitis [J32.0]                          DNS (deviated nasal septum) [J34.2]                          Hypertrophy of nasal turbinates [J34.3]                                                    Radiology Diagnosis:                          No Dx found.                                                                                  Gross Description 07/05/2024    Final                    Value:A.                          Received in formalin with the proper patient identifications and labeled                           \"bilateral sinus contents\", are multiple fragments of brown-tan soft                           tissue and mucoid material aggregating 4 x 3.5 x 0.8 cm and 3 fragments of                           irregularly-shaped thin bone and cartilage aggregating 2.2 x 1.4 x 0.6 cm.                            The bone and cartilage fragments appear to be from septal tissue on gross                           examination.  Representatively submitted in 1 cassette (bone and cartilage                           for gross diagnosis only)                                                    Bere Ocampo MD 07/08/24 10:49 AM                              Disclaimer 07/05/2024    Final                    Value:The attending pathologist whose signature appears on this report has                           reviewed the diagnostic studies and has edited the gross and/or                           microscopic portion of the report in rendering the final diagnosis.All                           specimens are received in formalin, unless otherwise specified in the                           gross description. Specimen testing is performed on formalin-fixed                           paraffin-embedded  sections unless otherwise specified.                                                    The immunohistochemical, FISH, or ANNE reagents (if any) were developed and                           their performance characteristics determined by Located within [facility].  Some of                           the immunohistochemical reagents (if utilized) have not been cleared or                           approved by the US Food and Drug Administration. The FDA does not require                           this test to go through premarket FDA review. This test is used for                           clinical purposes. It should not be regarded as investigational or for                           research. This laboratory is certified under the Clinical Laboratory                           Improvement Amendments (CLIA) as qualified to perform high complexity                           clinical laboratory testing. All controls show appropriate reactivity.                                                    Chromogenic In-Situ Hybridization (CISH) staining for cases originating at                           [facility] is performed at the [facility]                           [address] [place] [address] and Chromogenic In-Situ                           Hybridization (CISH) staining for cases originating at [facility]                           is performed at the [facility] at [address] [place] [address]. Fluorescent In-Situ Hybridization (FISH)                           staining and interpretation for both [facility] is performed at the [facility] [address] [place] [address].          Assessment and Plan:              Treatment options discussed with patient or guardian and explained in detail. The risks, benefits and potential side effects of possible medications were reviewed.  Alternatives were discussed. The patient or guardian agreed with the plan. Monitoring parameters and expected course outlined in detail, and patient gave verbal understanding.     Advised supportive care and follow-up recommendations.Advised on worsening signs and symptoms to watch for and reasons to return or go to the ER immediately. Patient left the office in stable condition.    This note was generated using assisted voice technology. There may be errors in verbiage. Please contact me for further clarification on errors or misunderstandings.     Ambika Dawkins PA-C, MMS, MPH, DMSc        Follow-up:   No follow-ups on file.       Medications this visit:   No orders of the defined types were placed in this encounter.             Pepcid at night. She should continue her current regimen of Claritin, Pulmicort, and Qvar twice daily. Advised she needs to have a regimen and continue with this to keep her symptoms at bay. If worsening again, pt advised to call 911 or go to ER. She was also advised to address the mold issue in her home.          Treatment options discussed with patient or guardian and explained in detail. The risks, benefits and potential side effects of possible medications were reviewed. Alternatives were discussed. The patient or guardian agreed with the plan. Monitoring parameters and expected course outlined in detail, and patient gave verbal understanding.     Advised supportive care and follow-up recommendations.Advised on worsening signs and symptoms to watch for and reasons to return or go to the ER immediately. Patient left the office in stable condition.    This note was generated using assisted voice technology. There may be errors in verbiage. Please contact me for further clarification on errors or misunderstandings.     Ambkia Dawkins PA-C, MMS, MPH, DMSc        Follow-up:   No follow-ups on file.       Medications this visit:   No orders of the defined types were placed in this encounter.             Patient/Caregiver provided printed discharge information.

## 2024-11-21 ENCOUNTER — EMERGENCY (EMERGENCY)
Facility: HOSPITAL | Age: 69
LOS: 1 days | Discharge: HOME | End: 2024-11-21
Admitting: EMERGENCY MEDICINE
Payer: MEDICARE

## 2024-11-21 VITALS
RESPIRATION RATE: 18 BRPM | OXYGEN SATURATION: 93 % | TEMPERATURE: 98 F | SYSTOLIC BLOOD PRESSURE: 101 MMHG | HEIGHT: 70 IN | DIASTOLIC BLOOD PRESSURE: 78 MMHG | WEIGHT: 139.99 LBS | HEART RATE: 101 BPM

## 2024-11-21 DIAGNOSIS — Z87.898 PERSONAL HISTORY OF OTHER SPECIFIED CONDITIONS: Chronic | ICD-10-CM

## 2024-11-21 PROCEDURE — 99282 EMERGENCY DEPT VISIT SF MDM: CPT

## 2024-11-21 PROCEDURE — 99283 EMERGENCY DEPT VISIT LOW MDM: CPT

## 2024-11-21 RX ORDER — ACETAMINOPHEN 500 MG
650 TABLET ORAL ONCE
Refills: 0 | Status: COMPLETED | OUTPATIENT
Start: 2024-11-21 | End: 2024-11-21

## 2024-11-21 RX ADMIN — Medication 650 MILLIGRAM(S): at 16:40

## 2024-11-21 NOTE — ED PROVIDER NOTE - OBJECTIVE STATEMENT
70 y/o m undomiciled presents c/o b/l leg pain "for months".  Pt stating pain is unchanged today, always has some trouble ambulating but does so with a cane and no increased difficulty ambulating today.  Denies numbness/tingling to ext, weakness, trauma, all other ROS negative.

## 2024-11-21 NOTE — ED PROVIDER NOTE - MUSCULOSKELETAL, MLM
b/l legs no TTP, no swelling, no skin discoloration, DP/PT 2+ b/l, strength 5/5 b/l, sensation intact distally b/l, skin intact, no open wounds

## 2024-11-21 NOTE — ED ADULT NURSE NOTE - NS PRO PASSIVE SMOKE EXP
Pt received to wellness. pt is AxO 3 and ambulatory. 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 Respirations even and unlabored. pt denies chest pain, headaches, dizziness, N/V/D, abdominal pain, SOB, or fevers. Pt medicated as prescribed by provider. Plan of care ongoing. Unknown

## 2024-11-21 NOTE — ED ADULT TRIAGE NOTE - WEIGHT METHOD
Called patient to verify what days patient need work excuse for. Patient states that she need work excuse for Monday 2,2017, Tuesday 28th, 2017, and Wednesday 3/1/2017. Please let me know if patient is approved to get work excuse for these days.    stated

## 2024-11-21 NOTE — ED ADULT NURSE NOTE - CHIEF COMPLAINT QUOTE
Pt's mother is calling about using the procedure wipes and what kind of pain medication will be prescribed     RN addressed above questions [place] [employer] c/o bilateral leg pain for several wks. Pt ambulating independently

## 2024-11-21 NOTE — ED PROVIDER NOTE - PATIENT PORTAL LINK FT
You can access the FollowMyHealth Patient Portal offered by [facility] by registering at the following website: [email or web address]. By joining [employer]’s FollowMyHealth portal, you will also be able to view your health information using other applications (apps) compatible with our system.

## 2024-11-21 NOTE — ED PROVIDER NOTE - CLINICAL SUMMARY MEDICAL DECISION MAKING FREE TEXT BOX
70 y/o m presents c/o b/l leg pain, chronic and unchanged.  No trauma, pt ambulatory in ED, ext NVI, given tylenol for pain

## 2024-11-21 NOTE — ED ADULT NURSE NOTE - NSFALLRISKINTERV_ED_ALL_ED

## 2024-11-25 DIAGNOSIS — M79.604 PAIN IN RIGHT LEG: ICD-10-CM

## 2024-11-25 DIAGNOSIS — G89.29 OTHER CHRONIC PAIN: ICD-10-CM

## 2024-11-25 DIAGNOSIS — Z59.00 HOMELESSNESS UNSPECIFIED: ICD-10-CM

## 2024-11-25 SDOH — ECONOMIC STABILITY - HOUSING INSECURITY: HOMELESSNESS UNSPECIFIED: Z59.00

## 2024-12-30 NOTE — ED ADULT TRIAGE NOTE - NS ED NURSE AMBULANCES
Quality 134: Screening For Clinical Depression And Follow-Up Plan: Documentation stating the patient has had a diagnosis of depression or has had a diagnosis of bipolar disorder Quality 226: Preventive Care And Screening: Tobacco Use: Screening And Cessation Intervention: Patient screened for tobacco use, is a smoker AND received Cessation Counseling within measurement period or in the six months prior to the measurement period Quality 431: Preventive Care And Screening: Unhealthy Alcohol Use - Screening: Patient not identified as an unhealthy alcohol user when screened for unhealthy alcohol use using a systematic screening method [facility] Quality 130: Documentation Of Current Medications In The Medical Record: Current Medications Documented Detail Level: Generalized

## 2025-01-29 ENCOUNTER — EMERGENCY (EMERGENCY)
Facility: HOSPITAL | Age: 70
LOS: 1 days | Discharge: HOME | End: 2025-01-29
Attending: STUDENT IN AN ORGANIZED HEALTH CARE EDUCATION/TRAINING PROGRAM | Admitting: STUDENT IN AN ORGANIZED HEALTH CARE EDUCATION/TRAINING PROGRAM
Payer: MEDICARE

## 2025-01-29 VITALS
TEMPERATURE: 99 F | OXYGEN SATURATION: 95 % | DIASTOLIC BLOOD PRESSURE: 74 MMHG | RESPIRATION RATE: 16 BRPM | SYSTOLIC BLOOD PRESSURE: 118 MMHG | HEART RATE: 66 BPM

## 2025-01-29 DIAGNOSIS — M79.604 PAIN IN RIGHT LEG: ICD-10-CM

## 2025-01-29 DIAGNOSIS — Z59.00 HOMELESSNESS UNSPECIFIED: ICD-10-CM

## 2025-01-29 DIAGNOSIS — Z87.898 PERSONAL HISTORY OF OTHER SPECIFIED CONDITIONS: Chronic | ICD-10-CM

## 2025-01-29 DIAGNOSIS — M79.605 PAIN IN LEFT LEG: ICD-10-CM

## 2025-01-29 PROCEDURE — 99283 EMERGENCY DEPT VISIT LOW MDM: CPT

## 2025-01-29 RX ORDER — ACETAMINOPHEN 500 MG/5ML
650 LIQUID (ML) ORAL ONCE
Refills: 0 | Status: COMPLETED | OUTPATIENT
Start: 2025-01-29 | End: 2025-01-29

## 2025-01-29 RX ORDER — IBUPROFEN 200 MG
600 TABLET ORAL ONCE
Refills: 0 | Status: COMPLETED | OUTPATIENT
Start: 2025-01-29 | End: 2025-01-29

## 2025-01-29 RX ADMIN — Medication 650 MILLIGRAM(S): at 19:19

## 2025-01-29 RX ADMIN — Medication 600 MILLIGRAM(S): at 19:20

## 2025-01-29 SDOH — ECONOMIC STABILITY - HOUSING INSECURITY: HOMELESSNESS UNSPECIFIED: Z59.00

## 2025-02-12 ENCOUNTER — EMERGENCY (EMERGENCY)
Facility: HOSPITAL | Age: 70
LOS: 1 days | Discharge: HOME | End: 2025-02-12
Attending: STUDENT IN AN ORGANIZED HEALTH CARE EDUCATION/TRAINING PROGRAM | Admitting: STUDENT IN AN ORGANIZED HEALTH CARE EDUCATION/TRAINING PROGRAM
Payer: MEDICARE

## 2025-02-12 VITALS
SYSTOLIC BLOOD PRESSURE: 148 MMHG | HEART RATE: 89 BPM | RESPIRATION RATE: 18 BRPM | DIASTOLIC BLOOD PRESSURE: 60 MMHG | TEMPERATURE: 98 F | OXYGEN SATURATION: 97 %

## 2025-02-12 VITALS
SYSTOLIC BLOOD PRESSURE: 155 MMHG | RESPIRATION RATE: 16 BRPM | DIASTOLIC BLOOD PRESSURE: 79 MMHG | WEIGHT: 149.91 LBS | HEART RATE: 78 BPM | HEIGHT: 70 IN | TEMPERATURE: 98 F | OXYGEN SATURATION: 95 %

## 2025-02-12 DIAGNOSIS — T40.1X1A POISONING BY HEROIN, ACCIDENTAL (UNINTENTIONAL), INITIAL ENCOUNTER: ICD-10-CM

## 2025-02-12 DIAGNOSIS — Z87.898 PERSONAL HISTORY OF OTHER SPECIFIED CONDITIONS: Chronic | ICD-10-CM

## 2025-02-12 DIAGNOSIS — Z59.00 HOMELESSNESS UNSPECIFIED: ICD-10-CM

## 2025-02-12 DIAGNOSIS — R40.4 TRANSIENT ALTERATION OF AWARENESS: ICD-10-CM

## 2025-02-12 PROCEDURE — 82962 GLUCOSE BLOOD TEST: CPT

## 2025-02-12 PROCEDURE — 99283 EMERGENCY DEPT VISIT LOW MDM: CPT

## 2025-02-12 PROCEDURE — 99285 EMERGENCY DEPT VISIT HI MDM: CPT

## 2025-02-12 SDOH — ECONOMIC STABILITY - HOUSING INSECURITY: HOMELESSNESS UNSPECIFIED: Z59.00

## 2025-02-19 NOTE — ED ADULT NURSE NOTE - CHIEF COMPLAINT
<-- Click to add NO pertinent Family History
The patient is a 64y Male complaining of abdominal pain.

## 2025-02-20 NOTE — ED ADULT NURSE NOTE - CAS EDN DISCHARGE ASSESSMENT
Knee manipulation Post-operative Instructions     Guidelines and activities:   Plan on having a friend or family member drive you home from the surgery center/hospital. Make sure you have the afternoon and evening free of social and work obligations.    Began taking the pain medicine when you get home from your procedure. The knee will be painful for several days after the manipulation    You can put your full weight on the leg unless otherwise instructed by Dr. Flores. In some instances, crutches can be used for a period of time if walking is uncomfortable. Do not participate in any activities or movements that increase pain or discomfort until your review with Dr. Flores at your first post-op visit.     Perform knee range of motion (bend and straighten) as tolerated.     Apply cold to reduce pain and swelling. Use ice on the knee 20 minutes/on and 20 minutes/off for the first day when awake. Then apply cold as often as needed for 15 to 20 minutes at a time for the next several days. Place a towel or cloth between your skin and the ice to prevent skin injury     You may remove your Bandage 48-72 hours after surgery. Begin icing immediately after surgery to help decrease swelling and inflammation. You may shower once the bandage is removed.     Take Aspirin 81 mg, one tablet in the morning and one in the evening for 14 days. Unless you’re not able to tolerate aspirin (such as a history of stomach ulcers or an aspirin allergy), or you are already taking other blood thinning medications.     You can elect to wear elastic stockings (TREVIN) below the knee to prevent leg swelling and do at least 10 ankle pump exercises each hour to control swelling and to help prevent phlebitis (blood clots in the veins).     A lower grade temperature of <100.5 is fairly normal after surgery. Continue using tylenol to help suppress it. If your temperature measures >101 degrees please notify the office.     Post-Op 
Sent from [facility] for Gtube and winn replacement after removal by patient. Hx Dementia.  
Medications:    Oxycodone:   This is a narcotic pain medicine  This medication is to be taken AS NEEDED  Plan to stay on a scheduled dose of 1-2 tablets every 4-6 hrs for the first 2-3 days.   After 1-3 days you should be able to space out or discontinue this medication and transition to acetaminophen.   Do not operate machinery or drive a car on this medication.   Do not drink alcohol while on this medication     2. Acetaminophen (Tylenol):  Dose: Take two extra-strength (500mg) tablets every 8 hours.   Very important not to miss a dose.   This is a non-narcotic pain medicine   Do not exceed 4,000 mg of acetaminophen in a 24 hr period.   Refrain from alcohol while on this medication.     3. Enteric-Coated Aspirin 81mg:  This is a blood thinner used to help prevent blood clots following surgery.   This medication is to be taken twice a day for 30 days following surgery.   Please do not take this medicine on an empty stomach    4. Zofran (ondansetron):  This is an anti-nausea/vomiting medication  It is a dissolving tablet - place it on your tongue, allowing it to dissolve, and swallow.   Take 1 tablet as needed every 4-6 hrs for the first 2 days after surgery.     5. Colace (docusate sodium):   This medication is to help with constipation, a common side effect after taking narcotic pain medications(like oxycodone) and general anesthesia.   Take 1 pill in the morning and 1 pill in the evening to prevent constipation.  It is normal to take several days to make a bowel movement after surgery.   Drink plenty of clear liquids as the anesthesia can cause dehydration/constipation as well.   For any additional questions please contact my nurse    Any additional questions please contact the office.     Office Visit   Please return to Dr. Flores’s office approximately 10-14 days after your surgery. At this time, your knee ROM and overall progress will be checked.         Care After Anesthesia or Sedation    After 
Discharge  Due to the medicine given, someone must drive you home.  If possible, have someone stay with you at home the day of discharge and the night after surgery.  If you have infants or small children at home, please have someone help you for at least 24 hours after your surgery.  Do not drive for at least 24 hours after surgery (or as told by your doctor).  Rest for the remainder of the day. Go up and down stairs slowly.  Do not smoke after surgery.  Smoking can delay healing.  Do not operate heavy or potential harmful equipment.  Do not make legally binding decisions.  Do no drink alcohol for 24 hours.    Diet  Nausea may be expected for the first 24 to 48 hours.  Start eating a bland diet (toast, gelatin, 7-up, hot cereal, crackers, sherbet, broth, soup).  Drink plenty of fluids (6 to 8 glasses of water).  Resume your regular diet as able.  Avoid greasy or spicy foods for 24 hours.    Urination  The effects of anesthetics may cause some people to have trouble passing urine the day of surgery.  Drink a lot of fluids to help prevent this.  Try to urinate within 12 hours of surgery.  If you are unable to pass urine and feel like you need to, call your doctor or the hospital.    Pain Control  If your incision was injected with a long acting local anesthetic, it will wear off in 4 to 6 hours.  You can expect to have some pain at this time.  Treat your pain with the prescribed pain medicine before it wears off.  Do not wait until your pain becomes severe.    Ask your nurse when you had your last pain medicine, so you know when you can take another one after you get home.    Your last pain medicine was given at _________.    Call your doctor if you have:    Nausea and vomiting that does not stop  Fever over 101 degrees F.  Pain not relieved by pain medication  If you feel you have to pass urine and you are not able to do so.  Unusual changes in behavior  Dizziness  Hives     If you are not able to reach your doctor, 
you may call the emergency department.  
Patient baseline mental status

## 2025-04-05 NOTE — ED PROVIDER NOTE - NS ED ATTENDING STATEMENT MOD
show
Samples Given: Arazlo x 1
Detail Level: Zone
Initiate Treatment: Arazlo apply a pea sized amount to face qhs
Attending Only

## 2025-05-06 NOTE — ED ADULT TRIAGE NOTE - BP NONINVASIVE SYSTOLIC (MM HG)
Patient called, appt scheduled to eval wounds.  
Petra states pt fell 4/29, and blacked out. Pt went to [facility] and was transferred to [facility]. Petra states pt has 3 broken ribs (L side), (L) upper arm skin tear 10x8. Petra also stated they applied santyl to pt (R) ankle, and zero form guaze to pt (L) arm. Petra also stated they increased pt glipizide to 10 mg twice daily. Petra will also like someone to address pt wound care. Petra will like pt to get in and seen soon as possible, but pcp don't have openings till 5/29. Tried to get pt in on Friday to see Dr. Lancaster, pt refused seeing another provider. Petra told pt its best to be seen before the 29th. Hospital f/u has been made for 5/13 with Dr. Ellsworth.  Please advise.   
147

## 2025-05-16 NOTE — ED ADULT NURSE NOTE - WILL THE PATIENT ACCEPT THE PFIZER COVID-19 VACCINE IF ELIGIBLE AND IT IS AVAILABLE?
Please return if you have severely worsening fatigue, trouble walking, passing out, worsening shortness of breath, new concerning symptoms, or if you develop other emergent concerns. Otherwise please follow-up with your primary care doctor regarding your ER visit today.   
Yes

## 2025-05-29 ENCOUNTER — EMERGENCY (EMERGENCY)
Facility: HOSPITAL | Age: 70
LOS: 1 days | End: 2025-05-29
Admitting: EMERGENCY MEDICINE
Payer: MEDICARE

## 2025-05-29 VITALS
RESPIRATION RATE: 17 BRPM | WEIGHT: 130.07 LBS | TEMPERATURE: 98 F | DIASTOLIC BLOOD PRESSURE: 69 MMHG | HEIGHT: 71 IN | HEART RATE: 74 BPM | OXYGEN SATURATION: 97 % | SYSTOLIC BLOOD PRESSURE: 142 MMHG

## 2025-05-29 DIAGNOSIS — Z87.898 PERSONAL HISTORY OF OTHER SPECIFIED CONDITIONS: Chronic | ICD-10-CM

## 2025-05-29 PROCEDURE — 99283 EMERGENCY DEPT VISIT LOW MDM: CPT

## 2025-05-29 RX ORDER — IBUPROFEN 200 MG
600 TABLET ORAL ONCE
Refills: 0 | Status: COMPLETED | OUTPATIENT
Start: 2025-05-29 | End: 2025-05-29

## 2025-05-29 RX ADMIN — Medication 600 MILLIGRAM(S): at 13:09

## 2025-05-29 NOTE — ED PROVIDER NOTE - OBJECTIVE STATEMENT
70 yo m with no pmh c/o R great toe pain for months on and off. Denies trauma. Denies fever, chills, numbness, tingling. Pt seen in ED several times for similar complaint, had an XR in Feb.

## 2025-05-29 NOTE — ED PROVIDER NOTE - PATIENT PORTAL LINK FT
You can access the FollowMyHealth Patient Portal offered by [facility] by registering at the following website: [email or web address]. By joining [employer]’s FollowMyHealth portal, you will also be able to view your health information using other applications (apps) compatible with our system.

## 2025-05-29 NOTE — ED ADULT NURSE NOTE - NSFALLUNIVINTERV_ED_ALL_ED
Bed/Stretcher in lowest position, wheels locked, appropriate side rails in place/Call bell, personal items and telephone in reach/Instruct patient to call for assistance before getting out of bed/chair/stretcher/Non-slip footwear applied when patient is off stretcher/[place] to call system/Physically safe environment - no spills, clutter or unnecessary equipment/Purposeful proactive rounding/Room/bathroom lighting operational, light cord in reach

## 2025-05-29 NOTE — ED PROVIDER NOTE - CLINICAL SUMMARY MEDICAL DECISION MAKING FREE TEXT BOX
70 yo m with no pmh c/o R great toe pain for months on and off. Denies trauma. Denies fever, chills, numbness, tingling. R toe- +callous to R great toe with thickened toe nail, no erythema or warmth, no open ulcers, NV intact. will treat pain

## 2025-05-29 NOTE — ED ADULT TRIAGE NOTE - ESI TRIAGE ACUITY LEVEL, MLM
Pruritis ani 2/2 vigourous wiping and itching of the area.  -Desitin cream bid   -Recommend gentle wiping, sitz baths, avoiding spicy foods  -If sx persist, can consider 1% hydrocortisone cream to the area    4

## 2025-05-29 NOTE — ED PROVIDER NOTE - PHYSICAL EXAMINATION
CONSTITUTIONAL: Well-appearing;  in no apparent distress.   HEAD: Normocephalic; atraumatic.   CARDIOVASCULAR: rrr,  RESPIRATORY: Breathing easily;   MSK: R toe- +callous to R great toe with thickened toe nail, no erythema or warmth, no open ulcers, NV intact

## 2025-05-29 NOTE — ED PROVIDER NOTE - NSFOLLOWUPINSTRUCTIONS_ED_ALL_ED_FT
Take tylenol and or ibuprofen for pain    Please follow up with your primary care doctor in 24-48 hours. Return to ED for any worsening or emergent symptoms.

## 2025-05-29 NOTE — ED ADULT TRIAGE NOTE - CHIEF COMPLAINT QUOTE
Pt presents to ED C/O R foot pain x weeks. Pt denies known dx, denies new injury/ fall/ wounds. States, " It just hurts by the big toe". Walks with cane at baseline.

## 2025-06-02 DIAGNOSIS — L84 CORNS AND CALLOSITIES: ICD-10-CM

## 2025-06-02 DIAGNOSIS — M79.674 PAIN IN RIGHT TOE(S): ICD-10-CM

## 2025-06-27 ENCOUNTER — EMERGENCY (EMERGENCY)
Facility: HOSPITAL | Age: 70
LOS: 1 days | End: 2025-06-27
Admitting: EMERGENCY MEDICINE
Payer: MEDICARE

## 2025-06-27 VITALS
TEMPERATURE: 98 F | SYSTOLIC BLOOD PRESSURE: 132 MMHG | HEIGHT: 71 IN | WEIGHT: 154.98 LBS | DIASTOLIC BLOOD PRESSURE: 74 MMHG | OXYGEN SATURATION: 96 % | RESPIRATION RATE: 16 BRPM | HEART RATE: 94 BPM

## 2025-06-27 DIAGNOSIS — Z87.898 PERSONAL HISTORY OF OTHER SPECIFIED CONDITIONS: Chronic | ICD-10-CM

## 2025-06-27 PROCEDURE — 99283 EMERGENCY DEPT VISIT LOW MDM: CPT

## 2025-06-27 PROCEDURE — 99053 MED SERV 10PM-8AM 24 HR FAC: CPT

## 2025-06-27 PROCEDURE — 99282 EMERGENCY DEPT VISIT SF MDM: CPT

## 2025-06-27 NOTE — ED PROVIDER NOTE - NSFOLLOWUPINSTRUCTIONS_ED_ALL_ED_FT
follow up with podiatry    Corns and Calluses: What to Know  A close-up view of the toes, showing a corn on the side of the little toe.  A close-up view of the bottom of a foot, showing a callus.  Corns and calluses are skin conditions that can cause discomfort. Corns are small areas of thickened skin that usually form on a toe. They have a cone-shaped core that can press on nerves, causing pain.  Calluses are larger areas of thickened skin that can form anywhere on the body. They often show up on the hands, soles of the feet, and heels. Calluses don't have a pointy core like corns do.  What are the causes?  Corns and calluses are caused by rubbing or pressure. Tight or bad-fitting shoes are often what cause these things.  What increases the risk?  Corns  Corns are more likely to develop in people who have misshapen toes, such as hammertoes.  Calluses  Calluses are more likely to develop in people who:  Work with their hands.  Wear shoes that fit poorly, are too tight, or have high heels.  Have misshapen toes.  Some conditions make you more likely to get calluses and can lead to problems like ulceration or infection. These include:  Diabetes.  Poor blood flow.  Numbness in your feet.  What are the signs or symptoms?  Symptoms of a corn or callus include:  A hard growth on the skin.  Pain or tenderness under the skin.  Redness and swelling.  More discomfort when wearing tight shoes, if your feet are affected.  If a corn or callus becomes infected, symptoms may include:  Redness and swelling that gets worse.  Pain.  Fluid, blood, or pus coming out of the corn or callus.  How is this diagnosed?  Corns and calluses may be diagnosed based on your:  Symptoms.  Medical history.  Physical exam.  How is this treated?  Treatment for corns and calluses may include:  Fixing the cause, like changing shoes, wearing gloves, or using shoe inserts or protective pads.  Applying lotion to soften the skin. This may include using a medicated moisturizer.  Gently removing dead layers of skin.  Removing the corn or callus with a scalpel or laser. This is done by a health care provider who specializes in skin or feet (dermatologist or podiatrist).  Taking antibiotics, if it's infected.  Having surgery for a misshapen toe.  Follow these instructions at home:  A person sitting down looking at the bottom of the foot that has a callus on it.  Take your medicine only as told.  If you were given antibiotics, take them as told. Do not stop taking them even if you start to feel better.  Wear shoes that fit well. Avoid wearing shoes that have high heels or are too tight or too loose.  Wear padding, protective layers, gloves, or orthotics as told.  Soak your hands or feet and file the corn or callus with a file or pumice stone. Do this as told by your provider.  Check your corn or callus every day for signs of infection.  Contact a health care provider if:  Your symptoms don't get better with treatment.  You have redness or swelling that gets worse.  Your corn or callus is painful.  You have fluid, blood, or pus coming from your corn or callus.  You have new symptoms.  Get help right away if:  You have very bad pain with redness.

## 2025-06-27 NOTE — ED PROVIDER NOTE - CLINICAL SUMMARY MEDICAL DECISION MAKING FREE TEXT BOX
undomic[place] m c/o r toe pain - large callus to site, no signs of inf, and requesting cane - states that lost his, podiatry f/u provided, caned given as requested, pt understands to f/u. strict return precautions given

## 2025-06-27 NOTE — ED PROVIDER NOTE - CARE PROVIDER_API CALL
Jose Montero  Podiatry Foot & Ankle Surgery  [address]  [place] [address]  Phone: [phone]  Fax: [phone]  Follow Up Time:

## 2025-06-27 NOTE — ED PROVIDER NOTE - MUSCULOSKELETAL, MLM
Spine appears normal, range of motion is not limited, no muscle or joint tenderness; Feet: very poor hygiene b/l, multiple calluses and corns, no bleeding, no pus, no bony tend, FROM

## 2025-06-27 NOTE — ED ADULT NURSE NOTE - NSFALLUNIVINTERV_ED_ALL_ED
Bed/Stretcher in lowest position, wheels locked, appropriate side rails in place/Call bell, personal items and telephone in reach/Instruct patient to call for assistance before getting out of bed/chair/stretcher/Non-slip footwear applied when patient is off stretcher/[place] to call system/Physically safe environment - no spills, clutter or unnecessary equipment/Purposeful proactive rounding/Room/bathroom lighting operational, light cord in reach

## 2025-06-27 NOTE — ED PROVIDER NOTE - OBJECTIVE STATEMENT
The pt is a 70 y/o undomicile M, who presents to ED c/o R big toe pain and requesting a cane - states "I lost mine". Reports toe pain from poor fitting shoes and walking a lot. Denies trauma, fall, decreased ROM, pus, bleeding.

## 2025-06-27 NOTE — ED PROVIDER NOTE - PATIENT PORTAL LINK FT
You can access the FollowMyHealth Patient Portal offered by [facility] by registering at the following website: [email or web address]. By joining [email or web address]’s FollowMyHealth portal, you will also be able to view your health information using other applications (apps) compatible with our system.

## 2025-06-30 DIAGNOSIS — M79.671 PAIN IN RIGHT FOOT: ICD-10-CM

## 2025-06-30 DIAGNOSIS — Z59.00 HOMELESSNESS UNSPECIFIED: ICD-10-CM

## 2025-06-30 DIAGNOSIS — R46.0 VERY LOW LEVEL OF PERSONAL HYGIENE: ICD-10-CM

## 2025-06-30 DIAGNOSIS — G89.29 OTHER CHRONIC PAIN: ICD-10-CM

## 2025-06-30 SDOH — ECONOMIC STABILITY - HOUSING INSECURITY: HOMELESSNESS UNSPECIFIED: Z59.00

## 2025-07-27 ENCOUNTER — EMERGENCY (EMERGENCY)
Facility: HOSPITAL | Age: 70
LOS: 1 days | End: 2025-07-27
Admitting: EMERGENCY MEDICINE
Payer: MEDICARE

## 2025-07-27 VITALS
OXYGEN SATURATION: 98 % | DIASTOLIC BLOOD PRESSURE: 70 MMHG | HEART RATE: 66 BPM | TEMPERATURE: 98 F | SYSTOLIC BLOOD PRESSURE: 122 MMHG | RESPIRATION RATE: 18 BRPM

## 2025-07-27 VITALS
TEMPERATURE: 98 F | WEIGHT: 154.98 LBS | RESPIRATION RATE: 17 BRPM | HEIGHT: 71 IN | SYSTOLIC BLOOD PRESSURE: 119 MMHG | HEART RATE: 76 BPM | DIASTOLIC BLOOD PRESSURE: 61 MMHG | OXYGEN SATURATION: 100 %

## 2025-07-27 DIAGNOSIS — Z87.898 PERSONAL HISTORY OF OTHER SPECIFIED CONDITIONS: Chronic | ICD-10-CM

## 2025-07-27 PROCEDURE — 99283 EMERGENCY DEPT VISIT LOW MDM: CPT

## 2025-07-27 RX ORDER — IBUPROFEN 200 MG
600 TABLET ORAL ONCE
Refills: 0 | Status: COMPLETED | OUTPATIENT
Start: 2025-07-27 | End: 2025-07-27

## 2025-07-27 RX ADMIN — Medication 600 MILLIGRAM(S): at 14:27

## 2025-07-27 NOTE — ED ADULT NURSE NOTE - OBJECTIVE STATEMENT
Patient came to ER c/o body pain due to not "having usual dose of heroin". Patient is sleeping in chair at this time.

## 2025-07-27 NOTE — ED ADULT NURSE NOTE - CAS ELECT INFOMATION PROVIDED
Next dose of Tylenol will be on or after ___6pm________ ,today/tonight and every 6 hours afterwards for pain management, do not take any Tylenol containing products until this time. Your first dose of Tylenol was given at __12pm_________. Do not exceed more than 4000mg of Tylenol in one 24 hour setting. If no contraindications, you may alternate with Ibuprofen 3 hours after dose of Tylenol. Ibuprofen can be taken every 6 hours.
DC instructions

## 2025-07-27 NOTE — ED PROVIDER NOTE - ENMT, MLM
Airway patent, Nasal mucosa clear. Mouth with normal mucosa, poor dentition. Throat has no vesicles, no oropharyngeal exudates and uvula is midline.

## 2025-07-27 NOTE — ED PROVIDER NOTE - OBJECTIVE STATEMENT
The pt is a 70 y/o M, who presents to ED body aches - states "I think it's from heroin withdrawal" - last used 2 d ago (snorted), symptoms x 1.5 days. Denies n/v/d, abd pain, palpitations, dizziness, syncope, fevers, chills.

## 2025-07-27 NOTE — ED ADULT NURSE NOTE - CHIEF COMPLAINT QUOTE
Pt c/o R foot pain and generalized body discomfort "I am withdrawing from heroin". Pt endorses "snorting" heroin on Friday. Seen at [place] yesterday for same complaint. Denies cp, sob.

## 2025-07-27 NOTE — ED PROVIDER NOTE - CLINICAL SUMMARY MEDICAL DECISION MAKING FREE TEXT BOX
pt c/o body aches - states that symptoms related to heroin withdrawal, pt hemodynamically stable, nad, doubt withdrawal symptoms given lack of objective findings and symptoms reported, will give ibuprofen, detox list provided, no emergent indication for any labs, stable for dc, pt understands and agrees w/plan, strict return precautions given

## 2025-07-27 NOTE — ED ADULT NURSE NOTE - NURSING NEURO ORIENTATION
oriented to person, place and time

## 2025-07-27 NOTE — ED PROVIDER NOTE - PATIENT PORTAL LINK FT
You can access the FollowMyHealth Patient Portal offered by [facility] by registering at the following website: [email or web address]. By joining [employer]’s FollowMyHealth portal, you will also be able to view your health information using other applications (apps) compatible with our system.

## 2025-07-27 NOTE — ED ADULT TRIAGE NOTE - CHIEF COMPLAINT QUOTE
Pt c/o R foot pain and generalized body discomfort "I am withdrawing from heroin". Pt endorses "snorting" heroin on Friday. Seen at [place] yesterday for same complaint. Denies cp, sob.

## 2025-07-29 DIAGNOSIS — R52 PAIN, UNSPECIFIED: ICD-10-CM

## 2025-07-29 DIAGNOSIS — F19.10 OTHER PSYCHOACTIVE SUBSTANCE ABUSE, UNCOMPLICATED: ICD-10-CM

## 2025-08-19 ENCOUNTER — EMERGENCY (EMERGENCY)
Facility: HOSPITAL | Age: 70
LOS: 1 days | End: 2025-08-19
Admitting: STUDENT IN AN ORGANIZED HEALTH CARE EDUCATION/TRAINING PROGRAM
Payer: SELF-PAY

## 2025-08-19 VITALS — HEART RATE: 60 BPM | DIASTOLIC BLOOD PRESSURE: 60 MMHG | SYSTOLIC BLOOD PRESSURE: 105 MMHG

## 2025-08-19 VITALS
WEIGHT: 154.98 LBS | HEIGHT: 71 IN | HEART RATE: 97 BPM | OXYGEN SATURATION: 98 % | DIASTOLIC BLOOD PRESSURE: 71 MMHG | RESPIRATION RATE: 18 BRPM | SYSTOLIC BLOOD PRESSURE: 111 MMHG | TEMPERATURE: 98 F

## 2025-08-19 DIAGNOSIS — Z87.898 PERSONAL HISTORY OF OTHER SPECIFIED CONDITIONS: Chronic | ICD-10-CM

## 2025-08-19 PROCEDURE — 73630 X-RAY EXAM OF FOOT: CPT

## 2025-08-19 PROCEDURE — 99284 EMERGENCY DEPT VISIT MOD MDM: CPT

## 2025-08-19 PROCEDURE — 99283 EMERGENCY DEPT VISIT LOW MDM: CPT | Mod: 25

## 2025-08-19 PROCEDURE — 73630 X-RAY EXAM OF FOOT: CPT | Mod: 26,RT

## 2025-08-19 RX ORDER — ACETAMINOPHEN 500 MG/5ML
650 LIQUID (ML) ORAL ONCE
Refills: 0 | Status: COMPLETED | OUTPATIENT
Start: 2025-08-19 | End: 2025-08-19

## 2025-08-19 RX ADMIN — Medication 650 MILLIGRAM(S): at 20:17

## 2025-08-22 DIAGNOSIS — Z59.00 HOMELESSNESS UNSPECIFIED: ICD-10-CM

## 2025-08-22 DIAGNOSIS — M79.674 PAIN IN RIGHT TOE(S): ICD-10-CM

## 2025-08-22 SDOH — ECONOMIC STABILITY - HOUSING INSECURITY: HOMELESSNESS UNSPECIFIED: Z59.00

## 2025-09-07 ENCOUNTER — EMERGENCY (EMERGENCY)
Facility: HOSPITAL | Age: 70
LOS: 1 days | End: 2025-09-07
Admitting: STUDENT IN AN ORGANIZED HEALTH CARE EDUCATION/TRAINING PROGRAM
Payer: SELF-PAY

## 2025-09-07 VITALS
OXYGEN SATURATION: 100 % | TEMPERATURE: 98 F | SYSTOLIC BLOOD PRESSURE: 119 MMHG | RESPIRATION RATE: 17 BRPM | DIASTOLIC BLOOD PRESSURE: 78 MMHG | HEART RATE: 67 BPM

## 2025-09-07 VITALS
OXYGEN SATURATION: 100 % | DIASTOLIC BLOOD PRESSURE: 78 MMHG | TEMPERATURE: 98 F | RESPIRATION RATE: 17 BRPM | SYSTOLIC BLOOD PRESSURE: 151 MMHG | HEART RATE: 80 BPM | HEIGHT: 71 IN

## 2025-09-07 DIAGNOSIS — Z87.898 PERSONAL HISTORY OF OTHER SPECIFIED CONDITIONS: Chronic | ICD-10-CM

## 2025-09-07 PROCEDURE — 99282 EMERGENCY DEPT VISIT SF MDM: CPT

## 2025-09-07 PROCEDURE — 99283 EMERGENCY DEPT VISIT LOW MDM: CPT

## 2025-09-07 RX ORDER — ACETAMINOPHEN 500 MG/5ML
650 LIQUID (ML) ORAL ONCE
Refills: 0 | Status: COMPLETED | OUTPATIENT
Start: 2025-09-07 | End: 2025-09-07

## 2025-09-07 RX ADMIN — Medication 650 MILLIGRAM(S): at 21:12

## 2025-09-10 DIAGNOSIS — Z59.00 HOMELESSNESS UNSPECIFIED: ICD-10-CM

## 2025-09-10 DIAGNOSIS — M79.604 PAIN IN RIGHT LEG: ICD-10-CM

## 2025-09-10 DIAGNOSIS — M79.605 PAIN IN LEFT LEG: ICD-10-CM

## 2025-09-10 DIAGNOSIS — M79.672 PAIN IN LEFT FOOT: ICD-10-CM

## 2025-09-10 DIAGNOSIS — M79.671 PAIN IN RIGHT FOOT: ICD-10-CM

## 2025-09-10 DIAGNOSIS — R51.9 HEADACHE, UNSPECIFIED: ICD-10-CM

## 2025-09-10 SDOH — ECONOMIC STABILITY - HOUSING INSECURITY: HOMELESSNESS UNSPECIFIED: Z59.00
